# Patient Record
Sex: FEMALE | Race: WHITE | Employment: FULL TIME | URBAN - METROPOLITAN AREA
[De-identification: names, ages, dates, MRNs, and addresses within clinical notes are randomized per-mention and may not be internally consistent; named-entity substitution may affect disease eponyms.]

---

## 2017-04-07 ENCOUNTER — OFFICE VISIT (OUTPATIENT)
Dept: INTERNAL MEDICINE CLINIC | Facility: CLINIC | Age: 42
End: 2017-04-07

## 2017-04-07 ENCOUNTER — LAB ENCOUNTER (OUTPATIENT)
Dept: LAB | Age: 42
End: 2017-04-07
Attending: INTERNAL MEDICINE
Payer: COMMERCIAL

## 2017-04-07 ENCOUNTER — TELEPHONE (OUTPATIENT)
Dept: INTERNAL MEDICINE CLINIC | Facility: CLINIC | Age: 42
End: 2017-04-07

## 2017-04-07 VITALS
SYSTOLIC BLOOD PRESSURE: 100 MMHG | TEMPERATURE: 98 F | HEIGHT: 62 IN | DIASTOLIC BLOOD PRESSURE: 70 MMHG | BODY MASS INDEX: 38.64 KG/M2 | WEIGHT: 210 LBS | RESPIRATION RATE: 12 BRPM | HEART RATE: 76 BPM

## 2017-04-07 DIAGNOSIS — G40.909 SEIZURE DISORDER (HCC): Chronic | ICD-10-CM

## 2017-04-07 DIAGNOSIS — N92.6 MENSTRUAL IRREGULARITY: Primary | ICD-10-CM

## 2017-04-07 DIAGNOSIS — E04.9 ENLARGED THYROID: ICD-10-CM

## 2017-04-07 DIAGNOSIS — I45.81 LONG QT SYNDROME: Chronic | ICD-10-CM

## 2017-04-07 DIAGNOSIS — N80.9 ENDOMETRIOSIS: Primary | ICD-10-CM

## 2017-04-07 DIAGNOSIS — N80.9 ENDOMETRIOSIS: ICD-10-CM

## 2017-04-07 DIAGNOSIS — J45.20 MILD INTERMITTENT ASTHMA WITHOUT COMPLICATION: ICD-10-CM

## 2017-04-07 DIAGNOSIS — R10.2 PELVIC PAIN: ICD-10-CM

## 2017-04-07 DIAGNOSIS — N92.6 MENSTRUAL IRREGULARITY: ICD-10-CM

## 2017-04-07 PROCEDURE — 84702 CHORIONIC GONADOTROPIN TEST: CPT

## 2017-04-07 PROCEDURE — 80185 ASSAY OF PHENYTOIN TOTAL: CPT

## 2017-04-07 PROCEDURE — 84443 ASSAY THYROID STIM HORMONE: CPT

## 2017-04-07 PROCEDURE — 99204 OFFICE O/P NEW MOD 45 MIN: CPT | Performed by: INTERNAL MEDICINE

## 2017-04-07 PROCEDURE — 80053 COMPREHEN METABOLIC PANEL: CPT

## 2017-04-07 PROCEDURE — 85025 COMPLETE CBC W/AUTO DIFF WBC: CPT

## 2017-04-07 RX ORDER — MONTELUKAST SODIUM 10 MG/1
10 TABLET ORAL
Qty: 30 TABLET | Refills: 3 | Status: SHIPPED | OUTPATIENT
Start: 2017-04-07 | End: 2017-08-04

## 2017-04-07 RX ORDER — FLUTICASONE PROPIONATE AND SALMETEROL 500; 50 UG/1; UG/1
1 POWDER RESPIRATORY (INHALATION) 2 TIMES DAILY
Qty: 1 EACH | Refills: 3 | Status: SHIPPED | OUTPATIENT
Start: 2017-04-07 | End: 2018-01-22

## 2017-04-07 NOTE — TELEPHONE ENCOUNTER
Pt had labs drawn today. Please add beta hcg to the blood draw. Order just placed. Please let lab know.

## 2017-04-07 NOTE — TELEPHONE ENCOUNTER
Spoke to [place] at [facility] lab. Confirmed order for Hcg Urine was received. Test will be completed.

## 2017-04-07 NOTE — PROGRESS NOTES
[facility]    CHIEF COMPLAINT:  Patient presents with:  Bleeding: vaginal bleeding since yesterday morning, dark red. Today bleeding continues with abdominal pain.          HISTORY OF PRESENT ILLNESS:  The patient is a 35year old year old female symptoms   • Hearing impaired    • Osteoarthritis    • Asthma         Past Surgical History:       Past Surgical History    PACEMAKER      ORAL SURGERY PROCEDURE      Comment wisdom teeth    REMOVAL OF OVARIAN CYST(S)  2002    TUBAL LIGATION  2002    OTHER [DISCONTINUED] Montelukast Sodium 10 MG Oral Tab Take 1 tablet (10 mg total) by mouth once daily.  Disp: 300 tablet Rfl: 0   [DISCONTINUED] Phenytoin Sodium Extended (DILANTIN) 100 MG Oral Cap 1 tab po tid Disp: 90 capsule Rfl: 0   Cholecalciferol (VITAMI Wes Ross Father      polio wiht  back issues resulting   • Other[Other] [OTHER] Mother      hemochromatosis/ cva liver disorder   • Other[Other] [OTHER] Paternal Grandmother      ephzema       REVIEW OF SYSTEMS:  GENERAL HEALTH: feels well othe times three,cranial nerves are intact,motor and sensory are grossly intact    Labs:     Lab Results  Component Value Date/Time   WBC 6.5 02/02/2016 07:21 PM   HGB 13.2 02/02/2016 07:21 PM   .0 02/02/2016 07:21 PM        Lab Results  Component Value Future    6. Enlarged thyroid  - TSH [E]; Future  - US THYROID (SNZ=[id number]);  Future    RTC 6 months for physical.      Radha Biggs

## 2017-06-12 ENCOUNTER — OFFICE VISIT (OUTPATIENT)
Dept: INTERNAL MEDICINE CLINIC | Facility: CLINIC | Age: 42
End: 2017-06-12

## 2017-06-12 VITALS
BODY MASS INDEX: 38 KG/M2 | WEIGHT: 207 LBS | HEART RATE: 84 BPM | SYSTOLIC BLOOD PRESSURE: 96 MMHG | DIASTOLIC BLOOD PRESSURE: 64 MMHG | TEMPERATURE: 99 F | RESPIRATION RATE: 12 BRPM

## 2017-06-12 DIAGNOSIS — K59.00 CONSTIPATION, UNSPECIFIED CONSTIPATION TYPE: Primary | ICD-10-CM

## 2017-06-12 DIAGNOSIS — R10.9 ABDOMINAL PAIN, UNSPECIFIED LOCATION: ICD-10-CM

## 2017-06-12 PROCEDURE — 99213 OFFICE O/P EST LOW 20 MIN: CPT | Performed by: PHYSICIAN ASSISTANT

## 2017-06-12 NOTE — PROGRESS NOTES
Tina Valdez is a 35year old female  Patient presents with:  Constipation: Translater: Arabella  Anal Problem (GI): pain      HPI:   Pt has had constipation since Saturday, here today with   - has had a lot of abdominal pain and Injection Device Inject 0.3 mg as directed once.  Disp: 1 Device Rfl: 0      Patient Active Problem List:     Long QT syndrome     Seizure disorder (HCC)     Asthma     S/P tubal ligation     Seasonal allergic rhinitis, unspecified allergic rhinitis trigger placed in this encounter. Meds & Refills for this Visit:  No prescriptions requested or ordered in this encounter    Imaging & Consults:  None    No Follow-up on file. There are no Patient Instructions on file for this visit.        The patient ind

## 2017-06-22 ENCOUNTER — PRIOR ORIGINAL RECORDS (OUTPATIENT)
Dept: OTHER | Age: 42
End: 2017-06-22

## 2017-07-31 ENCOUNTER — PRIOR ORIGINAL RECORDS (OUTPATIENT)
Dept: OTHER | Age: 42
End: 2017-07-31

## 2017-08-04 ENCOUNTER — PRIOR ORIGINAL RECORDS (OUTPATIENT)
Dept: OTHER | Age: 42
End: 2017-08-04

## 2017-08-04 DIAGNOSIS — J45.20 MILD INTERMITTENT ASTHMA WITHOUT COMPLICATION: ICD-10-CM

## 2017-08-04 RX ORDER — METOPROLOL SUCCINATE 50 MG/1
50 TABLET, EXTENDED RELEASE ORAL DAILY
Qty: 90 TABLET | Refills: 0 | Status: SHIPPED | OUTPATIENT
Start: 2017-08-04 | End: 2017-12-05

## 2017-08-04 RX ORDER — PHENYTOIN SODIUM 100 MG/1
CAPSULE, EXTENDED RELEASE ORAL
Qty: 120 CAPSULE | Refills: 2 | Status: CANCELLED | OUTPATIENT
Start: 2017-08-04

## 2017-08-04 RX ORDER — MONTELUKAST SODIUM 10 MG/1
10 TABLET ORAL
Qty: 30 TABLET | Refills: 2 | Status: SHIPPED | OUTPATIENT
Start: 2017-08-04 | End: 2017-12-12

## 2017-08-04 RX ORDER — PHENYTOIN SODIUM 100 MG/1
CAPSULE, EXTENDED RELEASE ORAL
Qty: 120 CAPSULE | Refills: 0 | Status: CANCELLED | OUTPATIENT
Start: 2017-08-04

## 2017-08-04 RX ORDER — METOPROLOL SUCCINATE 200 MG/1
200 TABLET, EXTENDED RELEASE ORAL DAILY
Qty: 90 TABLET | Refills: 0 | Status: SHIPPED | OUTPATIENT
Start: 2017-08-04 | End: 2017-12-12

## 2017-08-04 NOTE — TELEPHONE ENCOUNTER
From: Megan Guzman  Sent: 8/4/2017 10:22 AM CDT  Subject: Medication Renewal Request    Daquan Che.  Wendy Mcgraw would like a refill of the following medications:  Montelukast Sodium 10 MG Oral Tab Damaso Aden MD]    Preferred pharmacy: [address] [place] [address]

## 2017-08-04 NOTE — TELEPHONE ENCOUNTER
Last OV pertinent to medication: 4/7/17  Last refill date: 3/29/17     #/refills: 120/0  When pt was asked to return for OV: 6 months  Upcoming appt/reason: none, due 10/2017 latrell stevens

## 2017-08-04 NOTE — TELEPHONE ENCOUNTER
It looks like pt is now pt of Dr. Nery Caceres. Also it looks like she has been suspended from [facility] for bad debt.

## 2017-08-06 NOTE — TELEPHONE ENCOUNTER
Routed to new pcp office to contact pt regarding Dr. Oumou James note below  Dr. Renuka Deras is no longer pcp and is not cardiologist

## 2017-08-07 ENCOUNTER — PRIOR ORIGINAL RECORDS (OUTPATIENT)
Dept: OTHER | Age: 42
End: 2017-08-07

## 2017-08-14 ENCOUNTER — PRIOR ORIGINAL RECORDS (OUTPATIENT)
Dept: OTHER | Age: 42
End: 2017-08-14

## 2017-09-01 ENCOUNTER — PRIOR ORIGINAL RECORDS (OUTPATIENT)
Dept: OTHER | Age: 42
End: 2017-09-01

## 2017-09-05 ENCOUNTER — TELEPHONE (OUTPATIENT)
Dept: INTERNAL MEDICINE CLINIC | Facility: CLINIC | Age: 42
End: 2017-09-05

## 2017-09-20 ENCOUNTER — PRIOR ORIGINAL RECORDS (OUTPATIENT)
Dept: OTHER | Age: 42
End: 2017-09-20

## 2017-09-20 ENCOUNTER — MYAURORA ACCOUNT LINK (OUTPATIENT)
Dept: OTHER | Age: 42
End: 2017-09-20

## 2017-09-21 ENCOUNTER — PRIOR ORIGINAL RECORDS (OUTPATIENT)
Dept: OTHER | Age: 42
End: 2017-09-21

## 2017-10-02 ENCOUNTER — PATIENT MESSAGE (OUTPATIENT)
Dept: INTERNAL MEDICINE CLINIC | Facility: CLINIC | Age: 42
End: 2017-10-02

## 2017-10-02 RX ORDER — ALBUTEROL SULFATE 90 UG/1
AEROSOL, METERED RESPIRATORY (INHALATION)
Qty: 1 INHALER | Refills: 0 | Status: SHIPPED | OUTPATIENT
Start: 2017-10-02 | End: 2018-03-15

## 2017-10-02 NOTE — TELEPHONE ENCOUNTER
Previously prescribed by someone else. Last OV pertinent to medication: 4/7/17  Last refill date: 3/9/12? #/refills: 1+6  When pt was asked to return for OV: 6 months for physical (due now)  Upcoming appt/reason: none, pt notified needs appt.

## 2017-10-02 NOTE — TELEPHONE ENCOUNTER
From: Gabbie Cantor  To: Asif Hernandez MD  Sent: 10/2/2017 8:30 AM CDT  Subject: Prescription Question    HI Dr. Michelle Werner,     I requested Albuterol to be refilled and it was denied initially because it was under someone else.  Can you please place an order

## 2017-10-10 ENCOUNTER — TELEPHONE (OUTPATIENT)
Dept: NEUROLOGY | Facility: CLINIC | Age: 42
End: 2017-10-10

## 2017-11-27 ENCOUNTER — OFFICE VISIT (OUTPATIENT)
Dept: INTERNAL MEDICINE CLINIC | Facility: CLINIC | Age: 42
End: 2017-11-27

## 2017-11-27 ENCOUNTER — HOSPITAL (OUTPATIENT)
Dept: OTHER | Age: 42
End: 2017-11-27
Attending: EMERGENCY MEDICINE

## 2017-11-27 VITALS
HEART RATE: 80 BPM | HEIGHT: 62 IN | TEMPERATURE: 98 F | WEIGHT: 204.13 LBS | RESPIRATION RATE: 12 BRPM | DIASTOLIC BLOOD PRESSURE: 70 MMHG | BODY MASS INDEX: 37.56 KG/M2 | SYSTOLIC BLOOD PRESSURE: 100 MMHG

## 2017-11-27 DIAGNOSIS — R56.9 SEIZURE (HCC): ICD-10-CM

## 2017-11-27 DIAGNOSIS — Z12.31 SCREENING MAMMOGRAM, ENCOUNTER FOR: ICD-10-CM

## 2017-11-27 DIAGNOSIS — R60.0 LEG EDEMA: ICD-10-CM

## 2017-11-27 DIAGNOSIS — N92.6 MENSTRUAL IRREGULARITY: ICD-10-CM

## 2017-11-27 LAB
ANALYZER ANC (IANC): NORMAL
ANION GAP SERPL CALC-SCNC: 12 MMOL/L (ref 10–20)
BASOPHILS # BLD: 0 THOUSAND/MCL (ref 0–0.3)
BASOPHILS NFR BLD: 0 %
BUN SERPL-MCNC: 7 MG/DL (ref 6–20)
BUN/CREAT SERPL: 11 (ref 7–25)
CALCIUM SERPL-MCNC: 8.2 MG/DL (ref 8.4–10.2)
CHLORIDE: 103 MMOL/L (ref 98–107)
CO2 SERPL-SCNC: 26 MMOL/L (ref 21–32)
CREAT SERPL-MCNC: 0.63 MG/DL (ref 0.51–0.95)
DIFFERENTIAL METHOD BLD: NORMAL
EOSINOPHIL # BLD: 0.4 THOUSAND/MCL (ref 0.1–0.5)
EOSINOPHIL NFR BLD: 5 %
ERYTHROCYTE [DISTWIDTH] IN BLOOD: 13.2 % (ref 11–15)
GLUCOSE SERPL-MCNC: 103 MG/DL (ref 65–99)
HEMATOCRIT: 40.2 % (ref 36–46.5)
HGB BLD-MCNC: 13.4 GM/DL (ref 12–15.5)
LYMPHOCYTES # BLD: 1.4 THOUSAND/MCL (ref 1–4.8)
LYMPHOCYTES NFR BLD: 19 %
MCH RBC QN AUTO: 31.6 PG (ref 26–34)
MCHC RBC AUTO-ENTMCNC: 33.3 GM/DL (ref 32–36.5)
MCV RBC AUTO: 94.8 FL (ref 78–100)
MONOCYTES # BLD: 0.9 THOUSAND/MCL (ref 0.3–0.9)
MONOCYTES NFR BLD: 12 %
NEUTROPHILS # BLD: 4.9 THOUSAND/MCL (ref 1.8–7.7)
NEUTROPHILS NFR BLD: 64 %
NEUTS SEG NFR BLD: NORMAL %
PERCENT NRBC: NORMAL
PHENYTOIN SERPL-MCNC: 5.9 MCG/ML (ref 10–20)
PLATELET # BLD: 205 THOUSAND/MCL (ref 140–450)
POTASSIUM SERPL-SCNC: 3.2 MMOL/L (ref 3.4–5.1)
RBC # BLD: 4.24 MILLION/MCL (ref 4–5.2)
SODIUM SERPL-SCNC: 138 MMOL/L (ref 135–145)
WBC # BLD: 7.7 THOUSAND/MCL (ref 4.2–11)

## 2017-11-27 PROCEDURE — 99214 OFFICE O/P EST MOD 30 MIN: CPT | Performed by: INTERNAL MEDICINE

## 2017-11-27 RX ORDER — PHENYTOIN SODIUM 100 MG/1
400 CAPSULE, EXTENDED RELEASE ORAL DAILY
COMMUNITY
End: 2018-07-19

## 2017-11-27 NOTE — PROGRESS NOTES
[facility]    CHIEF COMPLAINT:  Patient presents with:  ER F/U: pt was seen today at [facility] ER regarding a seizure  Medication Problem: pt not clear how she should be taking dilantin.           HISTORY OF PRESENT ILLNESS:  Pt here with multiple MG Oral Tablet 24 Hr Take 1 tablet (200 mg total) by mouth daily. Take with 50mg tablet for 250 mg dose daily Disp: 90 tablet Rfl: 0   Metoprolol Succinate ER 50 MG Oral Tablet 24 Hr Take 1 tablet (50 mg total) by mouth daily.  Take with 200mg tablet daily METABOLIC PANEL (14)   Result Value Ref Range   Glucose 86 70 - 99 mg/dL   BUN 12 8 - 20 mg/dL   Creatinine 0.61 0.55 - 1.02 mg/dL    >=60   Calcium, Total 8.6 8.3 - 10.3 mg/dL   Alkaline Phosphatase 91 37 - 98 U/L   AST 22 15 - 41 U/L   Alt 32 14 - perimenopausal.   Will check pelvic us and labs. Needs gyne follow up, given names today. - US PELVIS EV (TRNS ABD AND ENDOVAG) (VJO=[id number]); Future  - LH (LUTEINIZING HORMONE); Future  - FSH; Future    4.  Screening mammogram, encounter for  - INDERJIT

## 2017-11-29 ENCOUNTER — TELEPHONE (OUTPATIENT)
Dept: INTERNAL MEDICINE CLINIC | Facility: CLINIC | Age: 42
End: 2017-11-29

## 2017-11-29 NOTE — TELEPHONE ENCOUNTER
Okay to go back to work but cannot drive or operate heavy machinery until she is cleared by neurology. Also please check to make sure she does not do any heavy lifting or climbing on ladders at work. Please do letter once confirmed.

## 2017-11-29 NOTE — TELEPHONE ENCOUNTER
Patient called for a status on her back to work letter. She said did not get the latest [employer] message. 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 Please send a [employer] message when letter is ready.

## 2017-11-29 NOTE — TELEPHONE ENCOUNTER
Pt requesting return to work note, indicating that pt is ready to return to work 11/30/17. Pt requesting to  note later today. Letter pended. Please advise.

## 2017-11-29 NOTE — TELEPHONE ENCOUNTER
Pt needs a note that she can return to work. She would like to go back to work by tomorrow. Pt would like to  note today. Please call her when it is ready.  Thank you

## 2017-11-29 NOTE — TELEPHONE ENCOUNTER
Noted below. Sent [employer] message advising pt that letter ready for  at .

## 2017-11-29 NOTE — TELEPHONE ENCOUNTER
Left detailed message on pt's voice mail re: note for return to work will include restrictions about avoiding heavy lifting and climbing ladders at work. Pt to call back to confirm that her work does not involve this activity.     Letter edited to include

## 2017-11-30 ENCOUNTER — APPOINTMENT (OUTPATIENT)
Dept: LAB | Age: 42
End: 2017-11-30
Attending: INTERNAL MEDICINE
Payer: COMMERCIAL

## 2017-11-30 DIAGNOSIS — N92.6 MENSTRUAL IRREGULARITY: ICD-10-CM

## 2017-11-30 PROCEDURE — 36415 COLL VENOUS BLD VENIPUNCTURE: CPT

## 2017-11-30 PROCEDURE — 83001 ASSAY OF GONADOTROPIN (FSH): CPT

## 2017-11-30 PROCEDURE — 83002 ASSAY OF GONADOTROPIN (LH): CPT

## 2017-12-05 ENCOUNTER — OFFICE VISIT (OUTPATIENT)
Dept: NEUROLOGY | Facility: CLINIC | Age: 42
End: 2017-12-05

## 2017-12-05 VITALS
RESPIRATION RATE: 16 BRPM | HEIGHT: 62 IN | BODY MASS INDEX: 37.73 KG/M2 | DIASTOLIC BLOOD PRESSURE: 63 MMHG | SYSTOLIC BLOOD PRESSURE: 111 MMHG | HEART RATE: 82 BPM | WEIGHT: 205 LBS

## 2017-12-05 DIAGNOSIS — G40.909 SEIZURE DISORDER (HCC): Primary | Chronic | ICD-10-CM

## 2017-12-05 DIAGNOSIS — I45.81 LONG QT SYNDROME: Chronic | ICD-10-CM

## 2017-12-05 PROCEDURE — 99204 OFFICE O/P NEW MOD 45 MIN: CPT | Performed by: OTHER

## 2017-12-05 RX ORDER — METOPROLOL SUCCINATE 50 MG/1
TABLET, EXTENDED RELEASE ORAL
Qty: 90 TABLET | Refills: 0 | Status: SHIPPED | OUTPATIENT
Start: 2017-12-05 | End: 2017-12-12

## 2017-12-05 RX ORDER — PHENYTOIN 50 MG/1
50 TABLET, CHEWABLE ORAL DAILY
Qty: 30 TABLET | Refills: 5 | Status: SHIPPED | OUTPATIENT
Start: 2017-12-05 | End: 2018-02-26

## 2017-12-05 RX ORDER — PHENYTOIN SODIUM 100 MG/1
400 CAPSULE, EXTENDED RELEASE ORAL DAILY
Qty: 120 CAPSULE | Refills: 5 | Status: SHIPPED | OUTPATIENT
Start: 2017-12-05 | End: 2018-01-22

## 2017-12-05 NOTE — PROGRESS NOTES
[facility]   Neurology; INITIAL CLINIC VISIT  2017, 10:03 AM     Tory Peres Patient Status:  No patient class for patient encounter    1975 MRN [id number]   Location [facility] Attending No att.  prov HISTORY      Comment: knee left scope  not acl  09/15/09: OTHER SURGICAL HISTORY      Comment: colpo   No date: PACEMAKER  2002: REMOVAL OF OVARIAN CYST(S)  2002: TUBAL LIGATION    FAMILY HISTORY:  family history includes Allergies in her mother; Arthritis tablet, Rfl: 0  •  fluticasone-salmeterol (ADVAIR DISKUS) 500-50 MCG/DOSE Inhalation Aerosol Powder, Breath Activated, Inhale 1 puff into the lungs 2 (two) times daily. , Disp: 1 each, Rfl: 3  •  Ciclopirox Olamine (LOPROX) 0.77 % Apply Externally Cream, Ap again.    PLAN:  Diagnostic:    Repeat levels of Dilantin in 10 days. Referrals: None    Treatments: Continue Dilantin but at the higher dose of 450 mg daily.     Others: Advised accordingly and we will decide about driving in 10 days depending on her bl

## 2017-12-05 NOTE — PATIENT INSTRUCTIONS
Refill policies:    • Allow 2-3 business days for refills; controlled substances may take longer.   • Contact your pharmacy at least 5 days prior to running out of medication and have them send an electronic request or submit request through the [facility] have a procedure or additional testing performed. [facility]) will contact your insurance carrier to obtain pre-certification or prior authorization.     Unfortunately, [facility]I has seen an increase in denial of payment even though the p

## 2017-12-11 RX ORDER — METOPROLOL SUCCINATE 200 MG/1
200 TABLET, EXTENDED RELEASE ORAL DAILY
Qty: 90 TABLET | Refills: 0 | Status: CANCELLED
Start: 2017-12-11

## 2017-12-11 RX ORDER — METOPROLOL SUCCINATE 50 MG/1
TABLET, EXTENDED RELEASE ORAL
Qty: 90 TABLET | Refills: 0 | Status: CANCELLED
Start: 2017-12-11

## 2017-12-11 NOTE — TELEPHONE ENCOUNTER
Pt advised to contact us approx 1 week before actually needing the refill. Otherwise re-send request if she would like the pharmacy to put a script \"on hold\" but would have to advise them when she is ready for it that she has on \"on hold. \" for now, req

## 2017-12-11 NOTE — TELEPHONE ENCOUNTER
From: Alana De Souza  Sent: 12/11/2017 1:12 PM CST  Subject: Medication Renewal Request    Elenora Skiff.  Sharita Adams would like a refill of the following medications:     Metoprolol Succinate  MG Oral Tablet 24 Hr Nolan Boswell MD]   Patient Comment: I h

## 2017-12-12 DIAGNOSIS — J45.20 MILD INTERMITTENT ASTHMA WITHOUT COMPLICATION: ICD-10-CM

## 2017-12-12 RX ORDER — MONTELUKAST SODIUM 10 MG/1
10 TABLET ORAL
Qty: 30 TABLET | Refills: 1 | Status: SHIPPED
Start: 2017-12-12 | End: 2018-04-14

## 2017-12-12 RX ORDER — METOPROLOL SUCCINATE 200 MG/1
200 TABLET, EXTENDED RELEASE ORAL DAILY
Qty: 90 TABLET | Refills: 0 | Status: SHIPPED | OUTPATIENT
Start: 2017-12-12 | End: 2018-04-14

## 2017-12-12 RX ORDER — METOPROLOL SUCCINATE 50 MG/1
TABLET, EXTENDED RELEASE ORAL
Qty: 90 TABLET | Refills: 0 | Status: SHIPPED | OUTPATIENT
Start: 2017-12-12 | End: 2018-04-18

## 2017-12-12 RX ORDER — MONTELUKAST SODIUM 10 MG/1
10 TABLET ORAL
Qty: 30 TABLET | Refills: 2 | OUTPATIENT
Start: 2017-12-12

## 2017-12-12 NOTE — TELEPHONE ENCOUNTER
Was patient advised to contact pharmacy directly?:  Patient did not want to call it in.     Is patient out of meds or supply very low?:  Totally Out    Medication Requested:  Montelukast Sodium 10 MG Oral Tab    Is patient requesting a 30 or 90 day supply?:

## 2017-12-12 NOTE — TELEPHONE ENCOUNTER
Pt called with translater, did not read [employer] message. Wanted to know why rx denied. Explained again. Pt prefers rx be sent to pharmacy now and it end up on hold than risk it not getting filled on time. Will send refill request for pt.

## 2017-12-12 NOTE — TELEPHONE ENCOUNTER
Passes protocol but last note said to return for CPX in 2 weeks. Pt scheduled in January. Okay to fill? Metoprolol Succinate  MG Oral Tablet 24 Hr  Take 1 tablet (200 mg total) by mouth daily.  Take with 50mg tablet for 250 mg dose daily       Marivel Vazquez

## 2017-12-12 NOTE — TELEPHONE ENCOUNTER
From: Kathi Franklin  Sent: 12/12/2017 2:22 PM CST  Subject: Medication Renewal Request    Rob Wang.  Curtis Cherry would like a refill of the following medications:     Montelukast Sodium 10 MG Oral Tab Jade Sparks MD]   Patient Comment: Please request Angelina Haywood

## 2017-12-18 ENCOUNTER — TELEPHONE (OUTPATIENT)
Dept: NEUROLOGY | Facility: CLINIC | Age: 42
End: 2017-12-18

## 2017-12-18 ENCOUNTER — APPOINTMENT (OUTPATIENT)
Dept: LAB | Age: 42
End: 2017-12-18
Attending: INTERNAL MEDICINE
Payer: COMMERCIAL

## 2017-12-18 DIAGNOSIS — G40.909 SEIZURE DISORDER (HCC): Chronic | ICD-10-CM

## 2017-12-18 DIAGNOSIS — I45.81 LONG QT SYNDROME: Chronic | ICD-10-CM

## 2017-12-18 DIAGNOSIS — R56.9 SEIZURES (HCC): Primary | ICD-10-CM

## 2017-12-18 PROCEDURE — 80185 ASSAY OF PHENYTOIN TOTAL: CPT

## 2017-12-18 PROCEDURE — 36415 COLL VENOUS BLD VENIPUNCTURE: CPT

## 2017-12-18 NOTE — TELEPHONE ENCOUNTER
Per chart review:    Impression:  Seizure disorder ([facility] [address]  (primary encounter diagnosis)  Long QT syndrome     Discussion: I believe overall she is stable however missing 1 dose should not make the level extremely low.   In order to provide some safety facto

## 2017-12-18 NOTE — TELEPHONE ENCOUNTER
I called patient and left a message that the level is 21.5 so we do have some follow-up questions before we can clear her to go back driving. We need to know what time the blood was drawn for the test in relation to her intake of the Dilantin.       We may

## 2017-12-19 NOTE — TELEPHONE ENCOUNTER
Patient returned call. Instructions per Dr. Ochoa Paxinos given to patient through interpretor. My chart message also sent to patient. Order for repeat dilantin level placed in Epic.

## 2017-12-19 NOTE — TELEPHONE ENCOUNTER
Left message for patient regarding timing of Dilantin and blood draw. Requested patient my chart or call office with information.

## 2017-12-26 ENCOUNTER — HOSPITAL ENCOUNTER (OUTPATIENT)
Dept: MAMMOGRAPHY | Facility: HOSPITAL | Age: 42
Discharge: HOME | End: 2017-12-26
Attending: INTERNAL MEDICINE
Payer: COMMERCIAL

## 2017-12-26 ENCOUNTER — HOSPITAL ENCOUNTER (OUTPATIENT)
Dept: ULTRASOUND IMAGING | Facility: HOSPITAL | Age: 42
Discharge: HOME | End: 2017-12-26
Attending: INTERNAL MEDICINE
Payer: COMMERCIAL

## 2017-12-26 DIAGNOSIS — Z12.31 SCREENING MAMMOGRAM, ENCOUNTER FOR: ICD-10-CM

## 2017-12-26 DIAGNOSIS — N92.6 MENSTRUAL IRREGULARITY: ICD-10-CM

## 2017-12-26 PROCEDURE — 77067 SCR MAMMO BI INCL CAD: CPT | Performed by: INTERNAL MEDICINE

## 2017-12-26 PROCEDURE — 76830 TRANSVAGINAL US NON-OB: CPT | Performed by: INTERNAL MEDICINE

## 2017-12-26 PROCEDURE — 76856 US EXAM PELVIC COMPLETE: CPT | Performed by: INTERNAL MEDICINE

## 2018-01-03 ENCOUNTER — APPOINTMENT (OUTPATIENT)
Dept: LAB | Facility: HOSPITAL | Age: 43
End: 2018-01-03
Attending: Other
Payer: COMMERCIAL

## 2018-01-03 DIAGNOSIS — R56.9 SEIZURES (HCC): ICD-10-CM

## 2018-01-03 LAB — PHENYTOIN (DILANTIN): 11.8 UG/ML (ref 10–20)

## 2018-01-03 PROCEDURE — 36415 COLL VENOUS BLD VENIPUNCTURE: CPT

## 2018-01-03 PROCEDURE — 80185 ASSAY OF PHENYTOIN TOTAL: CPT

## 2018-01-11 ENCOUNTER — HOSPITAL ENCOUNTER (OUTPATIENT)
Dept: MAMMOGRAPHY | Facility: HOSPITAL | Age: 43
Discharge: HOME | End: 2018-01-11
Attending: INTERNAL MEDICINE
Payer: COMMERCIAL

## 2018-01-11 DIAGNOSIS — R92.2 INCONCLUSIVE MAMMOGRAM: ICD-10-CM

## 2018-01-22 ENCOUNTER — OFFICE VISIT (OUTPATIENT)
Dept: INTERNAL MEDICINE CLINIC | Facility: CLINIC | Age: 43
End: 2018-01-22

## 2018-01-22 VITALS
SYSTOLIC BLOOD PRESSURE: 100 MMHG | HEIGHT: 62 IN | RESPIRATION RATE: 12 BRPM | TEMPERATURE: 98 F | BODY MASS INDEX: 37.7 KG/M2 | WEIGHT: 204.88 LBS | DIASTOLIC BLOOD PRESSURE: 70 MMHG | HEART RATE: 76 BPM

## 2018-01-22 DIAGNOSIS — R32 URINARY INCONTINENCE, UNSPECIFIED TYPE: ICD-10-CM

## 2018-01-22 DIAGNOSIS — Z00.00 PHYSICAL EXAM, ANNUAL: ICD-10-CM

## 2018-01-22 DIAGNOSIS — J45.20 MILD INTERMITTENT ASTHMA WITHOUT COMPLICATION: ICD-10-CM

## 2018-01-22 DIAGNOSIS — G40.909 SEIZURE DISORDER (HCC): Chronic | ICD-10-CM

## 2018-01-22 DIAGNOSIS — Z23 NEED FOR VACCINATION: ICD-10-CM

## 2018-01-22 LAB
APPEARANCE: CLEAR
BILIRUBIN: NEGATIVE
GLUCOSE (URINE DIPSTICK): NEGATIVE MG/DL
KETONES (URINE DIPSTICK): NEGATIVE MG/DL
LEUKOCYTES: NEGATIVE
MULTISTIX LOT#: NORMAL NUMERIC
NITRITE, URINE: NEGATIVE
OCCULT BLOOD: NEGATIVE
PH, URINE: 6.5 (ref 4.5–8)
PROTEIN (URINE DIPSTICK): NEGATIVE MG/DL
SPECIFIC GRAVITY: 1.01 (ref 1–1.03)
URINE-COLOR: YELLOW
UROBILINOGEN,SEMI-QN: 0.2 MG/DL (ref 0–1.9)

## 2018-01-22 PROCEDURE — 90471 IMMUNIZATION ADMIN: CPT | Performed by: INTERNAL MEDICINE

## 2018-01-22 PROCEDURE — 99396 PREV VISIT EST AGE 40-64: CPT | Performed by: INTERNAL MEDICINE

## 2018-01-22 PROCEDURE — 81003 URINALYSIS AUTO W/O SCOPE: CPT | Performed by: INTERNAL MEDICINE

## 2018-01-22 PROCEDURE — 90732 PPSV23 VACC 2 YRS+ SUBQ/IM: CPT | Performed by: INTERNAL MEDICINE

## 2018-01-22 RX ORDER — FLUTICASONE PROPIONATE AND SALMETEROL 500; 50 UG/1; UG/1
1 POWDER RESPIRATORY (INHALATION) 2 TIMES DAILY
Qty: 1 EACH | Refills: 3 | Status: SHIPPED | OUTPATIENT
Start: 2018-01-22 | End: 2018-03-15

## 2018-01-22 RX ORDER — EPINEPHRINE 0.3 MG/.3ML
0.3 INJECTION SUBCUTANEOUS ONCE
Qty: 1 EACH | Refills: 0 | Status: SHIPPED | OUTPATIENT
Start: 2018-01-22 | End: 2018-10-09

## 2018-01-22 NOTE — PROGRESS NOTES
[facility]    CHIEF COMPLAINT: Patient presents with:  Routine Physical: 6/24/13 normal pap, neg HPV.  12/26/17 mammogram      Declines flu shot  Urinary: urinary incontience x 4-5 years. When at [facility].   Had noticed lump left lower abdomen, thou 250 mg dose daily Disp: 90 tablet Rfl: 0   Metoprolol Succinate ER 50 MG Oral Tablet 24 Hr TAKE 1 TABLET BY MOUTH DAILY. TAKE WITH 200 MG TABLET FOR TOTAL 250 MG DAILY.  Disp: 90 tablet Rfl: 0   Montelukast Sodium 10 MG Oral Tab Take 1 tablet (10 mg total) CYST(S)  2002: TUBAL LIGATION   Family History   Problem Relation Age of Onset   • Heart Disorder Father    • Other Pamila Mize Father      polio wiht  back issues resulting   • Hypertension Mother    • Asthma Mother    • Allergies Mother      med's  and seans distress  SKIN: no rashes,no suspicious lesions  HEENT: atraumatic, normocephalic,ears and throat are clear  EYES:PERRLA, conjunctiva are clear  NECK: supple,no adenopathy,no bruits  CHEST: no chest tenderness  LUNGS: clear to auscultation  CARDIO: nl s1 a negative. - UROLOGY - INTERNAL    3. Mild intermittent asthma without complication  Continue inhalers. - fluticasone-salmeterol (ADVAIR DISKUS) 500-50 MCG/DOSE Inhalation Aerosol Powder, Breath Activated;  Inhale 1 puff into the lungs 2 (two) times haley

## 2018-01-24 ENCOUNTER — TELEPHONE (OUTPATIENT)
Dept: INTERNAL MEDICINE CLINIC | Facility: CLINIC | Age: 43
End: 2018-01-24

## 2018-01-24 NOTE — TELEPHONE ENCOUNTER
Incoming (mail or fax): fax  Received from:  [facility]  Documentation given to:  triage;

## 2018-01-30 ENCOUNTER — PRIOR ORIGINAL RECORDS (OUTPATIENT)
Dept: OTHER | Age: 43
End: 2018-01-30

## 2018-01-31 ENCOUNTER — TELEPHONE (OUTPATIENT)
Dept: INTERNAL MEDICINE CLINIC | Facility: CLINIC | Age: 43
End: 2018-01-31

## 2018-02-21 ENCOUNTER — HOSPITAL ENCOUNTER (OUTPATIENT)
Age: 43
Discharge: HOME | End: 2018-02-21
Attending: FAMILY MEDICINE
Payer: COMMERCIAL

## 2018-02-21 VITALS
TEMPERATURE: 98 F | OXYGEN SATURATION: 97 % | SYSTOLIC BLOOD PRESSURE: 118 MMHG | DIASTOLIC BLOOD PRESSURE: 73 MMHG | RESPIRATION RATE: 16 BRPM | HEART RATE: 96 BPM

## 2018-02-21 DIAGNOSIS — H00.024 HORDEOLUM INTERNUM OF LEFT UPPER EYELID: Primary | ICD-10-CM

## 2018-02-21 PROCEDURE — 99213 OFFICE O/P EST LOW 20 MIN: CPT

## 2018-02-21 PROCEDURE — 99203 OFFICE O/P NEW LOW 30 MIN: CPT

## 2018-02-21 RX ORDER — TOBRAMYCIN 3 MG/ML
1 SOLUTION/ DROPS OPHTHALMIC EVERY 6 HOURS
Qty: 1 BOTTLE | Refills: 0 | Status: SHIPPED | OUTPATIENT
Start: 2018-02-21 | End: 2018-02-26

## 2018-02-21 NOTE — ED PROVIDER NOTES
Patient Seen in: [address]    History   Patient presents with:   Eye Visual Problem (opthalmic)    Stated Complaint: LEFT EYE BOTHERING HER SINCE YESTERDAY AFTERNOON, NO IMPROVEMENT AFTER COLD COM*    HPI    15-year-old fem equivalent: 0 - 1 per week     Comment: 1 drink every 1 or 2 weeks. Review of Systems    Positive for stated complaint: LEFT EYE BOTHERING HER SINCE YESTERDAY AFTERNOON, NO IMPROVEMENT AFTER COLD COM*  Other systems are as noted in HPI.   Constitutiona medications    Tobramycin Sulfate 0.3 % Ophthalmic Solution  Place 1 drop into the left eye every 6 (six) hours. , Normal, Disp-1 Bottle, R-0

## 2018-02-23 ENCOUNTER — TELEPHONE (OUTPATIENT)
Dept: INTERNAL MEDICINE CLINIC | Facility: CLINIC | Age: 43
End: 2018-02-23

## 2018-02-23 ENCOUNTER — TELEPHONE (OUTPATIENT)
Dept: NEUROLOGY | Facility: CLINIC | Age: 43
End: 2018-02-23

## 2018-02-23 DIAGNOSIS — G40.909 SEIZURE DISORDER (HCC): Primary | Chronic | ICD-10-CM

## 2018-02-23 NOTE — TELEPHONE ENCOUNTER
From: Gloria Lane  Sent: 2/23/2018 12:26 PM CST  To: Prieto Hsu Nurse  Subject: RE:Dilantin level    Thank you very much Nova Larios.      I did not feel comfortable sticking with 400 mg as there were times when it felt as if my heart was trying to

## 2018-02-23 NOTE — TELEPHONE ENCOUNTER
Patient was seen in [address] on Wednesday for a stye. She was given a medication that she thinks might be causing headaches. She wanted to schedule a follow up with Dr Birdia Collet for next week. Neither Dr Birdia Collet or Nancy Meyer have availability next week.   Please

## 2018-02-26 ENCOUNTER — HOSPITAL ENCOUNTER (OUTPATIENT)
Age: 43
Discharge: HOME | End: 2018-02-26
Attending: FAMILY MEDICINE
Payer: COMMERCIAL

## 2018-02-26 VITALS
SYSTOLIC BLOOD PRESSURE: 121 MMHG | DIASTOLIC BLOOD PRESSURE: 86 MMHG | OXYGEN SATURATION: 96 % | WEIGHT: 200 LBS | HEIGHT: 62 IN | BODY MASS INDEX: 36.8 KG/M2 | TEMPERATURE: 97 F | RESPIRATION RATE: 16 BRPM | HEART RATE: 83 BPM

## 2018-02-26 DIAGNOSIS — L03.213 PRESEPTAL CELLULITIS OF LEFT EYE: ICD-10-CM

## 2018-02-26 DIAGNOSIS — H10.502 BLEPHAROCONJUNCTIVITIS OF LEFT EYE, UNSPECIFIED BLEPHAROCONJUNCTIVITIS TYPE: Primary | ICD-10-CM

## 2018-02-26 PROCEDURE — 99213 OFFICE O/P EST LOW 20 MIN: CPT

## 2018-02-26 PROCEDURE — 99214 OFFICE O/P EST MOD 30 MIN: CPT

## 2018-02-26 RX ORDER — PHENYTOIN 50 MG/1
50 TABLET, CHEWABLE ORAL DAILY
Qty: 30 TABLET | Refills: 5 | COMMUNITY
Start: 2018-02-26 | End: 2018-08-15

## 2018-02-26 RX ORDER — OFLOXACIN 3 MG/ML
2 SOLUTION/ DROPS OPHTHALMIC EVERY 4 HOURS
Qty: 1 BOTTLE | Refills: 0 | Status: SHIPPED | OUTPATIENT
Start: 2018-02-26 | End: 2018-03-05

## 2018-02-26 RX ORDER — CLINDAMYCIN HYDROCHLORIDE 300 MG/1
300 CAPSULE ORAL 3 TIMES DAILY
Qty: 30 CAPSULE | Refills: 0 | Status: SHIPPED | OUTPATIENT
Start: 2018-02-26 | End: 2018-03-08

## 2018-02-26 NOTE — TELEPHONE ENCOUNTER
Message sent to patient with new dosage information. Instructed to contact office if new prescription required. Epic updated.

## 2018-02-26 NOTE — ED INITIAL ASSESSMENT (HPI)
Was seen and treated here on 2/21/18 for left eye pain and discharge. Today states she stopped taking her antibiotics on Saturday because she was getting headaches.   States her eye is not getting better and is swollen on the bottom and still having discha

## 2018-02-26 NOTE — TELEPHONE ENCOUNTER
Left message on pt's voice mail re: what symptoms pt is having. Pt asked to call back with condition update.

## 2018-02-27 NOTE — ED PROVIDER NOTES
Patient Seen in: [facility]    History   Patient presents with: Eye Visual Problem (opthalmic)    Stated Complaint: left eye irritation x 1 week    HPI    49-year-old female presents for left eye pain and drainage.   Ester 9/28/2008  Smokeless tobacco: Former User                     Alcohol use: Yes           0.0 - 0.6 oz/week     Standard drinks or equivalent: 0 - 1 per week     Comment: 1 drink every 1 or 2 weeks.       Review of Systems    Positive for stated complaint: l Advised to continue warm compresses to the eye. Start ofloxacin ophthalmic drops and clindamycin as prescribed. Follow with ophthalmologist in 2 days if not better.       Disposition and Plan     Clinical Impression:  Blepharoconjunctivitis of left

## 2018-03-01 NOTE — TELEPHONE ENCOUNTER
Left message on pt's mobile voice mail re: what symptoms pt is having. Pt asked to call back with condition update.

## 2018-03-02 NOTE — TELEPHONE ENCOUNTER
Pt called back via . States was at  twice, eye irritation and swelling. She did not call back when she inititally asked about eye drops possibly causing headache. She went to [facility] for this.  Tobramycin D/C'd and pt started on oxofloxacin which ha

## 2018-03-02 NOTE — TELEPHONE ENCOUNTER
Note sent via [email or web address] asking pt to call office or schedule online for appt next week for follow up.  Pt uses [employer], will check that pt did read msg (has been asked to use [employer] more frequently as it has been hard to reach her by phone in past), if she has n

## 2018-03-02 NOTE — TELEPHONE ENCOUNTER
Note not reviewed on mychart. PSR - please call pt (uses [employer]) to schedule Urgent Care follow up for eye. Dr Geri Terrazas does want her to follow up with her next week. Thanks!

## 2018-03-05 NOTE — TELEPHONE ENCOUNTER
Spoke with patient, advised of no show appointment and advised she will be getting a $25 no show fee. Patient rescheduled to come in Thursday 3/15/18 at 1:00. JOSH

## 2018-03-15 ENCOUNTER — TELEPHONE (OUTPATIENT)
Dept: INTERNAL MEDICINE CLINIC | Facility: CLINIC | Age: 43
End: 2018-03-15

## 2018-03-15 ENCOUNTER — OFFICE VISIT (OUTPATIENT)
Dept: INTERNAL MEDICINE CLINIC | Facility: CLINIC | Age: 43
End: 2018-03-15

## 2018-03-15 VITALS
RESPIRATION RATE: 12 BRPM | WEIGHT: 206.5 LBS | BODY MASS INDEX: 38 KG/M2 | TEMPERATURE: 98 F | HEIGHT: 62 IN | DIASTOLIC BLOOD PRESSURE: 72 MMHG | SYSTOLIC BLOOD PRESSURE: 120 MMHG | HEART RATE: 80 BPM

## 2018-03-15 DIAGNOSIS — J45.30 MILD PERSISTENT ASTHMA WITHOUT COMPLICATION: Primary | Chronic | ICD-10-CM

## 2018-03-15 DIAGNOSIS — G40.909 SEIZURE DISORDER (HCC): Chronic | ICD-10-CM

## 2018-03-15 DIAGNOSIS — L03.213 PRESEPTAL CELLULITIS OF LEFT EYE: ICD-10-CM

## 2018-03-15 PROCEDURE — 99213 OFFICE O/P EST LOW 20 MIN: CPT | Performed by: INTERNAL MEDICINE

## 2018-03-15 RX ORDER — BUDESONIDE AND FORMOTEROL FUMARATE DIHYDRATE 160; 4.5 UG/1; UG/1
1 AEROSOL RESPIRATORY (INHALATION) 2 TIMES DAILY
Qty: 3 INHALER | Refills: 0 | Status: SHIPPED | OUTPATIENT
Start: 2018-03-15 | End: 2018-10-09

## 2018-03-15 RX ORDER — ALBUTEROL SULFATE 90 UG/1
AEROSOL, METERED RESPIRATORY (INHALATION)
Qty: 1 INHALER | Refills: 0 | Status: SHIPPED | OUTPATIENT
Start: 2018-03-15 | End: 2018-05-17

## 2018-03-15 NOTE — PROGRESS NOTES
[facility]    CHIEF COMPLAINT:  Patient presents with:  Urgent Care F/u  Asthma: ACT- 17               REquesting a less expensive inhaler option to Advair. HISTORY OF PRESENT ILLNESS:  Here for follow up.    Was in urgent care last month one time.  Disp: 1 each Rfl: 0       PAST MEDICAL, SOCIAL, AND FAMILY HISTORY:  Tobacco:    Smoking status: Former Smoker 0.80 Packs/day For 2.00 Years   Types: Cigarettes   Quit date: 9/28/2008   Smokeless tobacco: Former User       PHYSICAL EXAM:  / Inhaler; Refill: 0    2. Preseptal cellulitis of left eye  Doing much better. Monitor for recurrence. 3. Seizure disorder ([facility] [address]  Continue dilantin per dr. Karri Davison. Return to clinic in 3 months.     Joan Garcia MD

## 2018-03-15 NOTE — TELEPHONE ENCOUNTER
Ventolin is not covered by insurance, pt can have proair, proair respiclick, and levoalbuterol as substitutes. Please advise.

## 2018-03-15 NOTE — TELEPHONE ENCOUNTER
Pt had palpitations with proair. Please check with pt to see if she has tried xopenex (levalbuterol) before. This may be a good option for her.

## 2018-03-16 RX ORDER — LEVALBUTEROL TARTRATE 45 UG/1
2 AEROSOL, METERED ORAL EVERY 6 HOURS PRN
Qty: 1 INHALER | Refills: 0 | Status: SHIPPED | OUTPATIENT
Start: 2018-03-16 | End: 2018-04-25

## 2018-03-16 NOTE — TELEPHONE ENCOUNTER
Called the patient, and spoke with her . The patient stated she has never tried levalbuterol before. Can she have Rx?

## 2018-03-30 ENCOUNTER — HOSPITAL ENCOUNTER (EMERGENCY)
Facility: HOSPITAL | Age: 43
Discharge: HOME | End: 2018-03-31
Attending: EMERGENCY MEDICINE
Payer: COMMERCIAL

## 2018-03-30 ENCOUNTER — APPOINTMENT (OUTPATIENT)
Dept: CT IMAGING | Facility: HOSPITAL | Age: 43
End: 2018-03-30
Attending: EMERGENCY MEDICINE
Payer: COMMERCIAL

## 2018-03-30 DIAGNOSIS — N83.202 CYSTS OF BOTH OVARIES: Primary | ICD-10-CM

## 2018-03-30 DIAGNOSIS — N83.201 CYSTS OF BOTH OVARIES: Primary | ICD-10-CM

## 2018-03-30 PROCEDURE — 80053 COMPREHEN METABOLIC PANEL: CPT | Performed by: EMERGENCY MEDICINE

## 2018-03-30 PROCEDURE — 81025 URINE PREGNANCY TEST: CPT

## 2018-03-30 PROCEDURE — 96375 TX/PRO/DX INJ NEW DRUG ADDON: CPT

## 2018-03-30 PROCEDURE — 99284 EMERGENCY DEPT VISIT MOD MDM: CPT

## 2018-03-30 PROCEDURE — 96361 HYDRATE IV INFUSION ADD-ON: CPT

## 2018-03-30 PROCEDURE — 96374 THER/PROPH/DIAG INJ IV PUSH: CPT

## 2018-03-30 PROCEDURE — 85025 COMPLETE CBC W/AUTO DIFF WBC: CPT | Performed by: EMERGENCY MEDICINE

## 2018-03-30 PROCEDURE — 74176 CT ABD & PELVIS W/O CONTRAST: CPT | Performed by: EMERGENCY MEDICINE

## 2018-03-30 PROCEDURE — 81001 URINALYSIS AUTO W/SCOPE: CPT | Performed by: EMERGENCY MEDICINE

## 2018-03-30 RX ORDER — MORPHINE SULFATE 4 MG/ML
4 INJECTION, SOLUTION INTRAMUSCULAR; INTRAVENOUS ONCE
Status: COMPLETED | OUTPATIENT
Start: 2018-03-30 | End: 2018-03-30

## 2018-03-30 RX ORDER — KETOROLAC TROMETHAMINE 30 MG/ML
30 INJECTION, SOLUTION INTRAMUSCULAR; INTRAVENOUS ONCE
Status: COMPLETED | OUTPATIENT
Start: 2018-03-30 | End: 2018-03-30

## 2018-03-31 ENCOUNTER — APPOINTMENT (OUTPATIENT)
Dept: ULTRASOUND IMAGING | Facility: HOSPITAL | Age: 43
End: 2018-03-31
Attending: EMERGENCY MEDICINE
Payer: COMMERCIAL

## 2018-03-31 VITALS
BODY MASS INDEX: 38 KG/M2 | OXYGEN SATURATION: 97 % | WEIGHT: 207.25 LBS | RESPIRATION RATE: 18 BRPM | DIASTOLIC BLOOD PRESSURE: 68 MMHG | TEMPERATURE: 98 F | HEART RATE: 80 BPM | SYSTOLIC BLOOD PRESSURE: 92 MMHG

## 2018-03-31 PROCEDURE — 76856 US EXAM PELVIC COMPLETE: CPT | Performed by: EMERGENCY MEDICINE

## 2018-03-31 PROCEDURE — 76830 TRANSVAGINAL US NON-OB: CPT | Performed by: EMERGENCY MEDICINE

## 2018-03-31 PROCEDURE — 93975 VASCULAR STUDY: CPT | Performed by: EMERGENCY MEDICINE

## 2018-03-31 RX ORDER — KETOROLAC TROMETHAMINE 10 MG/1
10 TABLET, FILM COATED ORAL EVERY 6 HOURS PRN
Qty: 15 TABLET | Refills: 0 | Status: SHIPPED | OUTPATIENT
Start: 2018-03-31 | End: 2018-04-07

## 2018-03-31 NOTE — ED PROVIDER NOTES
Patient Seen in: [facility] Emergency Department    History   Patient presents with:  Abdomen/Flank Pain (GI/)    Stated Complaint: abd pain     HPI    26-year-old female with a previous history of asthma, history of hearing impairment, patient co knee left scope  not acl  09/15/09: OTHER SURGICAL HISTORY      Comment: colpo   No date: PACEMAKER  2002: REMOVAL OF OVARIAN CYST(S)  2002: TUBAL LIGATION        Smoking status: Former Smoker                                                              Pa exam: Cranial nerves 2-12 are intact. There is no focal motor deficit noted. Skin exam: Warm to touch. Good skin turgor. No lacerations, abrasions, lesions noted.     ED Course     Labs Reviewed   COMP METABOLIC PANEL (14) - Abnormal; Notable for the fo Della Camarena MD on 3/30/2018 at 22:58       Approved by: Della Camarena MD         Findings of the CAT scan were discussed with the patient. Pelvic ultrasound was recommended and patient is agreeable to undergoing further imaging with ultrasound. Trudy Phalen discharge instructions. All questions were answered. MDM   77-year-old female presents with complaints of left lower pelvic pain.   She has evidence of 2 ovarian cyst.  Patient will be discharged home to follow-up with her gynecologist.  Patient will b

## 2018-04-02 ENCOUNTER — TELEPHONE (OUTPATIENT)
Dept: INTERNAL MEDICINE CLINIC | Facility: CLINIC | Age: 43
End: 2018-04-02

## 2018-04-02 NOTE — TELEPHONE ENCOUNTER
Pt at ED for ovarian cysts. Still with ovarian pain, needs appt soon. Do you want pt to schedule appt with you or go to gyne?

## 2018-04-02 NOTE — TELEPHONE ENCOUNTER
[facility] msg sent to pt, will follow up tomorrow during clinic hours if has not read msg.

## 2018-04-02 NOTE — TELEPHONE ENCOUNTER
Patient was in the [facility] ER last Friday for pain. She has 4 cysts on her ovaries - two on each side. The left side is worse. Patient would like to schedule a follow up visit.

## 2018-04-03 ENCOUNTER — OFFICE VISIT (OUTPATIENT)
Dept: OBGYN CLINIC | Facility: CLINIC | Age: 43
End: 2018-04-03

## 2018-04-03 VITALS
WEIGHT: 241 LBS | TEMPERATURE: 98 F | HEIGHT: 62 IN | RESPIRATION RATE: 12 BRPM | HEART RATE: 80 BPM | SYSTOLIC BLOOD PRESSURE: 108 MMHG | DIASTOLIC BLOOD PRESSURE: 74 MMHG | BODY MASS INDEX: 44.35 KG/M2

## 2018-04-03 DIAGNOSIS — N83.202 CYST OF LEFT OVARY: Primary | ICD-10-CM

## 2018-04-03 DIAGNOSIS — R32 URINARY INCONTINENCE, UNSPECIFIED TYPE: ICD-10-CM

## 2018-04-03 PROCEDURE — 99203 OFFICE O/P NEW LOW 30 MIN: CPT | Performed by: NURSE PRACTITIONER

## 2018-04-03 NOTE — PROGRESS NOTES
GYN H&P     4/3/2018  3:31 PM    CC: Patient is here to establish care    HPI: Patient is a 38year old  here to discuss follow up after ED visit on 3/30/18. Reports ED told her she has cysts in both her ovaries.  Right ovary has cyst measuring 2.5 x daily. Disp: 30 tablet Rfl: 5   Metoprolol Succinate  MG Oral Tablet 24 Hr Take 1 tablet (200 mg total) by mouth daily.  Take with 50mg tablet for 250 mg dose daily Disp: 90 tablet Rfl: 0   Metoprolol Succinate ER 50 MG Oral Tablet 24 Hr TAKE 1 TABLET Comment:Pt has a defib.   Levaquin                Palpitations  Naproxen                    Comment:Abdominal interlance  Percocet [Oxycodone*      Qvar [Beclomethason*      Strawberries              Tobramycin              Other (See Comments)    Comment:h peripheral edema or varicosities, skin warm and dry  ABDOMEN: Soft, non distended; non tender, no masses  GYNE/:   External Genitalia: normal, no lesions, good perineal support  Urethra: meatus normal   Bladder: well supported  Vagina: normal mucosa, no

## 2018-04-05 ENCOUNTER — HOSPITAL ENCOUNTER (EMERGENCY)
Facility: HOSPITAL | Age: 43
Discharge: HOME | End: 2018-04-06
Payer: COMMERCIAL

## 2018-04-05 ENCOUNTER — APPOINTMENT (OUTPATIENT)
Dept: CT IMAGING | Facility: HOSPITAL | Age: 43
End: 2018-04-05
Payer: COMMERCIAL

## 2018-04-05 ENCOUNTER — APPOINTMENT (OUTPATIENT)
Dept: OBGYN CLINIC | Facility: CLINIC | Age: 43
End: 2018-04-05

## 2018-04-05 DIAGNOSIS — N83.209 CYST OF OVARY, UNSPECIFIED LATERALITY: ICD-10-CM

## 2018-04-05 DIAGNOSIS — R10.9 FLANK PAIN: ICD-10-CM

## 2018-04-05 DIAGNOSIS — R10.9 ABDOMINAL PAIN, ACUTE: Primary | ICD-10-CM

## 2018-04-05 PROCEDURE — 83690 ASSAY OF LIPASE: CPT

## 2018-04-05 PROCEDURE — 81003 URINALYSIS AUTO W/O SCOPE: CPT

## 2018-04-05 PROCEDURE — 85025 COMPLETE CBC W/AUTO DIFF WBC: CPT

## 2018-04-05 PROCEDURE — 80053 COMPREHEN METABOLIC PANEL: CPT

## 2018-04-05 PROCEDURE — 99284 EMERGENCY DEPT VISIT MOD MDM: CPT

## 2018-04-05 PROCEDURE — 99285 EMERGENCY DEPT VISIT HI MDM: CPT

## 2018-04-05 PROCEDURE — 96361 HYDRATE IV INFUSION ADD-ON: CPT

## 2018-04-05 PROCEDURE — 96374 THER/PROPH/DIAG INJ IV PUSH: CPT

## 2018-04-05 PROCEDURE — 74176 CT ABD & PELVIS W/O CONTRAST: CPT

## 2018-04-05 PROCEDURE — 96375 TX/PRO/DX INJ NEW DRUG ADDON: CPT

## 2018-04-05 RX ORDER — KETOROLAC TROMETHAMINE 30 MG/ML
10 INJECTION, SOLUTION INTRAMUSCULAR; INTRAVENOUS ONCE
Status: COMPLETED | OUTPATIENT
Start: 2018-04-05 | End: 2018-04-05

## 2018-04-05 RX ORDER — MORPHINE SULFATE 4 MG/ML
4 INJECTION, SOLUTION INTRAMUSCULAR; INTRAVENOUS ONCE
Status: COMPLETED | OUTPATIENT
Start: 2018-04-05 | End: 2018-04-05

## 2018-04-05 RX ORDER — SODIUM CHLORIDE 9 MG/ML
1000 INJECTION, SOLUTION INTRAVENOUS ONCE
Status: COMPLETED | OUTPATIENT
Start: 2018-04-05 | End: 2018-04-05

## 2018-04-05 RX ORDER — SODIUM CHLORIDE 9 MG/ML
INJECTION, SOLUTION INTRAVENOUS CONTINUOUS
Status: DISCONTINUED | OUTPATIENT
Start: 2018-04-05 | End: 2018-04-06

## 2018-04-05 RX ORDER — TRAMADOL HYDROCHLORIDE 50 MG/1
TABLET ORAL EVERY 4 HOURS PRN
Qty: 20 TABLET | Refills: 0 | Status: SHIPPED | OUTPATIENT
Start: 2018-04-05 | End: 2018-04-12

## 2018-04-05 RX ORDER — METOCLOPRAMIDE HYDROCHLORIDE 5 MG/ML
5 INJECTION INTRAMUSCULAR; INTRAVENOUS ONCE
Status: COMPLETED | OUTPATIENT
Start: 2018-04-05 | End: 2018-04-05

## 2018-04-06 ENCOUNTER — TELEPHONE (OUTPATIENT)
Dept: OBGYN CLINIC | Facility: CLINIC | Age: 43
End: 2018-04-06

## 2018-04-06 VITALS
SYSTOLIC BLOOD PRESSURE: 112 MMHG | HEART RATE: 79 BPM | RESPIRATION RATE: 18 BRPM | OXYGEN SATURATION: 96 % | WEIGHT: 240.31 LBS | DIASTOLIC BLOOD PRESSURE: 62 MMHG | TEMPERATURE: 98 F | BODY MASS INDEX: 44 KG/M2

## 2018-04-06 NOTE — ED PROVIDER NOTES
Patient Seen in: [facility] Emergency Department    History   Patient presents with:  Abdomen/Flank Pain (GI/)    Stated Complaint: abd pain    HPI    Pleasant female with history of ovarian cyst here a few days after being seen in this emergency Packs/day: 0.80      Years: 2.00         Types: Cigarettes     Quit date: 9/28/2008  Smokeless tobacco: Former User                     Alcohol use: Yes           0.0 - 0.6 oz/week     Standard drinks or equivalent: 0 - 1 per week     Comment: 1 drink ever (*)     All other components within normal limits   LIPASE - Normal   CBC WITH DIFFERENTIAL WITH PLATELET    Narrative: The following orders were created for panel order CBC WITH DIFFERENTIAL WITH PLATELET.   Procedure                               Abno 8.41 cm x 4.28 cm x 3.87 cm        Endometrium Thickness:      0.80 cm Right Ovary:                         2.44 cm x 3.19 cm x 2.90 cm Left Ovary:                           3.81 cm x 4.63 cm x 3.78 cm  FINDINGS:  PELVIS  UTERUS:  Unremarkabl to the lack of intravenous contrast.  Examination mildly limited secondary to respiratory motion. LUNG BASE:  Partially visualized cardiac electrodes. Battery pack within the lower right abdominal wall subcutaneous fat. Scattered atelectasis.  LIVER:  Un were used. Dose information is transmitted to the ACR [employer] of Radiology) NRDR ([address]) which includes the Dose Index Registry.   PATIENT STATED HISTORY: (As transcribed by Technologist)  Patient with left lower Zeus Mean discharge, that an emergency medical condition was not or was no longer present. There was no indication for further evaluation, treatment or admission on an emergency basis.       The usual and customary discharge instuctions were discussed given the june

## 2018-04-06 NOTE — ED INITIAL ASSESSMENT (HPI)
Patient arrives from home with family c/o abdominal pain.  Had outpatient ultrasound (+) ovarian cyst per patient

## 2018-04-09 ENCOUNTER — OFFICE VISIT (OUTPATIENT)
Dept: OBGYN CLINIC | Facility: CLINIC | Age: 43
End: 2018-04-09

## 2018-04-09 ENCOUNTER — TELEPHONE (OUTPATIENT)
Dept: NEUROLOGY | Facility: CLINIC | Age: 43
End: 2018-04-09

## 2018-04-09 VITALS
WEIGHT: 241 LBS | SYSTOLIC BLOOD PRESSURE: 104 MMHG | HEART RATE: 68 BPM | DIASTOLIC BLOOD PRESSURE: 80 MMHG | BODY MASS INDEX: 44.35 KG/M2 | HEIGHT: 62 IN | RESPIRATION RATE: 14 BRPM | TEMPERATURE: 99 F

## 2018-04-09 DIAGNOSIS — N80.9 ENDOMETRIOSIS: ICD-10-CM

## 2018-04-09 DIAGNOSIS — N83.202 CYSTS OF BOTH OVARIES: ICD-10-CM

## 2018-04-09 DIAGNOSIS — N83.201 CYSTS OF BOTH OVARIES: ICD-10-CM

## 2018-04-09 DIAGNOSIS — R10.2 PELVIC PAIN: Primary | ICD-10-CM

## 2018-04-09 PROCEDURE — 99204 OFFICE O/P NEW MOD 45 MIN: CPT | Performed by: OBSTETRICS & GYNECOLOGY

## 2018-04-09 NOTE — PROGRESS NOTES
CHIEF COMPLAINT:   Patient presents with  Pelvic pain and recurrent bilateral ovarian cysts   HPI:   Barry Alfaro is a 38year old  Patient's last menstrual period was 2018 (exact date).  who presents with follow-up from the emergency r Toradol for pain and is getting relief    ROS:   GENERAL HEALTH: feels well, denies fever/chills, lightheadedness/dizziness  SKIN: denies any unusual skin lesions or rashes  HEENT: denies nasal congestion, sinus pain or sore throat; hearing loss negative

## 2018-04-09 NOTE — PROGRESS NOTES
Pt was here on Tuesday to see EDGARD Roach. She went to ER on Thursday for extreme pain. Pain scale was a 10 on Thursday. Pain today is about a 7. Pain meds were prescribed, Toradol and morphine. CT was performed.

## 2018-04-14 DIAGNOSIS — J45.20 MILD INTERMITTENT ASTHMA WITHOUT COMPLICATION: ICD-10-CM

## 2018-04-16 RX ORDER — METOPROLOL SUCCINATE 200 MG/1
TABLET, EXTENDED RELEASE ORAL
Qty: 90 TABLET | Refills: 0 | OUTPATIENT
Start: 2018-04-16

## 2018-04-16 RX ORDER — MONTELUKAST SODIUM 10 MG/1
TABLET ORAL
Qty: 30 TABLET | Refills: 0 | Status: SHIPPED | OUTPATIENT
Start: 2018-04-16 | End: 2018-05-15

## 2018-04-16 RX ORDER — METOPROLOL SUCCINATE 200 MG/1
TABLET, EXTENDED RELEASE ORAL
Qty: 90 TABLET | Refills: 0 | Status: SHIPPED | OUTPATIENT
Start: 2018-04-16 | End: 2018-04-23

## 2018-04-16 NOTE — TELEPHONE ENCOUNTER
Refill done for metoprolol succinate 200mg daily. She takes this with metoprolol succinate 50mg for a total of 250mg daily. Does she also need a refill for the 50mg strength? Refill done for singulair.

## 2018-04-19 RX ORDER — METOPROLOL SUCCINATE 50 MG/1
TABLET, EXTENDED RELEASE ORAL
Qty: 90 TABLET | Refills: 0 | Status: SHIPPED | OUTPATIENT
Start: 2018-04-19 | End: 2018-04-23

## 2018-04-23 ENCOUNTER — TELEPHONE (OUTPATIENT)
Dept: INTERNAL MEDICINE CLINIC | Facility: CLINIC | Age: 43
End: 2018-04-23

## 2018-04-23 NOTE — TELEPHONE ENCOUNTER
Please find out more about this. We just sent refills for 90 days for metoprolol for both 200mg and 50mg. Did she pick those up? Not sure that insurance will cover early refills. She may have to pay for them out of pocket.

## 2018-04-23 NOTE — TELEPHONE ENCOUNTER
Patient came in an d said she needs refill of metoprolol 200 mg and 50 mg.  jv lost the bottle of 200 and took a 50 instead so now she is out of both. Needs for tomorrow. Morris Castillo on Valangin

## 2018-04-24 RX ORDER — METOPROLOL SUCCINATE 200 MG/1
TABLET, EXTENDED RELEASE ORAL
Qty: 90 TABLET | Refills: 0 | Status: SHIPPED | OUTPATIENT
Start: 2018-04-24 | End: 2018-07-17

## 2018-04-24 RX ORDER — METOPROLOL SUCCINATE 50 MG/1
TABLET, EXTENDED RELEASE ORAL
Qty: 90 TABLET | Refills: 0 | Status: SHIPPED | OUTPATIENT
Start: 2018-04-24 | End: 2018-07-17

## 2018-04-24 NOTE — TELEPHONE ENCOUNTER
Pt lost the prescriptions. Will ask pharmacy to request refill too soon override due to lost Rx. Can we refill?

## 2018-04-25 ENCOUNTER — APPOINTMENT (OUTPATIENT)
Dept: GENERAL RADIOLOGY | Age: 43
End: 2018-04-25
Attending: FAMILY MEDICINE
Payer: COMMERCIAL

## 2018-04-25 ENCOUNTER — HOSPITAL ENCOUNTER (OUTPATIENT)
Age: 43
Discharge: HOME | End: 2018-04-25
Attending: FAMILY MEDICINE
Payer: COMMERCIAL

## 2018-04-25 VITALS
RESPIRATION RATE: 18 BRPM | SYSTOLIC BLOOD PRESSURE: 106 MMHG | BODY MASS INDEX: 36.8 KG/M2 | HEIGHT: 62 IN | OXYGEN SATURATION: 100 % | TEMPERATURE: 98 F | HEART RATE: 80 BPM | DIASTOLIC BLOOD PRESSURE: 69 MMHG | WEIGHT: 200 LBS

## 2018-04-25 DIAGNOSIS — J45.909 ASTHMA WITH BRONCHITIS: Primary | ICD-10-CM

## 2018-04-25 PROCEDURE — 99214 OFFICE O/P EST MOD 30 MIN: CPT

## 2018-04-25 PROCEDURE — 99213 OFFICE O/P EST LOW 20 MIN: CPT

## 2018-04-25 PROCEDURE — 71046 X-RAY EXAM CHEST 2 VIEWS: CPT | Performed by: FAMILY MEDICINE

## 2018-04-25 RX ORDER — BENZONATATE 100 MG/1
100 CAPSULE ORAL 3 TIMES DAILY PRN
Qty: 30 CAPSULE | Refills: 0 | Status: SHIPPED | OUTPATIENT
Start: 2018-04-25 | End: 2018-05-17 | Stop reason: ALTCHOICE

## 2018-04-25 RX ORDER — AZITHROMYCIN 250 MG/1
TABLET, FILM COATED ORAL
Qty: 1 PACKAGE | Refills: 0 | Status: SHIPPED | OUTPATIENT
Start: 2018-04-25 | End: 2018-05-17 | Stop reason: ALTCHOICE

## 2018-04-25 RX ORDER — LEVALBUTEROL TARTRATE 45 UG/1
2 AEROSOL, METERED ORAL EVERY 6 HOURS PRN
Qty: 1 INHALER | Refills: 0 | Status: SHIPPED | OUTPATIENT
Start: 2018-04-25 | End: 2018-10-09 | Stop reason: ALTCHOICE

## 2018-04-25 RX ORDER — PREDNISONE 20 MG/1
20 TABLET ORAL 2 TIMES DAILY
Qty: 14 TABLET | Refills: 0 | Status: SHIPPED | OUTPATIENT
Start: 2018-04-25 | End: 2018-05-02

## 2018-04-25 NOTE — ED NOTES
Patient is deaf and communication was completed with the use of the computer screen/monitor with the patient's history/record being present and with pen and paper. Patient was able to communicate without any problems with these things.

## 2018-04-25 NOTE — ED INITIAL ASSESSMENT (HPI)
The patient is here with complaints of a productive cough with green mucus, chills, a sore throat, head cold, and some wheezing x 1 week. Denies a fever or other symptoms. She has tried zarbee's and an OTC cold medicine without any relief.

## 2018-04-25 NOTE — ED PROVIDER NOTES
Patient Seen in: [facility]    History   Patient presents with:  Cough/URI  Sore Throat  Sinus Problem    Stated Complaint: head cold x1 week, headache    HPI    64-year-old female with a history of asthma presents with 1 w Types: Cigarettes     Quit date: 9/28/2008  Smokeless tobacco: Former User                     Alcohol use: Yes           0.0 - 0.6 oz/week     Standard drinks or equivalent: 0 - 1 per week     Comment: 1 drink every 1 or 2 weeks.       Review of Syst diagnosis)    Disposition:  Discharge  4/25/2018  6:19 pm    Follow-up:  Jacinto Bellamy, [address] [facility] [phone]    Call   As needed        Medications Prescribed:  Current Discharge Medication List    START t

## 2018-04-30 ENCOUNTER — HOSPITAL ENCOUNTER (OUTPATIENT)
Age: 43
Discharge: HOME | End: 2018-04-30
Attending: FAMILY MEDICINE
Payer: COMMERCIAL

## 2018-04-30 VITALS
HEART RATE: 80 BPM | SYSTOLIC BLOOD PRESSURE: 117 MMHG | OXYGEN SATURATION: 100 % | TEMPERATURE: 98 F | RESPIRATION RATE: 18 BRPM | DIASTOLIC BLOOD PRESSURE: 74 MMHG

## 2018-04-30 DIAGNOSIS — R42 VERTIGO: Primary | ICD-10-CM

## 2018-04-30 DIAGNOSIS — R11.2 NAUSEA AND VOMITING IN ADULT: ICD-10-CM

## 2018-04-30 PROCEDURE — 80047 BASIC METABLC PNL IONIZED CA: CPT

## 2018-04-30 PROCEDURE — 96374 THER/PROPH/DIAG INJ IV PUSH: CPT

## 2018-04-30 PROCEDURE — 81025 URINE PREGNANCY TEST: CPT | Performed by: FAMILY MEDICINE

## 2018-04-30 PROCEDURE — 85025 COMPLETE CBC W/AUTO DIFF WBC: CPT | Performed by: FAMILY MEDICINE

## 2018-04-30 PROCEDURE — 96361 HYDRATE IV INFUSION ADD-ON: CPT

## 2018-04-30 PROCEDURE — 99214 OFFICE O/P EST MOD 30 MIN: CPT

## 2018-04-30 PROCEDURE — 80076 HEPATIC FUNCTION PANEL: CPT | Performed by: FAMILY MEDICINE

## 2018-04-30 PROCEDURE — 81002 URINALYSIS NONAUTO W/O SCOPE: CPT | Performed by: FAMILY MEDICINE

## 2018-04-30 PROCEDURE — 99215 OFFICE O/P EST HI 40 MIN: CPT

## 2018-04-30 RX ORDER — ONDANSETRON 4 MG/1
4 TABLET, ORALLY DISINTEGRATING ORAL EVERY 4 HOURS PRN
Qty: 10 TABLET | Refills: 0 | Status: SHIPPED | OUTPATIENT
Start: 2018-04-30 | End: 2018-05-07

## 2018-04-30 RX ORDER — MECLIZINE HYDROCHLORIDE 25 MG/1
25 TABLET ORAL 3 TIMES DAILY PRN
Qty: 15 TABLET | Refills: 0 | Status: SHIPPED | OUTPATIENT
Start: 2018-04-30 | End: 2018-06-11

## 2018-04-30 RX ORDER — SODIUM CHLORIDE 9 MG/ML
1000 INJECTION, SOLUTION INTRAVENOUS ONCE
Status: COMPLETED | OUTPATIENT
Start: 2018-04-30 | End: 2018-04-30

## 2018-04-30 RX ORDER — ONDANSETRON 2 MG/ML
4 INJECTION INTRAMUSCULAR; INTRAVENOUS ONCE
Status: COMPLETED | OUTPATIENT
Start: 2018-04-30 | End: 2018-04-30

## 2018-04-30 RX ORDER — MECLIZINE HCL 12.5 MG/1
25 TABLET ORAL ONCE
Status: COMPLETED | OUTPATIENT
Start: 2018-04-30 | End: 2018-04-30

## 2018-04-30 NOTE — ED INITIAL ASSESSMENT (HPI)
Py has had dizziness and vomiting since Saturday that has not gone away. Pt is deaf and we are writing notes.   She has chills, and a fever, no abdominal pain, no visual disturbance, no urinary symptoms or diarrhea

## 2018-04-30 NOTE — ED PROVIDER NOTES
Patient Seen in: [facility]    History   Patient presents with:  Dizziness (neurologic)    Stated Complaint: vomiting x2 days    HPI    26-year-old female with a history of endometriosis, ovarian cyst, gastric ulcer, and ve defibrillator   1992/2001/2008: OTHER SURGICAL HISTORY      Comment: dual chamber pacemaker  No date: OTHER SURGICAL HISTORY      Comment: bunion  No date: OTHER SURGICAL HISTORY      Comment: knee left scope  not acl  09/15/09: OTHER SURGICAL HISTORY Notable for the following:        Result Value    Urine Clarity Slightly cloudy (*)     Blood, Urine Moderate (*)     All other components within normal limits   POCT ISTAT CHEM8 CARTRIDGE - Abnormal; Notable for the following:     ISTAT Chloride 96 (*) hours as needed for Nausea.   Qty: 10 tablet Refills: 0

## 2018-05-07 ENCOUNTER — TELEPHONE (OUTPATIENT)
Dept: INTERNAL MEDICINE CLINIC | Facility: CLINIC | Age: 43
End: 2018-05-07

## 2018-05-07 NOTE — TELEPHONE ENCOUNTER
Her liver enzymes show no worrisome findings. However if she is concerned about hemochromatosis she should discuss this with me at an office visit. She has an appt 6/2018, she can bring it up then.

## 2018-05-07 NOTE — TELEPHONE ENCOUNTER
Pt called through  with concerns about labs done recently at , LFT does not appear to have anything worrisome, however, pt concerned due to her mother having hemochromatosis and wanted reassurance that her labs were okay.  Advised would consult

## 2018-05-08 ENCOUNTER — LABORATORY ENCOUNTER (OUTPATIENT)
Dept: LAB | Age: 43
End: 2018-05-08
Attending: INTERNAL MEDICINE
Payer: COMMERCIAL

## 2018-05-08 DIAGNOSIS — Z00.00 PHYSICAL EXAM, ANNUAL: ICD-10-CM

## 2018-05-08 PROCEDURE — 36415 COLL VENOUS BLD VENIPUNCTURE: CPT | Performed by: INTERNAL MEDICINE

## 2018-05-08 PROCEDURE — 80061 LIPID PANEL: CPT | Performed by: INTERNAL MEDICINE

## 2018-05-08 PROCEDURE — 80050 GENERAL HEALTH PANEL: CPT | Performed by: INTERNAL MEDICINE

## 2018-05-15 DIAGNOSIS — J45.20 MILD INTERMITTENT ASTHMA WITHOUT COMPLICATION: ICD-10-CM

## 2018-05-16 ENCOUNTER — TELEPHONE (OUTPATIENT)
Dept: OBGYN CLINIC | Facility: CLINIC | Age: 43
End: 2018-05-16

## 2018-05-16 RX ORDER — MONTELUKAST SODIUM 10 MG/1
TABLET ORAL
Qty: 30 TABLET | Refills: 0 | Status: SHIPPED | OUTPATIENT
Start: 2018-05-16 | End: 2018-06-16

## 2018-05-16 NOTE — TELEPHONE ENCOUNTER
Last OV relevant to medication: 03/15/2018  Last refill date:4/16/2018 3  #/refills: 0  When pt was asked to return for OV: 3months  Upcoming appt/reason:       ACT completed 03/15/2018  AAP completed 05/16/2018 sent via [facility]

## 2018-05-17 ENCOUNTER — OFFICE VISIT (OUTPATIENT)
Dept: OBGYN CLINIC | Facility: CLINIC | Age: 43
End: 2018-05-17

## 2018-05-17 VITALS
HEART RATE: 72 BPM | SYSTOLIC BLOOD PRESSURE: 106 MMHG | WEIGHT: 205 LBS | DIASTOLIC BLOOD PRESSURE: 78 MMHG | RESPIRATION RATE: 16 BRPM | BODY MASS INDEX: 37.73 KG/M2 | HEIGHT: 62 IN

## 2018-05-17 DIAGNOSIS — N93.9 EPISODE OF HEAVY VAGINAL BLEEDING: ICD-10-CM

## 2018-05-17 DIAGNOSIS — R10.2 PELVIC PAIN: Primary | ICD-10-CM

## 2018-05-17 DIAGNOSIS — N83.202 CYSTS OF BOTH OVARIES: ICD-10-CM

## 2018-05-17 DIAGNOSIS — N83.201 CYSTS OF BOTH OVARIES: ICD-10-CM

## 2018-05-17 PROCEDURE — 99213 OFFICE O/P EST LOW 20 MIN: CPT | Performed by: NURSE PRACTITIONER

## 2018-05-17 NOTE — PROGRESS NOTES
Anil Vásquez is a 38year old female. HPI:   Patient presents with: Other: LLQ pain, heavy bleeding   ( present for visit)    Pt states the pain in her LLQ is back and it is \"bad\". Pt rates pain 6-7/10.  Pt has pelvic US tomorro tablet Rfl: 5   Phenytoin Sodium Extended 100 MG Oral Cap Take 400 mg by mouth daily. Disp:  Rfl:    MECLIZINE HCL 25 MG OR CHEW 1 TABLET 3 TIMES DAILY AS NEEDED Disp:  Rfl:    BENADRYL 25 MG OR CAPS 2 CAPSULES QD.   Will take extra 2 if experiencing irene resulting   • Hypertension Mother    • Asthma Mother    • Allergies Mother      med's  and seansonal   • Arthritis Mother    • Heart Disease Mother    • Other Rihcy Crass Mother      hemochromatosis/ cva liver disorder   • Heart Attack Paternal Grandfather

## 2018-05-17 NOTE — PROGRESS NOTES
LMP 5/15/2018. Has been having LLQ pain since menses started. Flow red to bark brown. Passing clots. Pain increases with clots. Last night flow was heavy. Has pelvic ultrasound scheduled 5/21/2018.

## 2018-05-18 ENCOUNTER — HOSPITAL ENCOUNTER (OUTPATIENT)
Dept: ULTRASOUND IMAGING | Facility: HOSPITAL | Age: 43
Discharge: HOME | End: 2018-05-18
Attending: NURSE PRACTITIONER
Payer: COMMERCIAL

## 2018-05-18 DIAGNOSIS — N83.202 CYSTS OF BOTH OVARIES: ICD-10-CM

## 2018-05-18 DIAGNOSIS — N83.201 CYSTS OF BOTH OVARIES: ICD-10-CM

## 2018-05-18 DIAGNOSIS — R10.2 PELVIC PAIN: ICD-10-CM

## 2018-05-18 PROCEDURE — 76856 US EXAM PELVIC COMPLETE: CPT | Performed by: NURSE PRACTITIONER

## 2018-05-18 PROCEDURE — 76830 TRANSVAGINAL US NON-OB: CPT | Performed by: NURSE PRACTITIONER

## 2018-05-22 ENCOUNTER — TELEPHONE (OUTPATIENT)
Dept: OBGYN CLINIC | Facility: CLINIC | Age: 43
End: 2018-05-22

## 2018-05-22 ENCOUNTER — OFFICE VISIT (OUTPATIENT)
Dept: OBGYN CLINIC | Facility: CLINIC | Age: 43
End: 2018-05-22

## 2018-05-22 VITALS
BODY MASS INDEX: 37.54 KG/M2 | DIASTOLIC BLOOD PRESSURE: 78 MMHG | SYSTOLIC BLOOD PRESSURE: 112 MMHG | TEMPERATURE: 98 F | HEIGHT: 62 IN | WEIGHT: 204 LBS

## 2018-05-22 DIAGNOSIS — N80.9 ENDOMETRIOSIS: Primary | ICD-10-CM

## 2018-05-22 DIAGNOSIS — N83.202 CYSTS OF BOTH OVARIES: ICD-10-CM

## 2018-05-22 DIAGNOSIS — N83.201 CYSTS OF BOTH OVARIES: ICD-10-CM

## 2018-05-22 DIAGNOSIS — R10.2 PELVIC PAIN: ICD-10-CM

## 2018-05-22 PROCEDURE — 99213 OFFICE O/P EST LOW 20 MIN: CPT | Performed by: NURSE PRACTITIONER

## 2018-05-22 RX ORDER — TRAMADOL HYDROCHLORIDE 50 MG/1
100 TABLET ORAL EVERY 6 HOURS PRN
Qty: 20 TABLET | Refills: 0 | Status: SHIPPED | OUTPATIENT
Start: 2018-05-22 | End: 2018-05-31

## 2018-05-22 NOTE — TELEPHONE ENCOUNTER
----- Message from FELICITAS San sent at 5/22/2018 12:44 PM CDT -----  Regarding: Lupron  Ruth Moon,    Please do a prior auth for Alessia for Lupron 3.75mg monthly injection for 6 months. Indication- endometriosis    Thanks.

## 2018-05-22 NOTE — PROGRESS NOTES
Collins Anderson is a 38year old female. HPI:   Patient presents with: Other: follow up after ultrasound   ( present)    Patient and her  present to discuss US results and options for treatment.  US showed some resolution of p Extended 100 MG Oral Cap Take 400 mg by mouth daily. Disp:  Rfl:    MECLIZINE HCL 25 MG OR CHEW 1 TABLET 3 TIMES DAILY AS NEEDED Disp:  Rfl:    BENADRYL 25 MG OR CAPS 2 CAPSULES QD. Will take extra 2 if experiencing allergy sx.  Disp:  Rfl:    EPINEPHrin • Asthma Mother    • Allergies Mother      med's  and seansonal   • Arthritis Mother    • Heart Disease Mother    • Other Sheryl Ship Mother      hemochromatosis/ cva liver disorder   • Heart Attack Paternal Grandfather    • Cancer Maternal Grandmother of ordering Lupron for injection. Script for Tramadol 50mg sent to pt's pharmacy.  Instructed to take prior to bed PRN to help with pain and allow for better rest at night.      5/22/2018  FELICITAS Torrez

## 2018-05-31 ENCOUNTER — TELEPHONE (OUTPATIENT)
Dept: OBGYN CLINIC | Facility: CLINIC | Age: 43
End: 2018-05-31

## 2018-05-31 RX ORDER — HYDROCODONE BITARTRATE AND ACETAMINOPHEN 7.5; 325 MG/1; MG/1
1 TABLET ORAL EVERY 6 HOURS PRN
Qty: 12 TABLET | Refills: 0 | Status: SHIPPED | OUTPATIENT
Start: 2018-05-31 | End: 2018-07-11 | Stop reason: ALTCHOICE

## 2018-05-31 RX ORDER — TRAMADOL HYDROCHLORIDE 50 MG/1
100 TABLET ORAL EVERY 6 HOURS PRN
Qty: 24 TABLET | Refills: 0 | Status: SHIPPED | OUTPATIENT
Start: 2018-05-31 | End: 2018-06-15

## 2018-05-31 NOTE — TELEPHONE ENCOUNTER
Patient is still in severe pain and is out of tramadol. She is checking on status of Lupron pre-authorization and also to see what she can do about the pain. Pain is still in pelvis and by her ovaries (site of her cysts).     Discussed with patient I will d

## 2018-06-01 NOTE — TELEPHONE ENCOUNTER
Prior authorization for Lupron resubmitted per phone with reference number [id number].  Clinical info faxed to [facility] at fax number [phone]

## 2018-06-01 NOTE — TELEPHONE ENCOUNTER
----- Message from Fredy Alvarez sent at 6/1/2018 10:50 AM CDT -----  Returning yaneth call re:approval

## 2018-06-01 NOTE — TELEPHONE ENCOUNTER
Spoke with pt via telephone . Pt feeling like she has PCS (pelvic congestion syndrome) due to the pressure and pain in her pelvis and into her right leg. Also because she suffers from a fair amount of urinary urgency (no incontinence).    Rev

## 2018-06-04 NOTE — TELEPHONE ENCOUNTER
Spoke to Natalie today. Lupron approved for 1 month. Will be applying for prior auth for next injection. Cha Reilly will contact pt to set up appt for injection this week.

## 2018-06-04 NOTE — TELEPHONE ENCOUNTER
Prior authorization for patient approved. Reference number [id number].   Patient voice mail not set up

## 2018-06-06 NOTE — TELEPHONE ENCOUNTER
LVM w/ patient to call back. Iggy has arrived in our office, insurance has approved for her to receive it 6/7/18 ONLY.  Please call back and schedule

## 2018-06-07 ENCOUNTER — NURSE ONLY (OUTPATIENT)
Dept: OBGYN CLINIC | Facility: CLINIC | Age: 43
End: 2018-06-07

## 2018-06-07 DIAGNOSIS — N80.9 ENDOMETRIOSIS: ICD-10-CM

## 2018-06-07 DIAGNOSIS — N80.1 ENDOMETRIOSIS OF OVARY: ICD-10-CM

## 2018-06-07 PROCEDURE — 96372 THER/PROPH/DIAG INJ SC/IM: CPT | Performed by: NURSE PRACTITIONER

## 2018-06-07 NOTE — PROGRESS NOTES
Here for first Lupron Depot injection. States has not been sexually active. Lmp  5/15/208. Given left upper gluteal. Tolerated well.

## 2018-06-11 ENCOUNTER — OFFICE VISIT (OUTPATIENT)
Dept: NEUROLOGY | Facility: CLINIC | Age: 43
End: 2018-06-11

## 2018-06-11 VITALS
HEIGHT: 62 IN | HEART RATE: 86 BPM | SYSTOLIC BLOOD PRESSURE: 113 MMHG | DIASTOLIC BLOOD PRESSURE: 70 MMHG | BODY MASS INDEX: 37.54 KG/M2 | RESPIRATION RATE: 16 BRPM | WEIGHT: 204 LBS

## 2018-06-11 DIAGNOSIS — G40.909 SEIZURE DISORDER (HCC): Primary | Chronic | ICD-10-CM

## 2018-06-11 DIAGNOSIS — I45.81 LONG QT SYNDROME: Chronic | ICD-10-CM

## 2018-06-11 PROCEDURE — 99213 OFFICE O/P EST LOW 20 MIN: CPT | Performed by: OTHER

## 2018-06-11 RX ORDER — MECLIZINE HYDROCHLORIDE 25 MG/1
25 TABLET ORAL 3 TIMES DAILY PRN
COMMUNITY
End: 2018-10-09

## 2018-06-11 NOTE — PATIENT INSTRUCTIONS
Refill policies:    • Allow 2-3 business days for refills; controlled substances may take longer.   • Contact your pharmacy at least 5 days prior to running out of medication and have them send an electronic request or submit request through the “request re entire amount billed. Precertification and Prior Authorizations: If your physician has recommended that you have a procedure or additional testing performed.   [facility]) will contact your insurance carrier to obtain pre-certi

## 2018-06-11 NOTE — PROGRESS NOTES
[facility] with [address]  11/20/1975  Primary Care Provider:  Shari Castle MD    6/11/2018  Accompanied visit:  ( ) No (x) yes,  (sign language)    38year old phenytoin 50 MG Oral Chew Tab, Chew 50 mg by mouth daily. , Disp: 30 tablet, Rfl: 5  •  EPINEPHrine (EPIPEN 2-EYAD) 0.3 MG/0.3ML Injection Solution Auto-injector, Inject 0.3 mL (1 each total) as directed one time. , Disp: 1 each, Rfl: 0  •  Phenytoin Sodium surveillance    Diagnostics:  none    Treatment:  Same     (x) Discussed potential side effects of any treatment relevant to above.     Follow up, in approximately:  ( ) 6-8 weeks    ( ) 3-4 months     ( ) 6-8 months   (x) yearly   ( ) As needed but knows t

## 2018-06-14 ENCOUNTER — TELEPHONE (OUTPATIENT)
Dept: OBGYN CLINIC | Facility: CLINIC | Age: 43
End: 2018-06-14

## 2018-06-14 NOTE — TELEPHONE ENCOUNTER
PC with Luz Elena Bullock at [employer] regarding prior authorization for Lupron for the next 5 months. States it is approved. Received a fax also stating approval start date 6/7/2018 to end date 12/2/2018 for the total of 6 visits. Reference number is [id number].

## 2018-06-15 RX ORDER — TRAMADOL HYDROCHLORIDE 50 MG/1
100 TABLET ORAL EVERY 8 HOURS PRN
Qty: 24 TABLET | Refills: 0 | Status: SHIPPED | OUTPATIENT
Start: 2018-06-15 | End: 2018-07-06

## 2018-06-15 NOTE — PROGRESS NOTES
Pt called office to discuss she did have pain relief after she first got her Lupron injection, but then this morning she woke up with a \"sharp throbbing, pain\" that she cannot relieve despite trying to \"kneel, sit, or lay\".  Pt tried Motrin but it did n

## 2018-06-16 ENCOUNTER — APPOINTMENT (OUTPATIENT)
Dept: GENERAL RADIOLOGY | Facility: HOSPITAL | Age: 43
End: 2018-06-16
Attending: EMERGENCY MEDICINE
Payer: COMMERCIAL

## 2018-06-16 ENCOUNTER — HOSPITAL ENCOUNTER (EMERGENCY)
Facility: HOSPITAL | Age: 43
Discharge: HOME | End: 2018-06-16
Attending: EMERGENCY MEDICINE
Payer: COMMERCIAL

## 2018-06-16 VITALS
OXYGEN SATURATION: 99 % | WEIGHT: 203.94 LBS | BODY MASS INDEX: 37 KG/M2 | HEART RATE: 80 BPM | DIASTOLIC BLOOD PRESSURE: 73 MMHG | TEMPERATURE: 99 F | SYSTOLIC BLOOD PRESSURE: 106 MMHG | RESPIRATION RATE: 18 BRPM

## 2018-06-16 DIAGNOSIS — J45.20 MILD INTERMITTENT ASTHMA WITHOUT COMPLICATION: ICD-10-CM

## 2018-06-16 DIAGNOSIS — S20.221A CONTUSION OF RIGHT SIDE OF BACK, INITIAL ENCOUNTER: Primary | ICD-10-CM

## 2018-06-16 DIAGNOSIS — S70.01XA CONTUSION OF RIGHT HIP, INITIAL ENCOUNTER: ICD-10-CM

## 2018-06-16 PROCEDURE — 99284 EMERGENCY DEPT VISIT MOD MDM: CPT

## 2018-06-16 PROCEDURE — 96372 THER/PROPH/DIAG INJ SC/IM: CPT

## 2018-06-16 PROCEDURE — 72100 X-RAY EXAM L-S SPINE 2/3 VWS: CPT | Performed by: EMERGENCY MEDICINE

## 2018-06-16 PROCEDURE — 73502 X-RAY EXAM HIP UNI 2-3 VIEWS: CPT | Performed by: EMERGENCY MEDICINE

## 2018-06-16 RX ORDER — KETOROLAC TROMETHAMINE 30 MG/ML
60 INJECTION, SOLUTION INTRAMUSCULAR; INTRAVENOUS ONCE
Status: COMPLETED | OUTPATIENT
Start: 2018-06-16 | End: 2018-06-16

## 2018-06-16 RX ORDER — CYCLOBENZAPRINE HCL 10 MG
10 TABLET ORAL 3 TIMES DAILY PRN
Qty: 20 TABLET | Refills: 0 | Status: SHIPPED | OUTPATIENT
Start: 2018-06-16 | End: 2018-06-23

## 2018-06-16 NOTE — ED INITIAL ASSESSMENT (HPI)
Pt presents to ER with right hip pain, status post slip and fall in the kitchen. No LOC. Pt c/o of pain from right hip down leg and lower back pain. Pt is responding appropriately with nurse. Respirations equal and nonlabored. Pt is a&ox3.  Pt is in a wheel

## 2018-06-16 NOTE — ED NOTES
Pt states she can walk she just has pain.  She is currently taking pain medication for her endometriosis

## 2018-06-16 NOTE — ED PROVIDER NOTES
Patient Seen in: [facility] Emergency Department    History   Patient presents with:  Lower Extremity Injury (musculoskeletal)    Stated Complaint: right hip pain    HPI    Patient is a 40-year-old female who states around 2 PM she slipped on water Packs/day: 0.80      Years: 2.00         Types: Cigarettes     Quit date: 9/28/2008  Smokeless tobacco: Former User                     Alcohol use: Yes           0.0 - 0.6 oz/week     Standard drinks or equivalent: 0 - 1 per week x-rays were negative. Patient will comfortable going home. Recommend ibuprofen first, she states she can take this without problem. Recommend ice for 2-3 days. Activity as tolerated. Flexeril as needed. May use her own tramadol if needed.

## 2018-06-18 ENCOUNTER — OFFICE VISIT (OUTPATIENT)
Dept: INTERNAL MEDICINE CLINIC | Facility: CLINIC | Age: 43
End: 2018-06-18

## 2018-06-18 VITALS
HEIGHT: 62 IN | HEART RATE: 80 BPM | RESPIRATION RATE: 12 BRPM | BODY MASS INDEX: 37.84 KG/M2 | TEMPERATURE: 98 F | SYSTOLIC BLOOD PRESSURE: 120 MMHG | DIASTOLIC BLOOD PRESSURE: 60 MMHG | WEIGHT: 205.63 LBS

## 2018-06-18 DIAGNOSIS — J45.30 MILD PERSISTENT ASTHMA WITHOUT COMPLICATION: Chronic | ICD-10-CM
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
## 2018-06-18 DIAGNOSIS — N83.202 CYST OF LEFT OVARY: ICD-10-CM

## 2018-06-18 DIAGNOSIS — I45.81 LONG QT SYNDROME: Chronic | ICD-10-CM

## 2018-06-18 DIAGNOSIS — G40.909 SEIZURE DISORDER (HCC): Chronic | ICD-10-CM

## 2018-06-18 PROCEDURE — 99214 OFFICE O/P EST MOD 30 MIN: CPT | Performed by: INTERNAL MEDICINE

## 2018-06-18 RX ORDER — MONTELUKAST SODIUM 10 MG/1
TABLET ORAL
Qty: 30 TABLET | Refills: 0 | Status: SHIPPED | OUTPATIENT
Start: 2018-06-18 | End: 2018-07-12

## 2018-06-18 NOTE — PROGRESS NOTES
[facility]    CHIEF COMPLAINT:  Patient presents with: Follow - Up  Fall: fell last week. Experiencing low back and right hip pain. Medication Request: refill singulair. HISTORY OF PRESENT ILLNESS:  Here for follow up.    She fell 2 days 0   Budesonide-Formoterol Fumarate (SYMBICORT) 160-4.5 MCG/ACT Inhalation Aerosol Inhale 1 puff into the lungs 2 (two) times daily. Disp: 3 Inhaler Rfl: 0   phenytoin 50 MG Oral Chew Tab Chew 50 mg by mouth daily.    Disp: 30 tablet Rfl: 5   EPINEPHrine (EP 42 14 - 54 U/L   Bilirubin, Total 0.3 0.1 - 2.0 mg/dL   Total Protein 7.4 6.1 - 8.3 g/dL   Albumin 3.4 (L) 3.5 - 4.8 g/dL   Sodium 139 136 - 144 mmol/L   Potassium 4.8 3.6 - 5.1 mmol/L   Chloride 103 101 - 111 mmol/L   CO2 27.0 22.0 - 32.0 mmol/L   -LIPID

## 2018-06-18 NOTE — TELEPHONE ENCOUNTER
Last OV relevant to medication: 3/15/18  Last refill date: 5/16/18     #/refills: 30/0  When pt was asked to return for OV: 3 mo  Upcoming appt/reason: 6/18/18 f/u

## 2018-06-20 ENCOUNTER — TELEPHONE (OUTPATIENT)
Dept: OBGYN CLINIC | Facility: CLINIC | Age: 43
End: 2018-06-20

## 2018-06-20 NOTE — TELEPHONE ENCOUNTER
Received letter from [facility]. Reference # [id number]. Approval for 6 total Lupron injections. . Start date 6/7/2018 to end date12/02/2018.

## 2018-06-27 ENCOUNTER — APPOINTMENT (OUTPATIENT)
Dept: OBGYN CLINIC | Facility: CLINIC | Age: 43
End: 2018-06-27

## 2018-06-27 ENCOUNTER — OFFICE VISIT (OUTPATIENT)
Dept: OBGYN CLINIC | Facility: CLINIC | Age: 43
End: 2018-06-27

## 2018-06-27 VITALS
BODY MASS INDEX: 37.36 KG/M2 | HEART RATE: 80 BPM | SYSTOLIC BLOOD PRESSURE: 112 MMHG | DIASTOLIC BLOOD PRESSURE: 64 MMHG | WEIGHT: 203 LBS | HEIGHT: 62 IN | TEMPERATURE: 98 F

## 2018-06-27 DIAGNOSIS — R10.2 PELVIC PAIN: Primary | ICD-10-CM

## 2018-06-27 DIAGNOSIS — N83.202 CYSTS OF BOTH OVARIES: ICD-10-CM

## 2018-06-27 DIAGNOSIS — N80.9 ENDOMETRIOSIS: ICD-10-CM

## 2018-06-27 DIAGNOSIS — N83.201 CYSTS OF BOTH OVARIES: ICD-10-CM

## 2018-06-27 PROCEDURE — 99213 OFFICE O/P EST LOW 20 MIN: CPT | Performed by: NURSE PRACTITIONER

## 2018-06-27 PROCEDURE — 81002 URINALYSIS NONAUTO W/O SCOPE: CPT | Performed by: NURSE PRACTITIONER

## 2018-06-27 NOTE — PROGRESS NOTES
Eran Swanson is a 38year old female. HPI:   Patient presents with: Other: Pelvic pain  Reports some relief from pain after her first Lupron inj on 6/7/18. States \"took about 1 week, but then pain got better\".  Reports increased pain that star Oral Cap Take 400 mg by mouth daily. Disp:  Rfl:    BENADRYL 25 MG OR CAPS 2 CAPSULES QD. Will take extra 2 if experiencing allergy sx.  Disp:  Rfl:    hydrocodone-acetaminophen 7.5-325 MG Oral Tab Take 1 tablet by mouth every 6 (six) hours as needed for issues resulting   • menieres [OTHER] Father    • Hypertension Mother    • Asthma Mother    • Allergies Mother      med's  and seansonal   • Arthritis Mother    • Heart Disease Mother    • Other Wilhemena Fuel Mother      hemochromatosis/ cva liver disorder   • H PRN for pain. 1. Pelvic pain  Pt requesting pelvic US today. Informed of option for expectant management and to re-evaluate after next Lupron injection, however, pt is requesting US for her pain.   - URINALYSIS NONAUTO W/O SCOPE  - US TRANSVAG/ABDOMINAL

## 2018-07-06 ENCOUNTER — OFFICE VISIT (OUTPATIENT)
Dept: OBGYN CLINIC | Facility: CLINIC | Age: 43
End: 2018-07-06

## 2018-07-06 VITALS
SYSTOLIC BLOOD PRESSURE: 110 MMHG | HEART RATE: 84 BPM | HEIGHT: 62 IN | RESPIRATION RATE: 16 BRPM | BODY MASS INDEX: 37.17 KG/M2 | DIASTOLIC BLOOD PRESSURE: 74 MMHG | WEIGHT: 202 LBS

## 2018-07-06 DIAGNOSIS — R10.2 PELVIC PAIN: ICD-10-CM

## 2018-07-06 DIAGNOSIS — N83.202 CYST OF LEFT OVARY: ICD-10-CM

## 2018-07-06 DIAGNOSIS — N80.9 ENDOMETRIOSIS: Primary | ICD-10-CM

## 2018-07-06 PROCEDURE — 99213 OFFICE O/P EST LOW 20 MIN: CPT | Performed by: NURSE PRACTITIONER

## 2018-07-06 PROCEDURE — 96372 THER/PROPH/DIAG INJ SC/IM: CPT | Performed by: NURSE PRACTITIONER

## 2018-07-06 RX ORDER — TRAMADOL HYDROCHLORIDE 50 MG/1
50 TABLET ORAL EVERY 6 HOURS PRN
Qty: 30 TABLET | Refills: 0 | Status: SHIPPED | OUTPATIENT
Start: 2018-07-06 | End: 2018-07-19

## 2018-07-06 NOTE — PROGRESS NOTES
Here for Lupron and discuss a cyst on the ovary. Refill on Tramadol. Lupron given in right upper gluteal. Tolerated.

## 2018-07-06 NOTE — PROGRESS NOTES
Jaxon Ryder is a 38year old female. HPI:   Patient presents with: Other: lupron injection and consult    Pt presents to discuss results of her last US. Pt was found to have a simple cyst of 2.7cm in the left ovary and a complex mass as well. phenytoin 50 MG Oral Chew Tab Chew 50 mg by mouth daily. Disp: 30 tablet Rfl: 5   Phenytoin Sodium Extended 100 MG Oral Cap Take 400 mg by mouth daily. Disp:  Rfl:    BENADRYL 25 MG OR CAPS 2 CAPSULES QD.   Will take extra 2 if experiencing allergy sx Comment: colpo   No date: PACEMAKER  2002: REMOVAL OF OVARIAN CYST(S)  2002: TUBAL LIGATION  Family History   Problem Relation Age of Onset   • Heart Disorder Father    • Other [OTHER] Father      polio wiht  back issues resulting   • menieres [OTHER] Fath for ovarian cyst in remaining right ovary. Benefit of intact right ovary being patient will not go into \"menopause\" state. Pt reports she \"very much likes this idea\" and would like to meet with Dr. Jaylan Marcelo to discuss moving forward.   Pt advised to make an

## 2018-07-10 ENCOUNTER — TELEPHONE (OUTPATIENT)
Dept: OBGYN CLINIC | Facility: CLINIC | Age: 43
End: 2018-07-10

## 2018-07-10 NOTE — TELEPHONE ENCOUNTER
Patient calling from work with assistance from [employer] . States her pain was \"bad\" this morning upon waking, but she was able to go to work. Then in the past 1-2 hours, the pain has gotten much worse.  Describes as Trish Mock, hot pain\" that is in lo

## 2018-07-11 ENCOUNTER — TELEPHONE (OUTPATIENT)
Dept: OBGYN CLINIC | Facility: CLINIC | Age: 43
End: 2018-07-11

## 2018-07-11 ENCOUNTER — OFFICE VISIT (OUTPATIENT)
Dept: OBGYN CLINIC | Facility: CLINIC | Age: 43
End: 2018-07-11

## 2018-07-11 VITALS
DIASTOLIC BLOOD PRESSURE: 70 MMHG | RESPIRATION RATE: 16 BRPM | WEIGHT: 203 LBS | HEART RATE: 76 BPM | SYSTOLIC BLOOD PRESSURE: 102 MMHG | HEIGHT: 62 IN | BODY MASS INDEX: 37.36 KG/M2

## 2018-07-11 DIAGNOSIS — N80.9 ENDOMETRIOSIS: ICD-10-CM

## 2018-07-11 DIAGNOSIS — R10.2 PELVIC PAIN: Primary | ICD-10-CM

## 2018-07-11 PROCEDURE — 99214 OFFICE O/P EST MOD 30 MIN: CPT | Performed by: OBSTETRICS & GYNECOLOGY

## 2018-07-11 NOTE — PROGRESS NOTES
LLQ pain, constant. Yesterday severe. Today moderate to severe. Plans on coming back Friday with spouse to discuss surgery. Went to urgent care to be seen but they did not have an ultrasound machine so she left.

## 2018-07-11 NOTE — PROGRESS NOTES
CHIEF COMPLAINT:   Patient presents with  Pelvic pain   HPI:   Marcel Lovett is a 38year old  Patient's last menstrual period was 2018 (exact date). who presents with left lower quadrant pain which she had for some time now.   She had p regular  PAP SMEAR HX:  history of abn pap, , last pap current    PHYSICAL EXAM:   /70 (BP Location: Right arm, Patient Position: Sitting, Cuff Size: large)   Pulse 76   Resp 16   Ht 62\"   Wt 203 lb   LMP 06/21/2018 (Exact Date)   BMI 37.13 kg/m²

## 2018-07-11 NOTE — TELEPHONE ENCOUNTER
Alessia called back this morning at 9am. States her pain is \"improved\" with the help of Tramadol. She did not go to urgent care of ED last night as \"pain was not that bad\".  Pt would still like to be seen today though and is inquiring about need for a fol

## 2018-07-12 DIAGNOSIS — J45.20 MILD INTERMITTENT ASTHMA WITHOUT COMPLICATION: ICD-10-CM

## 2018-07-13 ENCOUNTER — TELEPHONE (OUTPATIENT)
Dept: INTERNAL MEDICINE CLINIC | Facility: CLINIC | Age: 43
End: 2018-07-13

## 2018-07-13 ENCOUNTER — OFFICE VISIT (OUTPATIENT)
Dept: OBGYN CLINIC | Facility: CLINIC | Age: 43
End: 2018-07-13

## 2018-07-13 VITALS
BODY MASS INDEX: 36.8 KG/M2 | DIASTOLIC BLOOD PRESSURE: 72 MMHG | WEIGHT: 200 LBS | RESPIRATION RATE: 12 BRPM | HEART RATE: 84 BPM | HEIGHT: 62 IN | SYSTOLIC BLOOD PRESSURE: 120 MMHG

## 2018-07-13 DIAGNOSIS — R10.2 PELVIC PAIN: Primary | ICD-10-CM

## 2018-07-13 DIAGNOSIS — K59.00 CONSTIPATION, UNSPECIFIED CONSTIPATION TYPE: ICD-10-CM

## 2018-07-13 PROCEDURE — 99214 OFFICE O/P EST MOD 30 MIN: CPT | Performed by: OBSTETRICS & GYNECOLOGY

## 2018-07-13 RX ORDER — MONTELUKAST SODIUM 10 MG/1
TABLET ORAL
Qty: 30 TABLET | Refills: 0 | Status: SHIPPED | OUTPATIENT
Start: 2018-07-13 | End: 2018-08-23

## 2018-07-13 NOTE — TELEPHONE ENCOUNTER
Date Preop Scheduled:  7/30/18  Surgeon's Name:  Dr Daniel Pandya   Phone #:  unknown   Fax #:  unknown  Surgery Date:  8/9/18  Procedure/Diagnosis:  hysterectomy

## 2018-07-13 NOTE — PROGRESS NOTES
HPI:   Salina Moya is a 38year old female presents for consultation regarding getting hysterectomy done. She is here with her  today to discuss different options. They are both that and here with .    states that if th defibrillator   1992/2001/2008: OTHER SURGICAL HISTORY      Comment: dual chamber pacemaker  No date: OTHER SURGICAL HISTORY      Comment: bunion  No date: OTHER SURGICAL HISTORY      Comment: knee left scope  not acl  09/15/09: OTHER SURGICAL HISTORY morphine  Ephedrine                   Comment:Pt has a defib.   Levaquin                PALPITATIONS  Naproxen                    Comment:Abdominal interlance  Percocet [Oxycodone*      Qvar [Beclomethason*      Strawberries              Tobramycin counseling above

## 2018-07-16 NOTE — TELEPHONE ENCOUNTER
Noted below. 45 min already scheduled with Bassem for pre-op; an  has already been requested. [employer] message sent to pt regarding GI eval. Will monitor that message is read.

## 2018-07-16 NOTE — TELEPHONE ENCOUNTER
Okay for her to see gi for possible gi cause for her pelvic pain. She can see dr. Caroleen Merlin. Please provide her with contact information. She will need to schedule a pre op with brittany. Please give him extra time (45 mins) as she needs an .

## 2018-07-16 NOTE — TELEPHONE ENCOUNTER
Pre-op clearance form faxed to Dr Deya Christian office for completion. Dr Ledy Cano, do you want pt to consult with GI first? See notes below. Notes in Epic.       Surgeon Role   Elisha Guzman MD Primary    Procedure Laterality Anesthesia   TOTAL ABDOMINAL HYSTERECTO

## 2018-07-17 ENCOUNTER — PATIENT MESSAGE (OUTPATIENT)
Dept: OBGYN CLINIC | Facility: CLINIC | Age: 43
End: 2018-07-17

## 2018-07-17 NOTE — TELEPHONE ENCOUNTER
Pre-operative Clearance Request form received form Dr. Cody Cohen and placed in [place] [id number] folder. The patient has a pre-op appointment scheduled with Sarahi Swanson on 07/30/18.

## 2018-07-17 NOTE — TELEPHONE ENCOUNTER
From: Rosa Viera  Sent: 7/17/2018 2:29 PM CDT  Subject: Medication Renewal Request    [employer]. Conrad Cheadle would like a refill of the following medications:     Metoprolol Succinate ER 50 MG Oral Tablet 24 Hr Dhruvjamaal Cali MD]     Metoprolol Succin

## 2018-07-18 NOTE — TELEPHONE ENCOUNTER
From: Silvia Pope  To: Matheus Adan MD  Sent: 7/17/2018 12:57 PM CDT  Subject: Non-Urgent Cathy Abdullahi,     I wonder if it would be possible to put in an order for assistive devices to help me after the surgery - toilet trans

## 2018-07-19 DIAGNOSIS — G40.909 SEIZURE DISORDER (HCC): Primary | Chronic | ICD-10-CM

## 2018-07-19 RX ORDER — PHENYTOIN SODIUM 100 MG/1
400 CAPSULE, EXTENDED RELEASE ORAL DAILY
Qty: 120 CAPSULE | Refills: 11 | Status: SHIPPED | OUTPATIENT
Start: 2018-07-19 | End: 2018-08-28

## 2018-07-19 RX ORDER — TRAMADOL HYDROCHLORIDE 50 MG/1
50 TABLET ORAL EVERY 6 HOURS PRN
Qty: 40 TABLET | Refills: 0 | Status: SHIPPED | OUTPATIENT
Start: 2018-07-19 | End: 2018-11-06

## 2018-07-19 RX ORDER — METOPROLOL SUCCINATE 200 MG/1
TABLET, EXTENDED RELEASE ORAL
Qty: 90 TABLET | Refills: 0 | Status: SHIPPED
Start: 2018-07-19 | End: 2018-08-10 | Stop reason: ALTCHOICE

## 2018-07-19 RX ORDER — METOPROLOL SUCCINATE 50 MG/1
TABLET, EXTENDED RELEASE ORAL
Qty: 90 TABLET | Refills: 0 | Status: SHIPPED
Start: 2018-07-19 | End: 2018-08-10 | Stop reason: ALTCHOICE

## 2018-07-19 RX ORDER — TRAMADOL HYDROCHLORIDE 50 MG/1
50 TABLET ORAL EVERY 6 HOURS PRN
Qty: 40 TABLET | Refills: 0
Start: 2018-07-19 | End: 2018-07-19

## 2018-07-19 NOTE — TELEPHONE ENCOUNTER
From: Silvia Pope  Sent: 7/19/2018 11:29 AM CDT  Subject: Medication Renewal Request    Quinton Echeverria.  Natalia Valladares would like a refill of the following medications:     TraMADol HCl 50 MG Oral Tab FELICITAS Davis]   Patient Comment: I'm still having

## 2018-07-19 NOTE — TELEPHONE ENCOUNTER
Pt did not realize she was out of phenytoin and is going out of town. Apologizes for last minute request, but can it be done today? Please send a refill to [facility] in [address] [facility].

## 2018-07-19 NOTE — TELEPHONE ENCOUNTER
Medication: Generic Dilantin    Date of last refill: 2/23/2018  With dosage adjustment  [place]    Last office visit: 6/11/2018  Due back to clinic per last office note:  RTC yearly  Date next [facility]

## 2018-07-23 ENCOUNTER — TELEPHONE (OUTPATIENT)
Dept: OBGYN CLINIC | Facility: CLINIC | Age: 43
End: 2018-07-23

## 2018-07-23 NOTE — TELEPHONE ENCOUNTER
Called insurance for prior authorization for surgery code 65119. Surgery is authorized  Per Net   Ref# [id number].

## 2018-07-27 ENCOUNTER — TELEPHONE (OUTPATIENT)
Dept: OBGYN CLINIC | Facility: CLINIC | Age: 43
End: 2018-07-27

## 2018-07-27 NOTE — TELEPHONE ENCOUNTER
[facility] has requested records for benefits for patient   Date of service July 6,2018 history and physical     Fax to [phone] to scan July 27,2018

## 2018-07-30 ENCOUNTER — OFFICE VISIT (OUTPATIENT)
Dept: INTERNAL MEDICINE CLINIC | Facility: CLINIC | Age: 43
End: 2018-07-30
Payer: COMMERCIAL

## 2018-07-30 VITALS
OXYGEN SATURATION: 97 % | SYSTOLIC BLOOD PRESSURE: 112 MMHG | DIASTOLIC BLOOD PRESSURE: 78 MMHG | TEMPERATURE: 98 F | RESPIRATION RATE: 14 BRPM | BODY MASS INDEX: 35.68 KG/M2 | HEIGHT: 63 IN | HEART RATE: 83 BPM | WEIGHT: 201.38 LBS

## 2018-07-30 DIAGNOSIS — J45.30 MILD PERSISTENT ASTHMA WITHOUT COMPLICATION: Chronic | ICD-10-CM

## 2018-07-30 DIAGNOSIS — N80.9 ENDOMETRIOSIS: ICD-10-CM

## 2018-07-30 DIAGNOSIS — G40.909 SEIZURE DISORDER (HCC): Chronic | ICD-10-CM

## 2018-07-30 DIAGNOSIS — Z01.818 PREOP EXAMINATION: Primary | ICD-10-CM

## 2018-07-30 DIAGNOSIS — I45.81 LONG QT SYNDROME: Chronic | ICD-10-CM

## 2018-07-30 DIAGNOSIS — R10.2 PELVIC PAIN: ICD-10-CM

## 2018-07-30 DIAGNOSIS — Z95.0 PACEMAKER: ICD-10-CM

## 2018-07-30 PROBLEM — H91.93 BILATERAL DEAFNESS: Status: ACTIVE | Noted: 2018-07-30

## 2018-07-30 PROCEDURE — 99244 OFF/OP CNSLTJ NEW/EST MOD 40: CPT | Performed by: NURSE PRACTITIONER

## 2018-07-30 PROCEDURE — 93000 ELECTROCARDIOGRAM COMPLETE: CPT | Performed by: NURSE PRACTITIONER

## 2018-07-31 ENCOUNTER — PRIOR ORIGINAL RECORDS (OUTPATIENT)
Dept: OTHER | Age: 43
End: 2018-07-31

## 2018-08-01 ENCOUNTER — PRIOR ORIGINAL RECORDS (OUTPATIENT)
Dept: OTHER | Age: 43
End: 2018-08-01

## 2018-08-04 ENCOUNTER — NURSE ONLY (OUTPATIENT)
Dept: OBGYN CLINIC | Facility: CLINIC | Age: 43
End: 2018-08-04
Payer: COMMERCIAL

## 2018-08-04 ENCOUNTER — APPOINTMENT (OUTPATIENT)
Dept: LAB | Facility: HOSPITAL | Age: 43
End: 2018-08-04
Payer: COMMERCIAL

## 2018-08-04 DIAGNOSIS — N80.9 ENDOMETRIOSIS: ICD-10-CM

## 2018-08-04 DIAGNOSIS — N80.9 ENDOMETRIOSIS: Primary | ICD-10-CM

## 2018-08-04 DIAGNOSIS — R10.2 PELVIC PAIN: ICD-10-CM

## 2018-08-04 LAB
ANION GAP SERPL CALC-SCNC: 6 MMOL/L (ref 0–18)
BUN BLD-MCNC: 5 MG/DL (ref 8–20)
BUN/CREAT SERPL: 8.9 (ref 10–20)
CALCIUM BLD-MCNC: 9.1 MG/DL (ref 8.3–10.3)
CHLORIDE SERPL-SCNC: 107 MMOL/L (ref 101–111)
CO2 SERPL-SCNC: 31 MMOL/L (ref 22–32)
CREAT BLD-MCNC: 0.56 MG/DL (ref 0.55–1.02)
ERYTHROCYTE [DISTWIDTH] IN BLOOD BY AUTOMATED COUNT: 12.8 % (ref 11.5–16)
GLUCOSE BLD-MCNC: 93 MG/DL (ref 70–99)
HCT VFR BLD AUTO: 38.8 % (ref 34–50)
HGB BLD-MCNC: 12.3 G/DL (ref 12–16)
MCH RBC QN AUTO: 30.6 PG (ref 27–33.2)
MCHC RBC AUTO-ENTMCNC: 31.7 G/DL (ref 31–37)
MCV RBC AUTO: 96.5 FL (ref 81–100)
OSMOLALITY SERPL CALC.SUM OF ELEC: 295 MOSM/KG (ref 275–295)
PLATELET # BLD AUTO: 203 10(3)UL (ref 150–450)
POTASSIUM SERPL-SCNC: 4 MMOL/L (ref 3.6–5.1)
RBC # BLD AUTO: 4.02 X10(6)UL (ref 3.8–5.1)
RED CELL DISTRIBUTION WIDTH-SD: 46 FL (ref 35.1–46.3)
SODIUM SERPL-SCNC: 144 MMOL/L (ref 136–144)
WBC # BLD AUTO: 4.2 X10(3) UL (ref 4–13)

## 2018-08-04 PROCEDURE — 80048 BASIC METABOLIC PNL TOTAL CA: CPT

## 2018-08-04 PROCEDURE — 36415 COLL VENOUS BLD VENIPUNCTURE: CPT

## 2018-08-04 PROCEDURE — 96372 THER/PROPH/DIAG INJ SC/IM: CPT | Performed by: NURSE PRACTITIONER

## 2018-08-04 PROCEDURE — 85027 COMPLETE CBC AUTOMATED: CPT

## 2018-08-04 PROCEDURE — 99211 OFF/OP EST MAY X REQ PHY/QHP: CPT | Performed by: NURSE PRACTITIONER

## 2018-08-04 NOTE — PROGRESS NOTES
Received fax from [employer] regarding FMLA papers for up coming surgery. Patient signed a release and paid fee. Faxed to [phone].

## 2018-08-05 ENCOUNTER — HOSPITAL (OUTPATIENT)
Dept: OTHER | Age: 43
End: 2018-08-05
Attending: INTERNAL MEDICINE

## 2018-08-05 LAB
ALBUMIN SERPL-MCNC: 3.6 GM/DL (ref 3.6–5.1)
ALP SERPL-CCNC: 93 UNIT/L (ref 45–117)
ALT SERPL-CCNC: 56 UNIT/L
ANALYZER ANC (IANC): ABNORMAL
ANION GAP SERPL CALC-SCNC: 13 MMOL/L (ref 10–20)
AST SERPL-CCNC: 33 UNIT/L
BASOPHILS # BLD: 0 THOUSAND/MCL (ref 0–0.3)
BASOPHILS NFR BLD: 0 %
BILIRUB CONJ SERPL-MCNC: 0.1 MG/DL (ref 0–0.2)
BILIRUB SERPL-MCNC: 0.2 MG/DL (ref 0.2–1)
BUN SERPL-MCNC: 5 MG/DL (ref 6–20)
BUN/CREAT SERPL: 9 (ref 7–25)
CALCIUM SERPL-MCNC: 8.5 MG/DL (ref 8.4–10.2)
CHLORIDE: 103 MMOL/L (ref 98–107)
CO2 SERPL-SCNC: 27 MMOL/L (ref 21–32)
CREAT SERPL-MCNC: 0.56 MG/DL (ref 0.51–0.95)
DIFFERENTIAL METHOD BLD: ABNORMAL
EOSINOPHIL # BLD: 0.2 THOUSAND/MCL (ref 0.1–0.5)
EOSINOPHIL NFR BLD: 5 %
ERYTHROCYTE [DISTWIDTH] IN BLOOD: 13.4 % (ref 11–15)
GLUCOSE SERPL-MCNC: 113 MG/DL (ref 65–99)
HEMATOCRIT: 37.5 % (ref 36–46.5)
HGB BLD-MCNC: 12.5 GM/DL (ref 12–15.5)
LIPASE SERPL-CCNC: 154 UNIT/L (ref 73–393)
LYMPHOCYTES # BLD: 1.3 THOUSAND/MCL (ref 1–4.8)
LYMPHOCYTES NFR BLD: 29 %
MAGNESIUM SERPL-MCNC: 1.8 MG/DL (ref 1.7–2.4)
MCH RBC QN AUTO: 31.5 PG (ref 26–34)
MCHC RBC AUTO-ENTMCNC: 33.3 GM/DL (ref 32–36.5)
MCV RBC AUTO: 94.5 FL (ref 78–100)
MONOCYTES # BLD: 0.6 THOUSAND/MCL (ref 0.3–0.9)
MONOCYTES NFR BLD: 14 %
NEUTROPHILS # BLD: 2.4 THOUSAND/MCL (ref 1.8–7.7)
NEUTROPHILS NFR BLD: 52 %
NEUTS SEG NFR BLD: ABNORMAL %
NRBC (NRBCRE): ABNORMAL
PHENYTOIN SERPL-MCNC: 16.3 MCG/ML (ref 10–20)
PLATELET # BLD: 175 THOUSAND/MCL (ref 140–450)
POTASSIUM SERPL-SCNC: 3.3 MMOL/L (ref 3.4–5.1)
PROT SERPL-MCNC: 7.4 GM/DL (ref 6.4–8.2)
RBC # BLD: 3.97 MILLION/MCL (ref 4–5.2)
SODIUM SERPL-SCNC: 140 MMOL/L (ref 135–145)
TROPONIN I SERPL HS-MCNC: <0.02 NG/ML
TROPONIN I SERPL HS-MCNC: <0.02 NG/ML
WBC # BLD: 4.6 THOUSAND/MCL (ref 4.2–11)

## 2018-08-06 LAB
ANION GAP SERPL CALC-SCNC: 12 MMOL/L (ref 10–20)
BUN SERPL-MCNC: 6 MG/DL (ref 6–20)
BUN/CREAT SERPL: 12 (ref 7–25)
CALCIUM SERPL-MCNC: 8.2 MG/DL (ref 8.4–10.2)
CHLORIDE: 106 MMOL/L (ref 98–107)
CO2 SERPL-SCNC: 29 MMOL/L (ref 21–32)
CREAT SERPL-MCNC: 0.51 MG/DL (ref 0.51–0.95)
GLUCOSE SERPL-MCNC: 105 MG/DL (ref 65–99)
MAGNESIUM SERPL-MCNC: 1.9 MG/DL (ref 1.7–2.4)
POTASSIUM SERPL-SCNC: 4 MMOL/L (ref 3.4–5.1)
SODIUM SERPL-SCNC: 143 MMOL/L (ref 135–145)

## 2018-08-06 RX ORDER — PHENYTOIN 50 MG/1
TABLET, CHEWABLE ORAL
Qty: 30 TABLET | Refills: 11 | Status: SHIPPED | OUTPATIENT
Start: 2018-08-06 | End: 2019-03-04 | Stop reason: ALTCHOICE

## 2018-08-06 NOTE — TELEPHONE ENCOUNTER
Medication: Dilantin 50 mg   Date of last refill: 2/26/2018  With 5 addt refills  [place] medication     Last office visit: 6/11/2018  Due back to clinic per last office note:  RTC yearly  Date next office

## 2018-08-07 ENCOUNTER — TELEPHONE (OUTPATIENT)
Dept: OBGYN CLINIC | Facility: CLINIC | Age: 43
End: 2018-08-07

## 2018-08-07 LAB
ANION GAP SERPL CALC-SCNC: 11 MMOL/L (ref 10–20)
BUN SERPL-MCNC: 10 MG/DL (ref 6–20)
BUN/CREAT SERPL: 20 (ref 7–25)
CALCIUM SERPL-MCNC: 8.5 MG/DL (ref 8.4–10.2)
CHLORIDE: 105 MMOL/L (ref 98–107)
CO2 SERPL-SCNC: 28 MMOL/L (ref 21–32)
CREAT SERPL-MCNC: 0.51 MG/DL (ref 0.51–0.95)
GLUCOSE SERPL-MCNC: 95 MG/DL (ref 65–99)
POTASSIUM SERPL-SCNC: 4 MMOL/L (ref 3.4–5.1)
SODIUM SERPL-SCNC: 140 MMOL/L (ref 135–145)

## 2018-08-07 NOTE — TELEPHONE ENCOUNTER
Alessia called, needs to cancel surgery.  She is in  the hospital for heart arrest. Please contact her through [address],

## 2018-08-08 ENCOUNTER — DIAGNOSTIC TRANS (OUTPATIENT)
Dept: OTHER | Age: 43
End: 2018-08-08

## 2018-08-08 LAB
ALBUMIN SERPL-MCNC: 3.3 GM/DL (ref 3.6–5.1)
ALBUMIN/GLOB SERPL: 0.8 {RATIO} (ref 1–2.4)
ALP SERPL-CCNC: 96 UNIT/L (ref 45–117)
ALT SERPL-CCNC: 56 UNIT/L
ANALYZER ANC (IANC): NORMAL
ANION GAP SERPL CALC-SCNC: 13 MMOL/L (ref 10–20)
AST SERPL-CCNC: 46 UNIT/L
BASOPHILS # BLD: 0 THOUSAND/MCL (ref 0–0.3)
BASOPHILS NFR BLD: 0 %
BILIRUB SERPL-MCNC: 0.3 MG/DL (ref 0.2–1)
BUN SERPL-MCNC: 13 MG/DL (ref 6–20)
BUN/CREAT SERPL: 25 (ref 7–25)
CALCIUM SERPL-MCNC: 8.9 MG/DL (ref 8.4–10.2)
CHLORIDE: 103 MMOL/L (ref 98–107)
CO2 SERPL-SCNC: 26 MMOL/L (ref 21–32)
CREAT SERPL-MCNC: 0.53 MG/DL (ref 0.51–0.95)
DIFFERENTIAL METHOD BLD: NORMAL
EOSINOPHIL # BLD: 0.3 THOUSAND/MCL (ref 0.1–0.5)
EOSINOPHIL NFR BLD: 5 %
ERYTHROCYTE [DISTWIDTH] IN BLOOD: 13.4 % (ref 11–15)
GLOBULIN SER-MCNC: 3.9 GM/DL (ref 2–4)
GLUCOSE SERPL-MCNC: 85 MG/DL (ref 65–99)
HEMATOCRIT: 38.5 % (ref 36–46.5)
HGB BLD-MCNC: 12.7 GM/DL (ref 12–15.5)
LYMPHOCYTES # BLD: 2.3 THOUSAND/MCL (ref 1–4.8)
LYMPHOCYTES NFR BLD: 42 %
MAGNESIUM SERPL-MCNC: 2.2 MG/DL (ref 1.7–2.4)
MCH RBC QN AUTO: 31.4 PG (ref 26–34)
MCHC RBC AUTO-ENTMCNC: 33 GM/DL (ref 32–36.5)
MCV RBC AUTO: 95.1 FL (ref 78–100)
MONOCYTES # BLD: 0.8 THOUSAND/MCL (ref 0.3–0.9)
MONOCYTES NFR BLD: 13 %
NEUTROPHILS # BLD: 2.3 THOUSAND/MCL (ref 1.8–7.7)
NEUTROPHILS NFR BLD: 40 %
NEUTS SEG NFR BLD: NORMAL %
NRBC (NRBCRE): NORMAL
PLATELET # BLD: 192 THOUSAND/MCL (ref 140–450)
POTASSIUM SERPL-SCNC: 4.1 MMOL/L (ref 3.4–5.1)
PROT SERPL-MCNC: 7.2 GM/DL (ref 6.4–8.2)
RBC # BLD: 4.05 MILLION/MCL (ref 4–5.2)
SODIUM SERPL-SCNC: 138 MMOL/L (ref 135–145)
WBC # BLD: 5.7 THOUSAND/MCL (ref 4.2–11)

## 2018-08-10 ENCOUNTER — HOSPITAL ENCOUNTER (OUTPATIENT)
Age: 43
Discharge: ANOTHER HOSPITAL | End: 2018-08-10
Attending: FAMILY MEDICINE
Payer: COMMERCIAL

## 2018-08-10 ENCOUNTER — APPOINTMENT (OUTPATIENT)
Dept: CT IMAGING | Facility: HOSPITAL | Age: 43
End: 2018-08-10
Attending: EMERGENCY MEDICINE
Payer: COMMERCIAL

## 2018-08-10 ENCOUNTER — PRIOR ORIGINAL RECORDS (OUTPATIENT)
Dept: OTHER | Age: 43
End: 2018-08-10

## 2018-08-10 ENCOUNTER — APPOINTMENT (OUTPATIENT)
Dept: GENERAL RADIOLOGY | Facility: HOSPITAL | Age: 43
End: 2018-08-10
Attending: EMERGENCY MEDICINE
Payer: COMMERCIAL

## 2018-08-10 ENCOUNTER — TELEPHONE (OUTPATIENT)
Dept: INTERNAL MEDICINE CLINIC | Facility: CLINIC | Age: 43
End: 2018-08-10

## 2018-08-10 ENCOUNTER — HOSPITAL ENCOUNTER (EMERGENCY)
Facility: HOSPITAL | Age: 43
Discharge: HOME | End: 2018-08-10
Attending: EMERGENCY MEDICINE
Payer: COMMERCIAL

## 2018-08-10 VITALS
TEMPERATURE: 98 F | DIASTOLIC BLOOD PRESSURE: 80 MMHG | RESPIRATION RATE: 18 BRPM | BODY MASS INDEX: 36.8 KG/M2 | HEIGHT: 62 IN | SYSTOLIC BLOOD PRESSURE: 110 MMHG | OXYGEN SATURATION: 97 % | WEIGHT: 200 LBS | HEART RATE: 79 BPM

## 2018-08-10 VITALS
DIASTOLIC BLOOD PRESSURE: 80 MMHG | WEIGHT: 200 LBS | TEMPERATURE: 98 F | RESPIRATION RATE: 18 BRPM | SYSTOLIC BLOOD PRESSURE: 110 MMHG | OXYGEN SATURATION: 96 % | HEART RATE: 80 BPM | BODY MASS INDEX: 36.8 KG/M2 | HEIGHT: 62 IN

## 2018-08-10 DIAGNOSIS — R11.0 NAUSEOUS: ICD-10-CM

## 2018-08-10 DIAGNOSIS — R10.9 ABDOMINAL PAIN OF UNKNOWN ETIOLOGY: Primary | ICD-10-CM

## 2018-08-10 DIAGNOSIS — Z87.898 HISTORY OF FEVER: ICD-10-CM

## 2018-08-10 DIAGNOSIS — R74.8 ELEVATED LIVER ENZYMES: ICD-10-CM

## 2018-08-10 DIAGNOSIS — N83.202 LEFT OVARIAN CYST: Primary | ICD-10-CM

## 2018-08-10 LAB
ALBUMIN SERPL-MCNC: 3.6 G/DL (ref 3.5–4.8)
ALBUMIN/GLOB SERPL: 0.8 {RATIO} (ref 1–2)
ALP LIVER SERPL-CCNC: 105 U/L (ref 37–98)
ALT SERPL-CCNC: 67 U/L (ref 14–54)
ANION GAP SERPL CALC-SCNC: 6 MMOL/L (ref 0–18)
AST SERPL-CCNC: 46 U/L (ref 15–41)
ATRIAL RATE: 80 BPM
BASOPHILS # BLD AUTO: 0.04 X10(3) UL (ref 0–0.1)
BASOPHILS NFR BLD AUTO: 0.8 %
BILIRUB SERPL-MCNC: 0.3 MG/DL (ref 0.1–2)
BILIRUB UR QL STRIP.AUTO: NEGATIVE
BUN BLD-MCNC: 9 MG/DL (ref 8–20)
BUN/CREAT SERPL: 14.1 (ref 10–20)
CALCIUM BLD-MCNC: 9 MG/DL (ref 8.3–10.3)
CHLORIDE SERPL-SCNC: 104 MMOL/L (ref 101–111)
CLARITY UR REFRACT.AUTO: CLEAR
CO2 SERPL-SCNC: 31 MMOL/L (ref 22–32)
CREAT BLD-MCNC: 0.64 MG/DL (ref 0.55–1.02)
EOSINOPHIL # BLD AUTO: 0.33 X10(3) UL (ref 0–0.3)
EOSINOPHIL NFR BLD AUTO: 6.8 %
ERYTHROCYTE [DISTWIDTH] IN BLOOD BY AUTOMATED COUNT: 13.1 % (ref 11.5–16)
GLOBULIN PLAS-MCNC: 4.3 G/DL (ref 2.5–3.7)
GLUCOSE BLD-MCNC: 101 MG/DL (ref 70–99)
GLUCOSE UR STRIP.AUTO-MCNC: NEGATIVE MG/DL
HCT VFR BLD AUTO: 40.6 % (ref 34–50)
HGB BLD-MCNC: 13.2 G/DL (ref 12–16)
IMMATURE GRANULOCYTE COUNT: 0 X10(3) UL (ref 0–1)
IMMATURE GRANULOCYTE RATIO %: 0 %
KETONES UR STRIP.AUTO-MCNC: NEGATIVE MG/DL
LACTIC ACID: 1.7 MMOL/L (ref 0.5–2)
LEUKOCYTE ESTERASE UR QL STRIP.AUTO: NEGATIVE
LIPASE: 264 U/L (ref 73–393)
LYMPHOCYTES # BLD AUTO: 1.4 X10(3) UL (ref 0.9–4)
LYMPHOCYTES NFR BLD AUTO: 29 %
M PROTEIN MFR SERPL ELPH: 7.9 G/DL (ref 6.1–8.3)
MCH RBC QN AUTO: 31 PG (ref 27–33.2)
MCHC RBC AUTO-ENTMCNC: 32.5 G/DL (ref 31–37)
MCV RBC AUTO: 95.3 FL (ref 81–100)
MONOCYTES # BLD AUTO: 0.67 X10(3) UL (ref 0.1–1)
MONOCYTES NFR BLD AUTO: 13.9 %
NEUTROPHIL ABS PRELIM: 2.38 X10 (3) UL (ref 1.3–6.7)
NEUTROPHILS # BLD AUTO: 2.38 X10(3) UL (ref 1.3–6.7)
NEUTROPHILS NFR BLD AUTO: 49.5 %
NITRITE UR QL STRIP.AUTO: NEGATIVE
OSMOLALITY SERPL CALC.SUM OF ELEC: 291 MOSM/KG (ref 275–295)
P AXIS: 57 DEGREES
P-R INTERVAL: 240 MS
PH UR STRIP.AUTO: 6 [PH] (ref 4.5–8)
PHENYTOIN (DILANTIN): 18.5 UG/ML (ref 10–20)
PLATELET # BLD AUTO: 199 10(3)UL (ref 150–450)
POCT LOT NUMBER: NORMAL
POCT URINE PREGNANCY: NEGATIVE
POTASSIUM SERPL-SCNC: 3.9 MMOL/L (ref 3.6–5.1)
PROCEDURE CONTROL: NORMAL
PROT UR STRIP.AUTO-MCNC: NEGATIVE MG/DL
Q-T INTERVAL: 456 MS
QRS DURATION: 62 MS
QTC CALCULATION (BEZET): 525 MS
R AXIS: 49 DEGREES
RBC # BLD AUTO: 4.26 X10(6)UL (ref 3.8–5.1)
RBC UR QL AUTO: NEGATIVE
RED CELL DISTRIBUTION WIDTH-SD: 45.4 FL (ref 35.1–46.3)
SODIUM SERPL-SCNC: 141 MMOL/L (ref 136–144)
SP GR UR STRIP.AUTO: <1.005 (ref 1–1.03)
T AXIS: 75 DEGREES
UROBILINOGEN UR STRIP.AUTO-MCNC: <2 MG/DL
VENTRICULAR RATE: 80 BPM
WBC # BLD AUTO: 4.8 X10(3) UL (ref 4–13)

## 2018-08-10 PROCEDURE — 81025 URINE PREGNANCY TEST: CPT

## 2018-08-10 PROCEDURE — 93005 ELECTROCARDIOGRAM TRACING: CPT

## 2018-08-10 PROCEDURE — 74177 CT ABD & PELVIS W/CONTRAST: CPT | Performed by: EMERGENCY MEDICINE

## 2018-08-10 PROCEDURE — 83690 ASSAY OF LIPASE: CPT | Performed by: EMERGENCY MEDICINE

## 2018-08-10 PROCEDURE — 99285 EMERGENCY DEPT VISIT HI MDM: CPT

## 2018-08-10 PROCEDURE — 83605 ASSAY OF LACTIC ACID: CPT | Performed by: EMERGENCY MEDICINE

## 2018-08-10 PROCEDURE — 80053 COMPREHEN METABOLIC PANEL: CPT | Performed by: EMERGENCY MEDICINE

## 2018-08-10 PROCEDURE — 96361 HYDRATE IV INFUSION ADD-ON: CPT

## 2018-08-10 PROCEDURE — 96360 HYDRATION IV INFUSION INIT: CPT

## 2018-08-10 PROCEDURE — 93010 ELECTROCARDIOGRAM REPORT: CPT

## 2018-08-10 PROCEDURE — 99215 OFFICE O/P EST HI 40 MIN: CPT

## 2018-08-10 PROCEDURE — 71046 X-RAY EXAM CHEST 2 VIEWS: CPT | Performed by: EMERGENCY MEDICINE

## 2018-08-10 PROCEDURE — 80185 ASSAY OF PHENYTOIN TOTAL: CPT | Performed by: EMERGENCY MEDICINE

## 2018-08-10 PROCEDURE — 85025 COMPLETE CBC W/AUTO DIFF WBC: CPT | Performed by: EMERGENCY MEDICINE

## 2018-08-10 PROCEDURE — 99214 OFFICE O/P EST MOD 30 MIN: CPT

## 2018-08-10 PROCEDURE — 81003 URINALYSIS AUTO W/O SCOPE: CPT | Performed by: EMERGENCY MEDICINE

## 2018-08-10 PROCEDURE — 93010 ELECTROCARDIOGRAM REPORT: CPT | Performed by: INTERNAL MEDICINE

## 2018-08-10 RX ORDER — PROPRANOLOL HYDROCHLORIDE 160 MG/1
640 CAPSULE, EXTENDED RELEASE ORAL
COMMUNITY
End: 2018-12-07 | Stop reason: ALTCHOICE

## 2018-08-10 RX ORDER — SODIUM CHLORIDE 9 MG/ML
125 INJECTION, SOLUTION INTRAVENOUS CONTINUOUS
Status: DISCONTINUED | OUTPATIENT
Start: 2018-08-10 | End: 2018-08-10

## 2018-08-10 NOTE — ED PROVIDER NOTES
Patient Seen in: [facility] Emergency Department    History   Patient presents with:  Abdomen/Flank Pain (GI/)    Stated Complaint: abd pain    HPI    69-year-old woman who is deaf. Communication via writing on a piece of paper.   We are requestin loss    • Wheezing        Past Surgical History:  2015: CARDIAC DEFIBRILLATOR PLACEMENT      Comment: dual chamber  1992: [address]: CARDIAC PACEMAKER PLACEMENT  No date: CU (CHRONIC URTICARIA) INDEX  No date: EGD  No date: ORAL QUENTIN Course     Labs Reviewed   COMP METABOLIC PANEL (14) - Abnormal; Notable for the following:        Result Value    Glucose 101 (*)     AST 46 (*)     Alt 67 (*)     Alkaline Phosphatase 105 (*)     Globulin  4.3 (*)     A/G Ratio 0.8 (*)     All other comp 2055  ------------------------------------------------------------      [facility]       Results of patient's tests and discharge instructions were relayed to her.         Disposition and Plan     Clinical Impression:  Left ovarian cyst  (primary encounter diagnos

## 2018-08-10 NOTE — ED NOTES
Patient has been communicating with RN and MD by writing since she is deaf. She declined use of IPad , stating that she wants a live  called in. Nursing sup notified and will do so.  Patient also requested ultrasound IV after 1 attempt

## 2018-08-10 NOTE — TELEPHONE ENCOUNTER
Noted below. Did talk to the  about a HFU (but needs TCM). Will need to follow up on Monday because the patient was going to the  this afternoon for her symptoms of illness (see below message).  Will brina this encounter as a follow up on Monday

## 2018-08-10 NOTE — TELEPHONE ENCOUNTER
Noted below and already placed the patient's medical records in Dr. North ascencio and forwarded this encounter to her. Will continue to hold in Phone Call pool for follow up with patient on Monday morning, 08/13/18.

## 2018-08-10 NOTE — TELEPHONE ENCOUNTER
Incoming (mail or fax): Fax  Received from:  [employer] for patient. 92 pages. Documentation given to:  [id number] [employer] fax bin.

## 2018-08-10 NOTE — ED INITIAL ASSESSMENT (HPI)
Patient is deaf, was DC home from [facility] yesterday after having cardiac arrest on Sunday. Today she has fever and chills.  No chest pain

## 2018-08-10 NOTE — TELEPHONE ENCOUNTER
An  for the patient called stating that the patient was admitted into [facility] on Sunday, 08/05/18 for cardiac arrest. The patient was discharged yesterday.  Her cardiologist is Dr. Caroline Greenberg who discontinued her metoprolol in

## 2018-08-10 NOTE — ED PROVIDER NOTES
Patient Seen in: [address]    History   Patient presents with:  Fever    Stated Complaint: FEVER     HPI  This is a 12-year-old female with history of hearing impairment/and communicates with sign language, I had performed congenital prolonged QT Syndrome   • Leaking of urine    • Leg swelling    • Menses painful    • Mouth sores    • Nausea    • Osteoarthritis    • Pacemaker    • Pain in joints    • Pain with bowel movements    • Painful urination    • Seizure disorder ([address] (Exact Date)   SpO2 96%   BMI 36.58 kg/m²         Physical Exam      GEN: Not in any acute distress, able to communicate well with me with a written conversation, answering appropriately   SKIN: No pallor, no erythema, no cyanosis, warm and dry  Eyes: wnl,

## 2018-08-10 NOTE — ED INITIAL ASSESSMENT (HPI)
Pt presents today with c/o fever that started this morning when she woke up. Pt is deaf. Pt states that she was discharged from [facility] yesterday after having a cardiac arrest on Sunday.  Pt states that the cardiac arrest occurred because sh

## 2018-08-12 LAB
ATRIAL RATE: 80 BPM
P AXIS: 52 DEGREES
P-R INTERVAL: 242 MS
Q-T INTERVAL: 456 MS
QRS DURATION: 62 MS
QTC CALCULATION (BEZET): 525 MS
R AXIS: 33 DEGREES
T AXIS: 82 DEGREES
VENTRICULAR RATE: 80 BPM

## 2018-08-13 ENCOUNTER — TELEPHONE (OUTPATIENT)
Dept: INTERNAL MEDICINE CLINIC | Facility: CLINIC | Age: 43
End: 2018-08-13

## 2018-08-13 ENCOUNTER — PATIENT OUTREACH (OUTPATIENT)
Dept: CASE MANAGEMENT | Age: 43
End: 2018-08-13

## 2018-08-13 DIAGNOSIS — Z02.9 ENCOUNTERS FOR ADMINISTRATIVE PURPOSE: ICD-10-CM

## 2018-08-13 NOTE — TELEPHONE ENCOUNTER
PSR - will need  for visit this week. Can cancel 8/23/18 HFU visit but also will need to cancel that . Thanks! Pt called for HFU,  on line. Needed HFU this week so can return to work.  Extended visit scheduled with Dr Pepito Briceno

## 2018-08-13 NOTE — TELEPHONE ENCOUNTER
PSR sent fax to cancel  for 8/23/18 visit, refaxed form today, for  on 8/15/18, fax confirmed, awaiting fax.

## 2018-08-14 ENCOUNTER — PRIOR ORIGINAL RECORDS (OUTPATIENT)
Dept: OTHER | Age: 43
End: 2018-08-14

## 2018-08-14 NOTE — TELEPHONE ENCOUNTER
Ana Hernandez from [address] [place] [address] called, she is having a hard time getting a , will call back by noon if we cannot get a . Then PSR to reschedule.

## 2018-08-15 ENCOUNTER — TELEPHONE (OUTPATIENT)
Dept: OBGYN CLINIC | Facility: CLINIC | Age: 43
End: 2018-08-15

## 2018-08-15 ENCOUNTER — OFFICE VISIT (OUTPATIENT)
Dept: INTERNAL MEDICINE CLINIC | Facility: CLINIC | Age: 43
End: 2018-08-15
Payer: COMMERCIAL

## 2018-08-15 VITALS
TEMPERATURE: 98 F | BODY MASS INDEX: 36.5 KG/M2 | SYSTOLIC BLOOD PRESSURE: 112 MMHG | HEART RATE: 76 BPM | RESPIRATION RATE: 12 BRPM | DIASTOLIC BLOOD PRESSURE: 78 MMHG | HEIGHT: 62 IN | WEIGHT: 198.38 LBS

## 2018-08-15 DIAGNOSIS — R10.2 PELVIC PAIN: ICD-10-CM

## 2018-08-15 DIAGNOSIS — I45.81 LONG QT SYNDROME: Chronic | ICD-10-CM

## 2018-08-15 DIAGNOSIS — R55 SYNCOPE, UNSPECIFIED SYNCOPE TYPE: ICD-10-CM

## 2018-08-15 DIAGNOSIS — G40.909 SEIZURE DISORDER (HCC): Chronic | ICD-10-CM

## 2018-08-15 DIAGNOSIS — R74.8 ELEVATED LIVER ENZYMES: ICD-10-CM

## 2018-08-15 PROCEDURE — 99214 OFFICE O/P EST MOD 30 MIN: CPT | Performed by: INTERNAL MEDICINE

## 2018-08-15 PROCEDURE — 1111F DSCHRG MED/CURRENT MED MERGE: CPT | Performed by: INTERNAL MEDICINE

## 2018-08-15 RX ORDER — MAGNESIUM OXIDE TAB 400 MG (241.3 MG ELEMENTAL MG) 400 (241.3 MG) MG
TAB ORAL
Refills: 3 | COMMUNITY
Start: 2018-08-08 | End: 2020-02-17

## 2018-08-15 RX ORDER — POTASSIUM CHLORIDE 20 MEQ/1
TABLET, EXTENDED RELEASE ORAL
Refills: 3 | COMMUNITY
Start: 2018-08-08

## 2018-08-15 NOTE — TELEPHONE ENCOUNTER
Received call back from patient through the . Patient aware needs a letter of medical clearance from cardiologist sent to Dr Selene Blackwood before proceeding with surgery. Fax number given.

## 2018-08-15 NOTE — PROGRESS NOTES
[facility]    CHIEF COMPLAINT:  Patient presents with:  Hospital F/U        HISTORY OF PRESENT ILLNESS:  Here for follow up hospital stay. Admitted on 8/5/18 to [facility] after a syncopal episode.  Reviewed records from [facility]. Pt was Disp: 30 tablet Rfl: 11   Phenytoin Sodium Extended 100 MG Oral Cap Take 4 capsules (400 mg total) by mouth daily. (Patient taking differently: Take 450 mg by mouth daily.  Takes every morning ) Disp: 120 capsule Rfl: 11   TraMADol HCl 50 MG Oral Tab Take 1 or performed during the hospital encounter of 10/48/02  -COMP METABOLIC PANEL (14)   Result Value Ref Range   Glucose 101 (H) 70 - 99 mg/dL   Sodium 141 136 - 144 mmol/L   Potassium 3.9 3.6 - 5.1 mmol/L   Chloride 104 101 - 111 mmol/L   CO2 31.0 22.0 - 32. Axis 33 degrees   T Axis 82 degrees   -RAINBOW DRAW BLUE   Result Value Ref Range   Hold Blue Auto Resulted    -RAINBOW DRAW LAVENDER   Result Value Ref Range   Hold Lavender Auto Resulted    -RAINBOW DRAW LIGHT GREEN   Result Value Ref Range   Hold Lt Gre colonoscopy. Return to clinic in 3 months for med check.     Julia Dickerson MD

## 2018-08-15 NOTE — TELEPHONE ENCOUNTER
PC with patient through her . Patient wants to reschedule surgery. States she had a cardiac arrest on 8/5/2018. States due to medication she was on that wasn't working. States has issues with her heart. She is on a new medication.

## 2018-08-15 NOTE — TELEPHONE ENCOUNTER
Dr David Sr nurse called requesting a statement from us indicating what her procedure will be so they can provide cardiac clearance.     Fax number [phone]

## 2018-08-16 ENCOUNTER — TELEPHONE (OUTPATIENT)
Dept: OBGYN CLINIC | Facility: CLINIC | Age: 43
End: 2018-08-16

## 2018-08-16 NOTE — TELEPHONE ENCOUNTER
Patient called through interpretor in the late afternoon. Naina Bullock from cardiologist- Dr. Bobby Gonzalez office wants a letter regarding what type of surgery is being done and any information regarding why. Fax- [phone]. Phone [phone].

## 2018-08-17 ENCOUNTER — TELEPHONE (OUTPATIENT)
Dept: OBGYN CLINIC | Facility: CLINIC | Age: 43
End: 2018-08-17

## 2018-08-17 ENCOUNTER — TELEPHONE (OUTPATIENT)
Dept: INTERNAL MEDICINE CLINIC | Facility: CLINIC | Age: 43
End: 2018-08-17

## 2018-08-17 NOTE — TELEPHONE ENCOUNTER
PC with patient through . She states her colonscopy was rescheduled to 9/7/2018 due to being in the hospital.. She will speak with Dr Charly Reyez regarding medical clearance. Aware letter faxed to Dr Nuvia Bashir.

## 2018-08-17 NOTE — PROGRESS NOTES
Multiple attempts to reach pt with no return call or message. Pt went in for HFU appt with PCP on 8/15/18. Encounter closing.

## 2018-08-17 NOTE — TELEPHONE ENCOUNTER
LM for the patient to call us back to schedule her pre op appointment. The pre op appointment needs to be done within 30 days, so as soon as she finds out when her surgery date is (see below) she should call and schedule something.

## 2018-08-17 NOTE — TELEPHONE ENCOUNTER
Spoke with Heather Kay at Dr. Mary Rodriges (cardiologist) office .  They need a letter stating what the surgery is, anesthetic, how long the surgery will last and if any of Alessia's medication need to be discontinued before surgery before determining clearance for surger

## 2018-08-17 NOTE — TELEPHONE ENCOUNTER
Pt states that Dr Tyrese Dominguez office needs clearance from our office for hysterectomy. Surgery has not been scheduled yet, needs clearance.  Pt states that Dr Tyrese Dominguez office has contacted cardiologist. Thank you

## 2018-08-17 NOTE — TELEPHONE ENCOUNTER
Message left for patient to call back through interperter regarding surgery and medical/cardiology clearance for surgery. Letter was faxed to Dr. Isaac Whittaker at [phone].

## 2018-08-21 ENCOUNTER — TELEPHONE (OUTPATIENT)
Dept: INTERNAL MEDICINE CLINIC | Facility: CLINIC | Age: 43
End: 2018-08-21

## 2018-08-21 ENCOUNTER — PRIOR ORIGINAL RECORDS (OUTPATIENT)
Dept: OTHER | Age: 43
End: 2018-08-21

## 2018-08-22 ENCOUNTER — PRIOR ORIGINAL RECORDS (OUTPATIENT)
Dept: OTHER | Age: 43
End: 2018-08-22

## 2018-08-22 ENCOUNTER — HOSPITAL ENCOUNTER (EMERGENCY)
Facility: HOSPITAL | Age: 43
Discharge: HOME | End: 2018-08-22
Attending: EMERGENCY MEDICINE
Payer: COMMERCIAL

## 2018-08-22 VITALS
OXYGEN SATURATION: 98 % | HEART RATE: 79 BPM | TEMPERATURE: 98 F | RESPIRATION RATE: 15 BRPM | DIASTOLIC BLOOD PRESSURE: 75 MMHG | SYSTOLIC BLOOD PRESSURE: 98 MMHG

## 2018-08-22 DIAGNOSIS — R00.2 PALPITATIONS: Primary | ICD-10-CM

## 2018-08-22 LAB
ALBUMIN SERPL-MCNC: 3.4 G/DL (ref 3.5–4.8)
ALBUMIN/GLOB SERPL: 0.9 {RATIO} (ref 1–2)
ALP LIVER SERPL-CCNC: 105 U/L (ref 37–98)
ALT SERPL-CCNC: 40 U/L (ref 14–54)
ANION GAP SERPL CALC-SCNC: 7 MMOL/L (ref 0–18)
AST SERPL-CCNC: 31 U/L (ref 15–41)
ATRIAL RATE: 80 BPM
BASOPHILS # BLD AUTO: 0.04 X10(3) UL (ref 0–0.1)
BASOPHILS NFR BLD AUTO: 0.9 %
BILIRUB SERPL-MCNC: 0.2 MG/DL (ref 0.1–2)
BUN BLD-MCNC: 8 MG/DL (ref 8–20)
BUN/CREAT SERPL: 11.8 (ref 10–20)
CALCIUM BLD-MCNC: 8.6 MG/DL (ref 8.3–10.3)
CHLORIDE SERPL-SCNC: 106 MMOL/L (ref 101–111)
CO2 SERPL-SCNC: 28 MMOL/L (ref 22–32)
CREAT BLD-MCNC: 0.68 MG/DL (ref 0.55–1.02)
EOSINOPHIL # BLD AUTO: 0.31 X10(3) UL (ref 0–0.3)
EOSINOPHIL NFR BLD AUTO: 7.1 %
ERYTHROCYTE [DISTWIDTH] IN BLOOD BY AUTOMATED COUNT: 12.9 % (ref 11.5–16)
GLOBULIN PLAS-MCNC: 3.8 G/DL (ref 2.5–4)
GLUCOSE BLD-MCNC: 114 MG/DL (ref 70–99)
HAV IGM SER QL: 2.1 MG/DL (ref 1.8–2.5)
HCT VFR BLD AUTO: 39.1 % (ref 34–50)
HGB BLD-MCNC: 12.6 G/DL (ref 12–16)
IMMATURE GRANULOCYTE COUNT: 0 X10(3) UL (ref 0–1)
IMMATURE GRANULOCYTE RATIO %: 0 %
LYMPHOCYTES # BLD AUTO: 1.31 X10(3) UL (ref 0.9–4)
LYMPHOCYTES NFR BLD AUTO: 29.9 %
M PROTEIN MFR SERPL ELPH: 7.2 G/DL (ref 6.1–8.3)
MCH RBC QN AUTO: 31.2 PG (ref 27–33.2)
MCHC RBC AUTO-ENTMCNC: 32.2 G/DL (ref 31–37)
MCV RBC AUTO: 96.8 FL (ref 81–100)
MONOCYTES # BLD AUTO: 0.51 X10(3) UL (ref 0.1–1)
MONOCYTES NFR BLD AUTO: 11.6 %
NEUTROPHIL ABS PRELIM: 2.21 X10 (3) UL (ref 1.3–6.7)
NEUTROPHILS # BLD AUTO: 2.21 X10(3) UL (ref 1.3–6.7)
NEUTROPHILS NFR BLD AUTO: 50.5 %
OSMOLALITY SERPL CALC.SUM OF ELEC: 291 MOSM/KG (ref 275–295)
P AXIS: 59 DEGREES
P-R INTERVAL: 224 MS
PLATELET # BLD AUTO: 203 10(3)UL (ref 150–450)
POTASSIUM SERPL-SCNC: 3.9 MMOL/L (ref 3.6–5.1)
Q-T INTERVAL: 442 MS
QRS DURATION: 64 MS
QTC CALCULATION (BEZET): 509 MS
R AXIS: 38 DEGREES
RBC # BLD AUTO: 4.04 X10(6)UL (ref 3.8–5.1)
RED CELL DISTRIBUTION WIDTH-SD: 46 FL (ref 35.1–46.3)
SODIUM SERPL-SCNC: 141 MMOL/L (ref 136–144)
T AXIS: 81 DEGREES
TROPONIN I SERPL-MCNC: <0.046 NG/ML (ref ?–0.05)
VENTRICULAR RATE: 80 BPM
WBC # BLD AUTO: 4.4 X10(3) UL (ref 4–13)

## 2018-08-22 PROCEDURE — 83735 ASSAY OF MAGNESIUM: CPT | Performed by: EMERGENCY MEDICINE

## 2018-08-22 PROCEDURE — 80053 COMPREHEN METABOLIC PANEL: CPT | Performed by: EMERGENCY MEDICINE

## 2018-08-22 PROCEDURE — 36415 COLL VENOUS BLD VENIPUNCTURE: CPT

## 2018-08-22 PROCEDURE — 93005 ELECTROCARDIOGRAM TRACING: CPT

## 2018-08-22 PROCEDURE — 85025 COMPLETE CBC W/AUTO DIFF WBC: CPT | Performed by: EMERGENCY MEDICINE

## 2018-08-22 PROCEDURE — 93010 ELECTROCARDIOGRAM REPORT: CPT

## 2018-08-22 PROCEDURE — 99285 EMERGENCY DEPT VISIT HI MDM: CPT

## 2018-08-22 PROCEDURE — 84484 ASSAY OF TROPONIN QUANT: CPT | Performed by: EMERGENCY MEDICINE

## 2018-08-22 NOTE — ED INITIAL ASSESSMENT (HPI)
Pt had a cardiac arrest two weeks ago. She reports having palpitations that started 30 minutes and feeling lightheaded. She is still feeling lightheaded.

## 2018-08-22 NOTE — TELEPHONE ENCOUNTER
Msg sent to [facility] to follow up on Mahsa's msg. Pre-op requirements not yet sent to Dr Onur Wells office since colonoscopy not until 9/7/18 and hysterectomy not yet rescheduled. Pt asked to update us asap when she knows surgery date.

## 2018-08-22 NOTE — ED PROVIDER NOTES
Patient Seen in: [facility] Emergency Department    History   Patient presents with:  Arrythmia/Palpitations (cardiovascular)    Stated Complaint:     HPI    Patient is a 10-year-old female comes emergency room for evaluation of palpitations.   Yudelka • Mouth sores    • Osteoarthritis    • Pacemaker    • Seizure disorder [facility])     last Sezure 2017   • Shortness of breath    • Visual impairment     glasses   • Weight loss        Past Surgical History:  2015: CARDIAC DEFIBRILLATOR PLACEMENT      Comment to auscultation bilaterally, no wheezing, no rales, no rhonchi. CARDIOVASCULAR: Regular rate and rhythm. Normal S1S2. No S3S4 or murmur. ABDOMEN: Bowel sounds are present. Soft. nondistended, no pulsatile masses.  nontender    MUSCULOSKELETAL: No calf t Scientific rep as well as Dr. Milady aCrreno after interrogation. Patient informed of results. Metabolic workup unremarkable. Recommend follow-up with her electrophysiologist in 1 week. Patient was screened and evaluated during this visit.    As a treating p

## 2018-08-23 ENCOUNTER — TELEPHONE (OUTPATIENT)
Dept: OBGYN CLINIC | Facility: CLINIC | Age: 43
End: 2018-08-23

## 2018-08-23 DIAGNOSIS — J45.20 MILD INTERMITTENT ASTHMA WITHOUT COMPLICATION: ICD-10-CM

## 2018-08-24 RX ORDER — MONTELUKAST SODIUM 10 MG/1
TABLET ORAL
Qty: 30 TABLET | Refills: 2 | Status: SHIPPED | OUTPATIENT
Start: 2018-08-24 | End: 2018-11-17

## 2018-08-24 NOTE — TELEPHONE ENCOUNTER
Last OV relevant to medication: 6/18/18  Last refill date: 7/13/18     #/refills: 30/0  When pt was asked to return for OV: 7 months - PE  Upcoming appt/reason: 11/19/18 - F/Up Patricio Osman

## 2018-08-27 NOTE — TELEPHONE ENCOUNTER
Received call from patient through . Aware she needs an appointment to discuss about surgery and next Lupron injection. Appointment 8/31/2018.

## 2018-08-28 ENCOUNTER — APPOINTMENT (OUTPATIENT)
Dept: LAB | Age: 43
End: 2018-08-28
Attending: PHYSICIAN ASSISTANT
Payer: COMMERCIAL

## 2018-08-28 ENCOUNTER — OFFICE VISIT (OUTPATIENT)
Dept: NEUROLOGY | Facility: CLINIC | Age: 43
End: 2018-08-28
Payer: COMMERCIAL

## 2018-08-28 ENCOUNTER — TELEPHONE (OUTPATIENT)
Dept: NEUROLOGY | Facility: CLINIC | Age: 43
End: 2018-08-28

## 2018-08-28 VITALS
BODY MASS INDEX: 36.99 KG/M2 | DIASTOLIC BLOOD PRESSURE: 74 MMHG | RESPIRATION RATE: 16 BRPM | HEIGHT: 62 IN | HEART RATE: 83 BPM | SYSTOLIC BLOOD PRESSURE: 107 MMHG | WEIGHT: 201 LBS

## 2018-08-28 DIAGNOSIS — G40.909 SEIZURE DISORDER (HCC): Primary | Chronic | ICD-10-CM

## 2018-08-28 DIAGNOSIS — G40.909 SEIZURE DISORDER (HCC): Chronic | ICD-10-CM

## 2018-08-28 DIAGNOSIS — I45.81: ICD-10-CM

## 2018-08-28 DIAGNOSIS — I45.81 LONG QT SYNDROME: Chronic | ICD-10-CM

## 2018-08-28 PROCEDURE — 80186 ASSAY OF PHENYTOIN FREE: CPT | Performed by: PHYSICIAN ASSISTANT

## 2018-08-28 PROCEDURE — 36415 COLL VENOUS BLD VENIPUNCTURE: CPT | Performed by: PHYSICIAN ASSISTANT

## 2018-08-28 PROCEDURE — 1111F DSCHRG MED/CURRENT MED MERGE: CPT | Performed by: PHYSICIAN ASSISTANT

## 2018-08-28 PROCEDURE — 80185 ASSAY OF PHENYTOIN TOTAL: CPT | Performed by: PHYSICIAN ASSISTANT

## 2018-08-28 PROCEDURE — 99214 OFFICE O/P EST MOD 30 MIN: CPT | Performed by: PHYSICIAN ASSISTANT

## 2018-08-28 RX ORDER — PHENYTOIN SODIUM 100 MG/1
400 CAPSULE, EXTENDED RELEASE ORAL DAILY
COMMUNITY
End: 2019-09-12 | Stop reason: ALTCHOICE

## 2018-08-28 NOTE — PATIENT INSTRUCTIONS
Refill policies:    • Allow 2-3 business days for refills; controlled substances may take longer.   • Contact your pharmacy at least 5 days prior to running out of medication and have them send an electronic request or submit request through the “request re entire amount billed. Precertification and Prior Authorizations: If your physician has recommended that you have a procedure or additional testing performed.   [facility]) will contact your insurance carrier to obtain pre-certi

## 2018-08-28 NOTE — PROGRESS NOTES
[facility] with [address]  11/20/1975  Primary Care Provider:  Seymour Gramajo MD    8/28/2018  Accompanied visit:  () No (X) yes-    38year old female patient jolanta MCG/ACT Inhalation Aerosol, Inhale 2 puffs into the lungs every 6 (six) hours as needed for Wheezing., Disp: 1 Inhaler, Rfl: 0  •  Spacer/Aero Chamber Mouthpiece Does not apply Misc, Use with inhaler. , Disp: 1 each, Rfl: 0  •  Budesonide-Formoterol Fumarat syndrome    Discussion on the case:  Patient with recent hospitalization at the end of August for syncope spell.  The spell was different from her typical seizure spell and upon review of records from the hospitalization her AICD was interrogated and showed was discussed with Dr. Candy Mcdermott. Plan was discussed with patient and she expressed full understanding.

## 2018-08-30 ENCOUNTER — TELEPHONE (OUTPATIENT)
Dept: INTERNAL MEDICINE CLINIC | Facility: CLINIC | Age: 43
End: 2018-08-30

## 2018-08-30 LAB
PERCENT FREE PHENYTOIN: 9 %
PHENYTOIN FREE LEVEL: 2.3 UG/ML
TOTAL PHENYTOIN: 25.6 UG/ML

## 2018-08-30 NOTE — TELEPHONE ENCOUNTER
Incoming (mail or fax):  fax  Received from:  [facility]  Documentation given to:  Dr Nyra Leventhal

## 2018-08-31 ENCOUNTER — OFFICE VISIT (OUTPATIENT)
Dept: OBGYN CLINIC | Facility: CLINIC | Age: 43
End: 2018-08-31
Payer: COMMERCIAL

## 2018-08-31 VITALS
HEIGHT: 62 IN | BODY MASS INDEX: 36.44 KG/M2 | TEMPERATURE: 98 F | SYSTOLIC BLOOD PRESSURE: 108 MMHG | DIASTOLIC BLOOD PRESSURE: 60 MMHG | WEIGHT: 198 LBS

## 2018-08-31 DIAGNOSIS — R10.2 PELVIC PAIN: ICD-10-CM

## 2018-08-31 DIAGNOSIS — N80.9 ENDOMETRIOSIS: Primary | ICD-10-CM

## 2018-08-31 PROCEDURE — 96372 THER/PROPH/DIAG INJ SC/IM: CPT | Performed by: NURSE PRACTITIONER

## 2018-08-31 PROCEDURE — 99213 OFFICE O/P EST LOW 20 MIN: CPT | Performed by: NURSE PRACTITIONER

## 2018-08-31 NOTE — PROGRESS NOTES
Lupron Depot given in Right upper gluteal. Tolerated well. Aware next injection to be given in 4 weeks.

## 2018-08-31 NOTE — PROGRESS NOTES
Kailee Fisher is a 38year old female. HPI:   Patient presents with: Other: consult surgery/lupron injection      Pt presents today to discuss her heart \"arrest\" and plans for her MARKOS.  Pt was suppose to undergo an MARKOS earlier this month with JAMES Aerosol Inhale 1 puff into the lungs 2 (two) times daily. Disp: 3 Inhaler Rfl: 0   EPINEPHrine (EPIPEN 2-EYAD) 0.3 MG/0.3ML Injection Solution Auto-injector Inject 0.3 mL (1 each total) as directed one time.  Disp: 1 each Rfl: 0   BENADRYL 25 MG OR CAPS 2 CA PLACEMENT  No date: CU (CHRONIC URTICARIA) INDEX  No date: EGD  No date: ORAL SURGERY PROCEDURE      Comment: wisdom teeth  No date: OTHER SURGICAL HISTORY      Comment: bunion  No date: OTHER SURGICAL HISTORY      Comment: knee left scope  not acl  09/15/ would like to reschedule [facility] and discuss this with Dr. Millicent Molina. Informed we would need full physical and medical clearance from the cardiologist before we could proceed with surgery. 1. Endometriosis      2.  Pelvic pain      8/31/2018  FELICITAS Asencio

## 2018-09-06 ENCOUNTER — TELEPHONE (OUTPATIENT)
Dept: NEUROLOGY | Facility: CLINIC | Age: 43
End: 2018-09-06

## 2018-09-06 DIAGNOSIS — G40.909 SEIZURE DISORDER (HCC): Primary | Chronic | ICD-10-CM

## 2018-09-06 NOTE — PAT NURSING NOTE
I spoke with KARLA again at [address] [place] [address] are she state that she has a call out, email and text message out to the interpretor to inform her of the arrival time that has been changed from her arrival at 0800 and she now needs to arrive at 0600.   Karla states that she w

## 2018-09-06 NOTE — PROGRESS NOTES
Receive after visit letter from Dr. Keily Earl of [facility]. Dated 8/1/2018.

## 2018-09-06 NOTE — PAT NURSING NOTE
I spoke with Karla at [facility] regarding a change in the arrival time for the ASL tomorrow morning from 0800 to 0600. She states that she will call the interpretor to see if this is ok or not. Karla from [address] [place] [address] will call back with the confirmation.

## 2018-09-07 ENCOUNTER — HOSPITAL ENCOUNTER (OUTPATIENT)
Facility: HOSPITAL | Age: 43
Setting detail: HOSPITAL OUTPATIENT SURGERY
Discharge: HOME | End: 2018-09-07
Attending: INTERNAL MEDICINE | Admitting: INTERNAL MEDICINE
Payer: COMMERCIAL

## 2018-09-07 ENCOUNTER — PRIOR ORIGINAL RECORDS (OUTPATIENT)
Dept: OTHER | Age: 43
End: 2018-09-07

## 2018-09-07 VITALS
HEART RATE: 80 BPM | RESPIRATION RATE: 16 BRPM | OXYGEN SATURATION: 100 % | SYSTOLIC BLOOD PRESSURE: 107 MMHG | WEIGHT: 200 LBS | DIASTOLIC BLOOD PRESSURE: 73 MMHG | TEMPERATURE: 99 F | BODY MASS INDEX: 36.8 KG/M2 | HEIGHT: 62 IN

## 2018-09-07 DIAGNOSIS — R10.32 LLQ ABDOMINAL PAIN: ICD-10-CM

## 2018-09-07 LAB
ANION GAP SERPL CALC-SCNC: 8 MMOL/L (ref 0–18)
BUN BLD-MCNC: 6 MG/DL (ref 8–20)
BUN/CREAT SERPL: 9 (ref 10–20)
CALCIUM BLD-MCNC: 8.9 MG/DL (ref 8.3–10.3)
CHLORIDE SERPL-SCNC: 101 MMOL/L (ref 101–111)
CO2 SERPL-SCNC: 30 MMOL/L (ref 22–32)
CREAT BLD-MCNC: 0.67 MG/DL (ref 0.55–1.02)
GLUCOSE BLD-MCNC: 87 MG/DL (ref 70–99)
HAV IGM SER QL: 2.1 MG/DL (ref 1.8–2.5)
OSMOLALITY SERPL CALC.SUM OF ELEC: 285 MOSM/KG (ref 275–295)
POTASSIUM SERPL-SCNC: 3.4 MMOL/L (ref 3.6–5.1)
SODIUM SERPL-SCNC: 139 MMOL/L (ref 136–144)

## 2018-09-07 PROCEDURE — 0DJD8ZZ INSPECTION OF LOWER INTESTINAL TRACT, VIA NATURAL OR ARTIFICIAL OPENING ENDOSCOPIC: ICD-10-PCS | Performed by: INTERNAL MEDICINE

## 2018-09-07 PROCEDURE — 83735 ASSAY OF MAGNESIUM: CPT | Performed by: ANESTHESIOLOGY

## 2018-09-07 PROCEDURE — 80048 BASIC METABOLIC PNL TOTAL CA: CPT | Performed by: ANESTHESIOLOGY

## 2018-09-07 RX ORDER — SODIUM CHLORIDE, SODIUM LACTATE, POTASSIUM CHLORIDE, CALCIUM CHLORIDE 600; 310; 30; 20 MG/100ML; MG/100ML; MG/100ML; MG/100ML
INJECTION, SOLUTION INTRAVENOUS CONTINUOUS
Status: DISCONTINUED | OUTPATIENT
Start: 2018-09-07 | End: 2018-09-07

## 2018-09-07 RX ORDER — ONDANSETRON 2 MG/ML
4 INJECTION INTRAMUSCULAR; INTRAVENOUS AS NEEDED
Status: DISCONTINUED | OUTPATIENT
Start: 2018-09-07 | End: 2018-09-07

## 2018-09-07 RX ORDER — NALOXONE HYDROCHLORIDE 0.4 MG/ML
80 INJECTION, SOLUTION INTRAMUSCULAR; INTRAVENOUS; SUBCUTANEOUS AS NEEDED
Status: DISCONTINUED | OUTPATIENT
Start: 2018-09-07 | End: 2018-09-07

## 2018-09-07 RX ORDER — METOCLOPRAMIDE HYDROCHLORIDE 5 MG/ML
10 INJECTION INTRAMUSCULAR; INTRAVENOUS AS NEEDED
Status: DISCONTINUED | OUTPATIENT
Start: 2018-09-07 | End: 2018-09-07

## 2018-09-07 NOTE — H&P
History & Physical Examination    Patient Name: Candy Hardy  MRN: [id number]  [facility]: [id number]  YOB: 1975    Diagnosis: Left lower quadrant pain    Present Illness: 39 y/o F history as above presents for Colonoscopy      Prescriptions mL (1 each total) as directed one time.  Disp: 1 each Rfl: 0 Taking       Current Facility-Administered Medications:  lactated ringers infusion  Intravenous Continuous       Allergies:   Levaquin                PALPITATIONS  Strawberries            HIVES  P Comment: bunion  No date: OTHER SURGICAL HISTORY      Comment: knee left scope  not acl  09/15/09: OTHER SURGICAL HISTORY      Comment: colpo   No date: PACEMAKER/DEFIBRILLATOR  2002: REMOVAL OF OVARIAN CYST(S)  2002: TUBAL LIGATION  Family History   Probl

## 2018-09-07 NOTE — OPERATIVE REPORT
Kisha Aldana Patient Status:  Hospital Outpatient Surgery    1975 MRN [id number]   [facility] ENDOSCOPY Attending Maicol Eisenberg DO   Hosp Day # 0 PCP Angela Adair MD       PREOPERATIVE DIAGNOSIS/INDICATION: Left Lo colon: The mucosa and vascular pattern were normal.  Descending colon: The mucosa and vascular pattern were normal.  Sigmoid colon: The mucosa and vascular pattern were normal.  Rectum:  The mucosa and vascular pattern were normal. Retroflexion revealed sma

## 2018-09-07 NOTE — PROGRESS NOTES
Per Dr. Erwin Valeriano anesthesiologist KCl 40 meq IV is to be discontinued before discharge. Patient understands via .
2-4 times/mo

## 2018-09-19 ENCOUNTER — TELEPHONE (OUTPATIENT)
Dept: INTERNAL MEDICINE CLINIC | Facility: CLINIC | Age: 43
End: 2018-09-19

## 2018-09-19 DIAGNOSIS — R06.83 SNORING: Primary | ICD-10-CM

## 2018-09-19 NOTE — TELEPHONE ENCOUNTER
During anesthesia screening for pt's colonoscopy there was some concern that she may be at risk for sleep apnea.   Please check with pt if she snores, if she has daytime sleepiness or fatigue, does she feel she can fall asleep during the day at any time, ha

## 2018-09-19 NOTE — TELEPHONE ENCOUNTER
Pt responded to questions with below (Did send follow up email asking about the choking sensation again but sounds like she may have some concerns as it is): \"Yikes, that's a concern.  My Dad has sleep apnea and yes, I have been told that I snore when I

## 2018-09-19 NOTE — TELEPHONE ENCOUNTER
LM using interpretative services asking she call back but will also leave message on mychart. Dr Chiquita Carter questions copied to [facility] for pt to answer.

## 2018-09-27 ENCOUNTER — TELEPHONE (OUTPATIENT)
Dept: OBGYN CLINIC | Facility: CLINIC | Age: 43
End: 2018-09-27

## 2018-09-27 NOTE — TELEPHONE ENCOUNTER
[employer] is requesting medical records for date of service 8/31/18.     Sent to Scan 9/28/18

## 2018-10-01 ENCOUNTER — OFFICE VISIT (OUTPATIENT)
Dept: OBGYN CLINIC | Facility: CLINIC | Age: 43
End: 2018-10-01
Payer: COMMERCIAL

## 2018-10-01 VITALS
SYSTOLIC BLOOD PRESSURE: 124 MMHG | HEART RATE: 80 BPM | RESPIRATION RATE: 16 BRPM | BODY MASS INDEX: 36.44 KG/M2 | WEIGHT: 198 LBS | DIASTOLIC BLOOD PRESSURE: 86 MMHG | HEIGHT: 62 IN

## 2018-10-01 DIAGNOSIS — R10.2 PELVIC PAIN: ICD-10-CM

## 2018-10-01 DIAGNOSIS — N80.9 ENDOMETRIOSIS: Primary | ICD-10-CM

## 2018-10-01 PROCEDURE — 96372 THER/PROPH/DIAG INJ SC/IM: CPT | Performed by: NURSE PRACTITIONER

## 2018-10-01 PROCEDURE — 99213 OFFICE O/P EST LOW 20 MIN: CPT | Performed by: NURSE PRACTITIONER

## 2018-10-01 NOTE — PROGRESS NOTES
GYN H&P     10/1/2018  10:00 AM    CC: Patient is here for Lupron #5 injection    HPI: Patient is a 38year old  here. Provider would like to discuss option of pt changing treatment plan to [address] [place] [address] for endometriosis.  Pt will only have puffs into the lungs every 6 (six) hours as needed for Wheezing. Disp: 1 Inhaler Rfl: 0   Budesonide-Formoterol Fumarate (SYMBICORT) 160-4.5 MCG/ACT Inhalation Aerosol Inhale 1 puff into the lungs 2 (two) times daily.  Disp: 3 Inhaler Rfl: 0   BENADRYL 22 M (CHRONIC URTICARIA) INDEX  No date: EGD  No date: ORAL SURGERY PROCEDURE      Comment:  wisdom teeth  No date: OTHER SURGICAL HISTORY      Comment:  bunion  No date: OTHER SURGICAL HISTORY      Comment:  knee left scope  not acl  09/15/09: OTHER SURGICAL H Pack years: 1.6        Types: Cigarettes        Quit date: 3/28/2008        Years since quitting: 10.5      Smokeless tobacco: Former User    Substance and Sexual Activity      Alcohol use:  Yes        Alcohol/week: 0.0 - 0.6 oz        Comment: occassionall

## 2018-10-01 NOTE — PROGRESS NOTES
Here for consult and next Lupron injection. Lupron Depot given in left outer gluteal. Tolerated well.

## 2018-10-03 ENCOUNTER — TELEPHONE (OUTPATIENT)
Dept: INTERNAL MEDICINE CLINIC | Facility: CLINIC | Age: 43
End: 2018-10-03

## 2018-10-03 NOTE — TELEPHONE ENCOUNTER
Patient was recently at [facility] for a heart problem. Her heart had stopped and she fell to the ground, landing on her foot awkwardly. At that time, the doctor's at [address] [place] [address] were concentrating n her heart issue.   When she told them about h

## 2018-10-04 NOTE — TELEPHONE ENCOUNTER
Noted below and spoke with  #[id number].  Gave the  the message that the patient should come in to see either Dr. Kalin Peres or Jose Rafael President for an evaluation of her foot, and if she can bring a copy of the Xray report (not film) to her appointment

## 2018-10-09 ENCOUNTER — HOSPITAL ENCOUNTER (OUTPATIENT)
Dept: GENERAL RADIOLOGY | Age: 43
Discharge: HOME | End: 2018-10-09
Attending: NURSE PRACTITIONER
Payer: COMMERCIAL

## 2018-10-09 ENCOUNTER — OFFICE VISIT (OUTPATIENT)
Dept: INTERNAL MEDICINE CLINIC | Facility: CLINIC | Age: 43
End: 2018-10-09

## 2018-10-09 VITALS
DIASTOLIC BLOOD PRESSURE: 60 MMHG | RESPIRATION RATE: 14 BRPM | SYSTOLIC BLOOD PRESSURE: 94 MMHG | WEIGHT: 201.81 LBS | HEART RATE: 81 BPM | BODY MASS INDEX: 37.14 KG/M2 | HEIGHT: 62 IN | OXYGEN SATURATION: 97 % | TEMPERATURE: 98 F

## 2018-10-09 DIAGNOSIS — M25.572 ACUTE LEFT ANKLE PAIN: ICD-10-CM

## 2018-10-09 DIAGNOSIS — M25.572 ACUTE LEFT ANKLE PAIN: Primary | ICD-10-CM

## 2018-10-09 DIAGNOSIS — J45.30 MILD PERSISTENT ASTHMA WITHOUT COMPLICATION: Chronic | ICD-10-CM

## 2018-10-09 PROCEDURE — 73610 X-RAY EXAM OF ANKLE: CPT | Performed by: NURSE PRACTITIONER

## 2018-10-09 PROCEDURE — 99213 OFFICE O/P EST LOW 20 MIN: CPT | Performed by: NURSE PRACTITIONER

## 2018-10-09 RX ORDER — MECLIZINE HYDROCHLORIDE 25 MG/1
25 TABLET ORAL 3 TIMES DAILY PRN
Qty: 90 TABLET | Refills: 0 | Status: SHIPPED | OUTPATIENT
Start: 2018-10-09 | End: 2021-12-30

## 2018-10-09 RX ORDER — BUDESONIDE AND FORMOTEROL FUMARATE DIHYDRATE 160; 4.5 UG/1; UG/1
1 AEROSOL RESPIRATORY (INHALATION) 2 TIMES DAILY
Qty: 3 INHALER | Refills: 0 | Status: SHIPPED | OUTPATIENT
Start: 2018-10-09 | End: 2019-02-06

## 2018-10-09 RX ORDER — EPINEPHRINE 0.3 MG/.3ML
0.3 INJECTION SUBCUTANEOUS ONCE
Qty: 1 EACH | Refills: 0 | Status: SHIPPED | OUTPATIENT
Start: 2018-10-09 | End: 2018-10-09

## 2018-10-09 NOTE — PROGRESS NOTES
Patient presents with: Ankle Injury: swelling, it is purple, since Aug 5, leg swelling      HPI:  Presents with approx 2 month history of intermittent left ankle pain.  Stated had cardiac event with syncopal episode on Aug 5th and fell on ankle in [facility] Visual impairment     glasses   • Weight loss        Patient Active Problem List:     Long QT syndrome     Seizure disorder (HCC)     Asthma     S/P tubal ligation     Seasonal allergic rhinitis, unspecified allergic rhinitis trigger     Cyst of left ovary Left arm, Patient Position: Sitting, Cuff Size: large)   Pulse 81   Temp 98.1 °F (36.7 °C) (Oral)   Resp 14   Ht 62\"   Wt 201 lb 12.8 oz   LMP 07/21/2018 (Exact Date)   SpO2 97%   BMI 36.91 kg/m²   Constitutional: well developed, well nourished,in no appa visit. All questions were answered and the patient understands the plan.

## 2018-10-15 ENCOUNTER — OFFICE VISIT (OUTPATIENT)
Dept: SLEEP CENTER | Facility: HOSPITAL | Age: 43
End: 2018-10-15
Attending: INTERNAL MEDICINE
Payer: COMMERCIAL

## 2018-10-15 DIAGNOSIS — R06.83 SNORING: ICD-10-CM

## 2018-10-15 PROCEDURE — 95810 POLYSOM 6/> YRS 4/> PARAM: CPT

## 2018-10-17 NOTE — PROCEDURES
[address] [address]       Accredited by the [facility] of Sleep Medicine ([facility])    PATIENT'S NAME:        Ben Otto  ATTENDING PHYSICIAN:   Hemal Guzmán M.D. REFERRING PHYSICIAN:   Westley Choe M.D.   P The oxygen david is 91%. The patient spent the entire record with a saturation above 90%. PERIODIC LIMB MOVEMENTS:  PLM index is 17. PLM arousal index is 15. EEG:  With the limited recording montage, no EEG abnormalities were detected. EKG:   Lenda Alejandro

## 2018-10-19 RX ORDER — EPINEPHRINE 0.3 MG/.3ML
0.3 INJECTION SUBCUTANEOUS ONCE
Qty: 1 EACH | Refills: 0 | OUTPATIENT
Start: 2018-10-19 | End: 2018-10-19

## 2018-10-24 ENCOUNTER — TELEPHONE (OUTPATIENT)
Dept: INTERNAL MEDICINE CLINIC | Facility: CLINIC | Age: 43
End: 2018-10-24

## 2018-10-24 ENCOUNTER — OFFICE VISIT (OUTPATIENT)
Dept: OBGYN CLINIC | Facility: CLINIC | Age: 43
End: 2018-10-24
Payer: COMMERCIAL

## 2018-10-24 VITALS
TEMPERATURE: 98 F | WEIGHT: 208 LBS | RESPIRATION RATE: 18 BRPM | SYSTOLIC BLOOD PRESSURE: 120 MMHG | HEART RATE: 80 BPM | BODY MASS INDEX: 38.28 KG/M2 | DIASTOLIC BLOOD PRESSURE: 70 MMHG | HEIGHT: 62 IN

## 2018-10-24 DIAGNOSIS — N80.9 ENDOMETRIOSIS: Primary | ICD-10-CM

## 2018-10-24 DIAGNOSIS — R10.2 PELVIC PAIN: ICD-10-CM

## 2018-10-24 DIAGNOSIS — R60.0 BILATERAL LOWER EXTREMITY EDEMA: ICD-10-CM

## 2018-10-24 PROCEDURE — 99213 OFFICE O/P EST LOW 20 MIN: CPT | Performed by: NURSE PRACTITIONER

## 2018-10-24 NOTE — TELEPHONE ENCOUNTER
Patient called and would like Dr. Charly Reyez to write a referral for an Paul Hernandez specialist for her sleep study follow up. Patient is not able to see any [facility] providers. Sleep study has already been completed, she just needs to go for a follow up for results.  Perla

## 2018-10-24 NOTE — PROGRESS NOTES
GYN H&P     10/24/2018  1:37 PM    CC: Patient is here to discuss new acute pain    HPI: Patient is a 38year old  here. Reports about 2 days ago she started having pain in her lower left groin/abd.  Pt feels confident the pain is related to her endo Chew Tab CHEW AND SWALLOW 1 TABLET BY MOUTH DAILY Disp: 30 tablet Rfl: 11   TraMADol HCl 50 MG Oral Tab Take 1 tablet (50 mg total) by mouth every 6 (six) hours as needed for Pain.  Disp: 40 tablet Rfl: 0   Spacer/Aero Chamber Mouthpiece Does not apply Misc HISTORY  09/15/09    colpo    • PACEMAKER/DEFIBRILLATOR     • REMOVAL OF OVARIAN CYST(S)  2002   • TUBAL LIGATION  2002       Levaquin                PALPITATIONS  Strawberries            HIVES  Percocet [Oxycodone*    ITCHING, NAUSEA AND VOMITING  Codeine Occupational History      Not on file    Tobacco Use      Smoking status: Former Smoker        Packs/day: 0.80        Years: 2.00        Pack years: 1.6        Types: Cigarettes        Quit date: 3/28/2008        Years since quitting: 10.5      Smokeless t Pt did have an injury to her left ankle recently which accounts for some of her swelling, but needs evaluation for this edema. Told due to her cardiac issues her cardiologist is the best provider to determine if a diuretic is needed.  Pt marco a.       Hung[place]

## 2018-10-24 NOTE — TELEPHONE ENCOUNTER
Pt cannot go to [facility] due to collections. Needs a pulm outside of [facility], please advise. Pt wants response via [employer].

## 2018-10-26 ENCOUNTER — TELEPHONE (OUTPATIENT)
Dept: OBGYN CLINIC | Facility: CLINIC | Age: 43
End: 2018-10-26

## 2018-10-26 NOTE — TELEPHONE ENCOUNTER
PC with patient through . The [employer] is requiring prior approval which we will submit. It may take a few days to hear anything. She asked if she should schedule another Lupron injection?  Told wait until next week to see what insurance

## 2018-10-26 NOTE — TELEPHONE ENCOUNTER
There is a pulmonary and sleep center at [facility]. She can see dr. Poonam Rolon or another one of their specialists. The contact info I could see is:    [address]  [address] [address]  Phone: [phone]: [phone]

## 2018-10-26 NOTE — TELEPHONE ENCOUNTER
Pt. Stated that her insurance is giving her difficulty filling her script for Juan High and would like staff to [facility] to explain the necessity. I believe she is hearing impaired and is having difficulty explaining to pharmacy.

## 2018-10-29 ENCOUNTER — TELEPHONE (OUTPATIENT)
Dept: OBGYN CLINIC | Facility: CLINIC | Age: 43
End: 2018-10-29

## 2018-10-29 ENCOUNTER — TELEPHONE (OUTPATIENT)
Dept: NEUROLOGY | Facility: CLINIC | Age: 43
End: 2018-10-29

## 2018-10-29 NOTE — TELEPHONE ENCOUNTER
Received fax from [facility] regarding prior approval for Orilissa. The request was denied. Spoke with Michaela Lezama with [facility] and she helped me filled out a request for medication assistance. Request submitted.

## 2018-10-30 ENCOUNTER — PRIOR ORIGINAL RECORDS (OUTPATIENT)
Dept: OTHER | Age: 43
End: 2018-10-30

## 2018-10-31 ENCOUNTER — MYAURORA ACCOUNT LINK (OUTPATIENT)
Dept: OTHER | Age: 43
End: 2018-10-31

## 2018-10-31 ENCOUNTER — PRIOR ORIGINAL RECORDS (OUTPATIENT)
Dept: OTHER | Age: 43
End: 2018-10-31

## 2018-10-31 ENCOUNTER — HOSPITAL (OUTPATIENT)
Dept: OTHER | Age: 43
End: 2018-10-31
Attending: INTERNAL MEDICINE

## 2018-10-31 ENCOUNTER — TELEPHONE (OUTPATIENT)
Dept: NEUROLOGY | Facility: CLINIC | Age: 43
End: 2018-10-31

## 2018-10-31 DIAGNOSIS — G40.909 SEIZURE DISORDER (HCC): Primary | ICD-10-CM

## 2018-10-31 LAB
ANION GAP SERPL CALC-SCNC: 10 MMOL/L (ref 10–20)
BUN SERPL-MCNC: 15 MG/DL (ref 6–20)
BUN/CREAT SERPL: 20 (ref 7–25)
CALCIUM SERPL-MCNC: 8.4 MG/DL (ref 8.4–10.2)
CHLORIDE: 104 MMOL/L (ref 98–107)
CO2 SERPL-SCNC: 28 MMOL/L (ref 21–32)
CREAT SERPL-MCNC: 0.76 MG/DL (ref 0.51–0.95)
GLUCOSE SERPL-MCNC: 87 MG/DL (ref 65–99)
LENGTH OF FAST TIME PATIENT: NORMAL HR
MAGNESIUM SERPL-MCNC: 2.1 MG/DL (ref 1.7–2.4)
NT-PROBNP SERPL-MCNC: 148 PG/ML
PHENYTOIN SERPL-MCNC: 35.5 MCG/ML (ref 10–20)
POTASSIUM SERPL-SCNC: 3.7 MMOL/L (ref 3.4–5.1)
SODIUM SERPL-SCNC: 138 MMOL/L (ref 135–145)

## 2018-10-31 NOTE — TELEPHONE ENCOUNTER
Spoke with Radha Hernandes from [address] [place] [address] Cardiology, Dr. Rod Engel office stating that Dilantin level is critical at 35.5  reference levels are 10-20  Copy of lab results were put on Meka Esqueda's desk      On 8/28/18 Dilantin levels were elevated at 25.6  Pt was adv

## 2018-11-01 ENCOUNTER — TELEPHONE (OUTPATIENT)
Dept: OBGYN CLINIC | Facility: CLINIC | Age: 43
End: 2018-11-01

## 2018-11-01 LAB
BUN: 15 MG/DL
CALCIUM: 8.4 MG/DL
CHLORIDE: 104 MEQ/L
CREATININE, SERUM: 0.76 MG/DL
MAGNESIUM: 2.1 MG/DL
POTASSIUM, SERUM: 3.7 MEQ/L
PROBNP: 148 PG/ML
SODIUM: 138 MEQ/L

## 2018-11-01 NOTE — TELEPHONE ENCOUNTER
Received call from patient through . Asking about the medication Franca Real and being too expensive. Told her that we are trying to get assistance for the medication but may take awhile.  She will need to get another Lupron injection while

## 2018-11-01 NOTE — TELEPHONE ENCOUNTER
Left voice-mail message for patient to call back regarding her dilantin level. Discussed with Dr. Galina Triana and will have her reduce her dose to from 450mg daily to 400mg daily and repeat a level in 10 days.

## 2018-11-01 NOTE — TELEPHONE ENCOUNTER
Per Epic review patient prefers to use written communication. ITYZ message sent with result and recommendation to decrease dosage. Epic medication list will be updated upon patient acknowledgement of message.

## 2018-11-05 ENCOUNTER — HOSPITAL ENCOUNTER (OUTPATIENT)
Dept: PHYSICAL THERAPY | Facility: HOSPITAL | Age: 43
Setting detail: THERAPIES SERIES
Discharge: HOME | End: 2018-11-05
Attending: INTERNAL MEDICINE
Payer: COMMERCIAL

## 2018-11-05 DIAGNOSIS — M25.572 ACUTE LEFT ANKLE PAIN: ICD-10-CM

## 2018-11-05 PROCEDURE — 97110 THERAPEUTIC EXERCISES: CPT

## 2018-11-05 PROCEDURE — 97162 PT EVAL MOD COMPLEX 30 MIN: CPT

## 2018-11-05 NOTE — PROGRESS NOTES
Physical Therapy  EVALUATION:   Referring Physician: Dr. Nyra Leventhal  Diagnosis: acute L ankle pain Date of Service: 11/5/2018     PATIENT SUMMARY   Anita Andrade is a 38year old y/o female who presents to therapy today with complaints of L ankle pain. unremarkable   Palpation: tender to palpation of medial aspect of achilles tendon and musculotendonous junction L. LE edema proximal to L ankle    Strength: DF and eversion 5/5. PF 4-/5. Inversion 4/5. L  Other LE MMT 5/5    Flexibility: [place]    Special test care.    X___________________________________________________ Date____________________    Certification From: 92/2/8795  To:2/3/2019

## 2018-11-06 ENCOUNTER — TELEPHONE (OUTPATIENT)
Dept: OBGYN CLINIC | Facility: CLINIC | Age: 43
End: 2018-11-06

## 2018-11-06 RX ORDER — TRAMADOL HYDROCHLORIDE 50 MG/1
50 TABLET ORAL EVERY 6 HOURS PRN
Qty: 40 TABLET | Refills: 0 | Status: SHIPPED | OUTPATIENT
Start: 2018-11-06 | End: 2020-02-17

## 2018-11-06 NOTE — TELEPHONE ENCOUNTER
Pt c/o alot of pain today. Please advise. Also wanting to know when she can anticipate next injection?

## 2018-11-06 NOTE — TELEPHONE ENCOUNTER
Pt provided information on taking her Tramadol prescription (will provide refill today) PRN for the pain while [facility], LPN is working on getting Lupron injection or Gerda prescription approved.  Pt marco a.  Will call pt once we know when Lupron will be availab

## 2018-11-07 NOTE — TELEPHONE ENCOUNTER
PC with patient through the . The Lupron depot injection is here and she can schedule the appointment. Scheduled for 11/9/2018.

## 2018-11-08 ENCOUNTER — HOSPITAL ENCOUNTER (OUTPATIENT)
Dept: PHYSICAL THERAPY | Facility: HOSPITAL | Age: 43
Setting detail: THERAPIES SERIES
Discharge: HOME | End: 2018-11-08
Attending: INTERNAL MEDICINE
Payer: COMMERCIAL

## 2018-11-08 PROCEDURE — 97110 THERAPEUTIC EXERCISES: CPT

## 2018-11-08 NOTE — PROGRESS NOTES
Dx: L ankle pain      Authorized # of Visits:     Next MD visit: none scheduled  Fall Risk: standard         Precautions: n/a             Subjective: no inc pain w/ ex. Both legs swollen today    Objective: Swelling evident from ankle superiorly B.  No sig

## 2018-11-09 ENCOUNTER — OFFICE VISIT (OUTPATIENT)
Dept: OBGYN CLINIC | Facility: CLINIC | Age: 43
End: 2018-11-09
Payer: COMMERCIAL

## 2018-11-09 DIAGNOSIS — R10.2 PELVIC PAIN: ICD-10-CM

## 2018-11-09 DIAGNOSIS — N80.9 ENDOMETRIOSIS: Primary | ICD-10-CM

## 2018-11-09 PROCEDURE — 96372 THER/PROPH/DIAG INJ SC/IM: CPT | Performed by: NURSE PRACTITIONER

## 2018-11-09 NOTE — PROGRESS NOTES
Here for Lupron depot injection. Given in right outer gluteal. Tolerated well. Aware to return with in 2 weeks to discuss Orilissa.

## 2018-11-09 NOTE — PROGRESS NOTES
Nora Momin is a 38year old female. HPI:   Patient presents with: Other: Lupron injection    Unable to get  for consult visit- so pt asked to come in for her Lupron Inj and then f/u for [facility] consultation visit.       [address] [place] [address] Comment:Abdominal interlance  Qvar [Beclomethason*    OTHER (SEE COMMENTS)    Comment:headaches  Tobramycin              OTHER (SEE COMMENTS)    Comment:Headaches.  Contraindicated with heart  Tomatoes                        ASSESSMENT/PLAN:   Assessme

## 2018-11-12 ENCOUNTER — PATIENT MESSAGE (OUTPATIENT)
Dept: INTERNAL MEDICINE CLINIC | Facility: CLINIC | Age: 43
End: 2018-11-12

## 2018-11-12 ENCOUNTER — HOSPITAL ENCOUNTER (OUTPATIENT)
Dept: PHYSICAL THERAPY | Facility: HOSPITAL | Age: 43
Setting detail: THERAPIES SERIES
Discharge: HOME | End: 2018-11-12
Attending: NURSE PRACTITIONER
Payer: COMMERCIAL

## 2018-11-12 PROCEDURE — 97110 THERAPEUTIC EXERCISES: CPT

## 2018-11-12 PROCEDURE — 97530 THERAPEUTIC ACTIVITIES: CPT

## 2018-11-12 NOTE — PROGRESS NOTES
Dx: L ankle pain      Authorized # of Visits:     Next MD visit: none scheduled  Fall Risk: standard         Precautions: n/a             Subjective: tape helpful, ankle feeling alittle better.  Swelling persists  Objective: Swelling evident from ankle supe

## 2018-11-13 NOTE — TELEPHONE ENCOUNTER
From: Alba Sanchez  To: Fabby Mcneal MD  Sent: 11/12/2018 12:20 PM CST  Subject: Non-Urgent Medical Question    Hello,     I just realized that I'm double-booked on the 19th.  Can you please let me know when I can see Dr. Maverick Gerardo for follow up either

## 2018-11-14 ENCOUNTER — PRIOR ORIGINAL RECORDS (OUTPATIENT)
Dept: OTHER | Age: 43
End: 2018-11-14

## 2018-11-15 ENCOUNTER — HOSPITAL ENCOUNTER (OUTPATIENT)
Dept: PHYSICAL THERAPY | Facility: HOSPITAL | Age: 43
Setting detail: THERAPIES SERIES
Discharge: HOME | End: 2018-11-15
Attending: NURSE PRACTITIONER
Payer: COMMERCIAL

## 2018-11-15 PROCEDURE — 97530 THERAPEUTIC ACTIVITIES: CPT

## 2018-11-15 PROCEDURE — 97016 VASOPNEUMATIC DEVICE THERAPY: CPT

## 2018-11-15 PROCEDURE — 97110 THERAPEUTIC EXERCISES: CPT

## 2018-11-15 NOTE — PROGRESS NOTES
Dx: L ankle pain      Authorized # of Visits:     Next MD visit: none scheduled  Fall Risk: standard         Precautions: n/a             Subjective: Increase swelling B and L ankle painful from pressure.  Notes improved ROM since starting PT but pain octavio

## 2018-11-17 DIAGNOSIS — J45.20 MILD INTERMITTENT ASTHMA WITHOUT COMPLICATION: ICD-10-CM

## 2018-11-19 ENCOUNTER — HOSPITAL ENCOUNTER (OUTPATIENT)
Dept: PHYSICAL THERAPY | Facility: HOSPITAL | Age: 43
Setting detail: THERAPIES SERIES
Discharge: HOME | End: 2018-11-19
Attending: NURSE PRACTITIONER
Payer: COMMERCIAL

## 2018-11-19 ENCOUNTER — MYAURORA ACCOUNT LINK (OUTPATIENT)
Dept: OTHER | Age: 43
End: 2018-11-19

## 2018-11-19 ENCOUNTER — PRIOR ORIGINAL RECORDS (OUTPATIENT)
Dept: OTHER | Age: 43
End: 2018-11-19

## 2018-11-19 PROCEDURE — 97110 THERAPEUTIC EXERCISES: CPT

## 2018-11-19 RX ORDER — MONTELUKAST SODIUM 10 MG/1
TABLET ORAL
Qty: 30 TABLET | Refills: 0 | Status: SHIPPED | OUTPATIENT
Start: 2018-11-19 | End: 2018-12-26

## 2018-11-19 NOTE — PROGRESS NOTES
Dx: L ankle pain      Authorized # of Visits:     Next MD visit: none scheduled  Fall Risk: standard         Precautions: n/a             Subjective: ankle sore from W/E activity.  Cold and compression from last session helpful  Objective: present

## 2018-11-20 LAB
BUN: 6 MG/DL
CALCIUM: 8.9 MG/DL
CHLORIDE: 101 MEQ/L
CREATININE, SERUM: 0.67 MG/DL
GLUCOSE: 87 MG/DL
POTASSIUM, SERUM: 3.4 MEQ/L
SODIUM: 139 MEQ/L

## 2018-11-23 ENCOUNTER — LAB ENCOUNTER (OUTPATIENT)
Dept: LAB | Facility: HOSPITAL | Age: 43
End: 2018-11-23
Attending: PHYSICIAN ASSISTANT
Payer: COMMERCIAL

## 2018-11-23 ENCOUNTER — APPOINTMENT (OUTPATIENT)
Dept: LAB | Age: 43
End: 2018-11-23
Attending: NURSE PRACTITIONER
Payer: COMMERCIAL

## 2018-11-23 ENCOUNTER — PRIOR ORIGINAL RECORDS (OUTPATIENT)
Dept: OTHER | Age: 43
End: 2018-11-23

## 2018-11-23 DIAGNOSIS — I49.01 VENTRICULAR FIBRILLATION (HCC): Primary | ICD-10-CM

## 2018-11-23 PROCEDURE — 80048 BASIC METABOLIC PNL TOTAL CA: CPT

## 2018-11-23 PROCEDURE — 36415 COLL VENOUS BLD VENIPUNCTURE: CPT

## 2018-11-23 PROCEDURE — 83880 ASSAY OF NATRIURETIC PEPTIDE: CPT

## 2018-11-26 ENCOUNTER — APPOINTMENT (OUTPATIENT)
Dept: PHYSICAL THERAPY | Facility: HOSPITAL | Age: 43
End: 2018-11-26
Attending: NURSE PRACTITIONER
Payer: COMMERCIAL

## 2018-11-27 ENCOUNTER — TELEPHONE (OUTPATIENT)
Dept: OBGYN CLINIC | Facility: CLINIC | Age: 43
End: 2018-11-27

## 2018-11-27 ENCOUNTER — PATIENT MESSAGE (OUTPATIENT)
Dept: INTERNAL MEDICINE CLINIC | Facility: CLINIC | Age: 43
End: 2018-11-27

## 2018-11-27 NOTE — TELEPHONE ENCOUNTER
Pt called through an  service questioning her new medication Orilissa. She wanted to know if medication had been approved through the . Pt was advised RX had been approved and the medication was sent to her [address]   Pt

## 2018-11-28 NOTE — TELEPHONE ENCOUNTER
From: Kisha Aldana  To: Ivory Shen MD  Sent: 11/27/2018 5:05 AM CST  Subject: Test Results Question    I have a question about PRO BETA NATRIURETIC PEPTIDE resulted on 11/23/18 at 1:17 PM.    What does the result mean?     Thank you,     Lula Cutler

## 2018-11-29 LAB
BUN: 12 MG/DL
CALCIUM: 8.5 MG/DL
CHLORIDE: 103 MEQ/L
CREATININE, SERUM: 0.71 MG/DL
GLUCOSE: 111 MG/DL
POTASSIUM, SERUM: 4.2 MEQ/L
PROBNP: 81 PG/ML
SODIUM: 139 MEQ/L

## 2018-11-30 ENCOUNTER — PRIOR ORIGINAL RECORDS (OUTPATIENT)
Dept: OTHER | Age: 43
End: 2018-11-30

## 2018-12-03 ENCOUNTER — HOSPITAL ENCOUNTER (OUTPATIENT)
Dept: PHYSICAL THERAPY | Facility: HOSPITAL | Age: 43
Setting detail: THERAPIES SERIES
Discharge: HOME | End: 2018-12-03
Attending: NURSE PRACTITIONER
Payer: COMMERCIAL

## 2018-12-03 PROCEDURE — 97016 VASOPNEUMATIC DEVICE THERAPY: CPT

## 2018-12-03 PROCEDURE — 97110 THERAPEUTIC EXERCISES: CPT

## 2018-12-03 NOTE — PROGRESS NOTES
Dx: L ankle pain      Authorized # of Visits:     Next MD visit: none scheduled  Fall Risk: standard         Precautions: n/a             Subjective: describes continued issues with swelling at ankle. Reports 2 Saturdays ago, about 3 times the size.  Feels

## 2018-12-04 ENCOUNTER — PRIOR ORIGINAL RECORDS (OUTPATIENT)
Dept: OTHER | Age: 43
End: 2018-12-04

## 2018-12-04 ENCOUNTER — MYAURORA ACCOUNT LINK (OUTPATIENT)
Dept: OTHER | Age: 43
End: 2018-12-04

## 2018-12-05 ENCOUNTER — MED REC SCAN ONLY (OUTPATIENT)
Dept: INTERNAL MEDICINE CLINIC | Facility: CLINIC | Age: 43
End: 2018-12-05

## 2018-12-07 ENCOUNTER — OFFICE VISIT (OUTPATIENT)
Dept: INTERNAL MEDICINE CLINIC | Facility: CLINIC | Age: 43
End: 2018-12-07
Payer: COMMERCIAL

## 2018-12-07 ENCOUNTER — TELEPHONE (OUTPATIENT)
Dept: OBGYN CLINIC | Facility: CLINIC | Age: 43
End: 2018-12-07

## 2018-12-07 VITALS
HEIGHT: 62 IN | RESPIRATION RATE: 14 BRPM | WEIGHT: 207.81 LBS | OXYGEN SATURATION: 96 % | SYSTOLIC BLOOD PRESSURE: 126 MMHG | HEART RATE: 80 BPM | DIASTOLIC BLOOD PRESSURE: 72 MMHG | TEMPERATURE: 98 F | BODY MASS INDEX: 38.24 KG/M2

## 2018-12-07 DIAGNOSIS — R60.0 LEG EDEMA, LEFT: ICD-10-CM

## 2018-12-07 DIAGNOSIS — R22.42 LUMP OF LEFT THIGH: Primary | ICD-10-CM

## 2018-12-07 PROCEDURE — 99213 OFFICE O/P EST LOW 20 MIN: CPT | Performed by: INTERNAL MEDICINE

## 2018-12-07 RX ORDER — ELAGOLIX 150 MG/1
TABLET, FILM COATED ORAL
Refills: 5 | COMMUNITY
Start: 2018-11-29 | End: 2019-03-21 | Stop reason: DRUGHIGH

## 2018-12-07 RX ORDER — EPINEPHRINE 0.3 MG/.3ML
INJECTION SUBCUTANEOUS
Refills: 0 | COMMUNITY
Start: 2018-12-04

## 2018-12-07 RX ORDER — FUROSEMIDE 20 MG/1
TABLET ORAL
Refills: 6 | COMMUNITY
Start: 2018-11-27 | End: 2019-02-28 | Stop reason: DRUGHIGH

## 2018-12-07 NOTE — PROGRESS NOTES
[facility]    CHIEF COMPLAINT:  Patient presents with:  Swelling: swelling in left leg from ankle to groin area, saw brittany a bit ago, the ankle has gotten better but now sh is finding soem lumps around the thigh region        HISTORY OF PRESENT I Oral Tab TK 1 T PO BID Disp:  Rfl: 3   Potassium Chloride ER 20 MEQ Oral Tab CR TK 3 TS PO D Disp:  Rfl: 3   PHENYTOIN INFATABS 50 MG Oral Chew Tab CHEW AND SWALLOW 1 TABLET BY MOUTH DAILY Disp: 30 tablet Rfl: 11   Spacer/Aero Chamber Mouthpiece Does not a NATRIURETIC PEPTIDE   Result Value Ref Range    Pro-Beta Natriuretic Peptide 81 <125 pg/mL            ASSESSMENT AND PLAN:  1. Lump of left thigh  Likely lipoma. Will do ultrasound to confirm.   - US LEG LEFT COMPLETE (WQD=[id number]); Future    2.  Leg edema,

## 2018-12-10 ENCOUNTER — TELEPHONE (OUTPATIENT)
Dept: NEUROLOGY | Facility: CLINIC | Age: 43
End: 2018-12-10

## 2018-12-12 NOTE — TELEPHONE ENCOUNTER
Pt received missing test results letter. Asked if she needs a new order. Explained, as long as she goes to an [facility] they are in the system dated 11/1/18 and are valid for one year.

## 2018-12-21 DIAGNOSIS — J45.20 MILD INTERMITTENT ASTHMA WITHOUT COMPLICATION: ICD-10-CM

## 2018-12-24 NOTE — TELEPHONE ENCOUNTER
Last OV relevant to medication: 10/9/18 mult issues (pt has been seen since then 12/7/18 for leg edema)  Last refill date: 11/19/18     #/refills: 30/0  When pt was asked to return for OV: none  Upcoming appt/reason: 1/14/19 med check

## 2018-12-26 ENCOUNTER — PATIENT MESSAGE (OUTPATIENT)
Dept: INTERNAL MEDICINE CLINIC | Facility: CLINIC | Age: 43
End: 2018-12-26

## 2018-12-26 RX ORDER — MONTELUKAST SODIUM 10 MG/1
TABLET ORAL
Qty: 90 TABLET | Refills: 0 | Status: SHIPPED | OUTPATIENT
Start: 2018-12-26 | End: 2019-03-01

## 2018-12-27 ENCOUNTER — E-VISIT (OUTPATIENT)
Dept: FAMILY MEDICINE CLINIC | Facility: CLINIC | Age: 43
End: 2018-12-27

## 2018-12-27 ENCOUNTER — TELEPHONE (OUTPATIENT)
Dept: OBGYN CLINIC | Facility: CLINIC | Age: 43
End: 2018-12-27

## 2018-12-27 DIAGNOSIS — Z02.9 ENCOUNTERS FOR ADMINISTRATIVE PURPOSES: Primary | ICD-10-CM

## 2018-12-27 PROCEDURE — 98969 ONLINE SERVICE BY HC PRO: CPT | Performed by: PHYSICIAN ASSISTANT

## 2018-12-27 NOTE — TELEPHONE ENCOUNTER
Patient called to vincent message. Patient cannot be seen through [facility]. Patient was referred to [facility] but they are part of [facility].  Patient would like a recommendation for a Dr that she can see through [facility]. Patient said we can send her a dahliahart message with

## 2018-12-27 NOTE — TELEPHONE ENCOUNTER
From: Barry Alfaro  To: Barron Bruner MD  Sent: 12/26/2018 10:02 AM CST  Subject: Visit Follow-up Question    Hi Dr. Yohan Montero,     Can you please set me up with a follow-up for the sleep study with a physician at [facility] instead of [facility]?

## 2018-12-27 NOTE — TELEPHONE ENCOUNTER
[address] [place] [address] is requesting medical records from date of service of 11/9/18. Printed date of service requested.      Faxed to  [address] [facility] [place], [facility] [facility]    Fax: [phone]

## 2018-12-28 ENCOUNTER — HOSPITAL ENCOUNTER (EMERGENCY)
Facility: HOSPITAL | Age: 43
Discharge: HOME | End: 2018-12-28
Attending: STUDENT IN AN ORGANIZED HEALTH CARE EDUCATION/TRAINING PROGRAM
Payer: COMMERCIAL

## 2018-12-28 ENCOUNTER — APPOINTMENT (OUTPATIENT)
Dept: GENERAL RADIOLOGY | Facility: HOSPITAL | Age: 43
End: 2018-12-28
Attending: STUDENT IN AN ORGANIZED HEALTH CARE EDUCATION/TRAINING PROGRAM
Payer: COMMERCIAL

## 2018-12-28 VITALS
WEIGHT: 195 LBS | HEIGHT: 62 IN | TEMPERATURE: 98 F | BODY MASS INDEX: 35.88 KG/M2 | HEART RATE: 88 BPM | SYSTOLIC BLOOD PRESSURE: 120 MMHG | OXYGEN SATURATION: 96 % | RESPIRATION RATE: 18 BRPM | DIASTOLIC BLOOD PRESSURE: 78 MMHG

## 2018-12-28 DIAGNOSIS — J20.9 ACUTE BRONCHITIS, UNSPECIFIED ORGANISM: Primary | ICD-10-CM

## 2018-12-28 PROCEDURE — 71046 X-RAY EXAM CHEST 2 VIEWS: CPT | Performed by: STUDENT IN AN ORGANIZED HEALTH CARE EDUCATION/TRAINING PROGRAM

## 2018-12-28 PROCEDURE — 94640 AIRWAY INHALATION TREATMENT: CPT

## 2018-12-28 PROCEDURE — 99284 EMERGENCY DEPT VISIT MOD MDM: CPT

## 2018-12-28 RX ORDER — PREDNISONE 20 MG/1
60 TABLET ORAL ONCE
Status: COMPLETED | OUTPATIENT
Start: 2018-12-28 | End: 2018-12-28

## 2018-12-28 RX ORDER — PREDNISONE 20 MG/1
60 TABLET ORAL DAILY
Qty: 15 TABLET | Refills: 0 | Status: SHIPPED | OUTPATIENT
Start: 2018-12-28 | End: 2019-01-02

## 2018-12-28 RX ORDER — ALBUTEROL SULFATE 90 UG/1
2 AEROSOL, METERED RESPIRATORY (INHALATION) EVERY 4 HOURS PRN
Qty: 1 INHALER | Refills: 0 | Status: SHIPPED | OUTPATIENT
Start: 2018-12-28 | End: 2019-01-27

## 2018-12-28 RX ORDER — IPRATROPIUM BROMIDE AND ALBUTEROL SULFATE 2.5; .5 MG/3ML; MG/3ML
3 SOLUTION RESPIRATORY (INHALATION) ONCE
Status: COMPLETED | OUTPATIENT
Start: 2018-12-28 | End: 2018-12-28

## 2018-12-28 RX ORDER — BENZONATATE 100 MG/1
100 CAPSULE ORAL 3 TIMES DAILY PRN
Qty: 30 CAPSULE | Refills: 0 | Status: SHIPPED | OUTPATIENT
Start: 2018-12-28 | End: 2019-01-08

## 2018-12-31 NOTE — PROGRESS NOTES
Pt requesting sleep medicine dr through [facility] that is NOT affiliated with [facility]. Please advise. Thanks! Referral placed 9/19/18 for edward.

## 2019-01-02 ENCOUNTER — TELEPHONE (OUTPATIENT)
Dept: INTERNAL MEDICINE CLINIC | Facility: CLINIC | Age: 44
End: 2019-01-02

## 2019-01-02 NOTE — TELEPHONE ENCOUNTER
MARLEEN - Ivette message sent to patient regarding the change of appointment to [facility] on January 14th. Advised patient another appointment will need to be made for the medication check. Thanks!

## 2019-01-02 NOTE — TELEPHONE ENCOUNTER
Cannot do both for her as visits take time especially with . We should do ER follow up as that is more urgent. We can have her come back for the med check later.

## 2019-01-02 NOTE — TELEPHONE ENCOUNTER
Patient was seen in THE [facility] [place] [facility] ER Friday December 28th for acute bronchitis. Patient was told to follow up with primary dr within a week. Please contact patient to schedule appointment via [employer].  Patient would like to make sure she is able to reply to the me

## 2019-01-02 NOTE — TELEPHONE ENCOUNTER
Sorry, yes that appt is okay for ER follow up, she would need to come in later for med check, unless she can get in sooner for ER f/u

## 2019-01-02 NOTE — TELEPHONE ENCOUNTER
Patient already has an appt for medication check on 1/14/19, this is a 40 min appt and will include an . Patient is now calling because se was seen in ED 12/28 for acute bronchitis.  Patient is wondering if she may come in for just the 1 appt on

## 2019-01-02 NOTE — TELEPHONE ENCOUNTER
PSR - pt needs to be seen for ER follow up first. Per Dr Octavia Jones, cannot fit in med check with ER follow up at same time. Needs  or use [employer]. Thanks.

## 2019-01-02 NOTE — TELEPHONE ENCOUNTER
Just for clarification - it is okay to keep the appointment on January 14th for the ER follow up? Or does she need to be scheduled in sooner? Thanks!

## 2019-01-08 ENCOUNTER — APPOINTMENT (OUTPATIENT)
Dept: GENERAL RADIOLOGY | Age: 44
End: 2019-01-08
Attending: FAMILY MEDICINE
Payer: COMMERCIAL

## 2019-01-08 ENCOUNTER — HOSPITAL ENCOUNTER (OUTPATIENT)
Age: 44
Discharge: HOME | End: 2019-01-08
Attending: FAMILY MEDICINE
Payer: COMMERCIAL

## 2019-01-08 VITALS
OXYGEN SATURATION: 100 % | BODY MASS INDEX: 35.88 KG/M2 | DIASTOLIC BLOOD PRESSURE: 77 MMHG | RESPIRATION RATE: 16 BRPM | WEIGHT: 195 LBS | HEART RATE: 80 BPM | TEMPERATURE: 98 F | HEIGHT: 62 IN | SYSTOLIC BLOOD PRESSURE: 115 MMHG

## 2019-01-08 DIAGNOSIS — R42 VERTIGO: ICD-10-CM

## 2019-01-08 DIAGNOSIS — H61.23 BILATERAL IMPACTED CERUMEN: ICD-10-CM

## 2019-01-08 DIAGNOSIS — J01.01 ACUTE RECURRENT MAXILLARY SINUSITIS: ICD-10-CM

## 2019-01-08 DIAGNOSIS — J20.9 ACUTE BRONCHITIS, UNSPECIFIED ORGANISM: Primary | ICD-10-CM

## 2019-01-08 PROCEDURE — 71046 X-RAY EXAM CHEST 2 VIEWS: CPT | Performed by: FAMILY MEDICINE

## 2019-01-08 PROCEDURE — 69210 REMOVE IMPACTED EAR WAX UNI: CPT

## 2019-01-08 PROCEDURE — 99213 OFFICE O/P EST LOW 20 MIN: CPT

## 2019-01-08 PROCEDURE — 99214 OFFICE O/P EST MOD 30 MIN: CPT

## 2019-01-08 RX ORDER — BENZONATATE 100 MG/1
100 CAPSULE ORAL 3 TIMES DAILY PRN
Qty: 30 CAPSULE | Refills: 0 | Status: SHIPPED | OUTPATIENT
Start: 2019-01-08 | End: 2019-02-07

## 2019-01-08 RX ORDER — AZITHROMYCIN 250 MG/1
TABLET, FILM COATED ORAL
Qty: 1 PACKAGE | Refills: 0 | Status: SHIPPED | OUTPATIENT
Start: 2019-01-08 | End: 2019-01-14 | Stop reason: ALTCHOICE

## 2019-01-08 NOTE — ED PROVIDER NOTES
Patient Seen in: [facility]    History   Patient presents with:  Dizziness (neurologic)  Fever (infectious)    Stated Complaint: dizziness    HPI  Patient is hearing impaired and communication was done via paper.     [address] prolonged QT Syndrome   • Leaking of urine    • Leg swelling    • Mouth sores    • Osteoarthritis    • Pacemaker    • Prolonged QT interval syndrome    • Seizure disorder ([facility] 11/2017    last Sezure 2017   • Shortness of breath    • Visual impairment Exam    Gen: Patient in no acute distress. Head: Normocephalic atraumatic. Bilateral maxillary sinus tenderness on palpation. Ears: Normal external ear exam. Bilateral cerumen in the ear canals. Nose: Bilateral nares are clear. Mouth: MMM.  Posterior [id number] [phone]    Go to   As needed        Medications Prescribed:  Current Discharge Medication List    START taking these medications    azithromycin (ZITHROMAX Z-EYAD) 250 MG Oral Tab  500 mg once followed by 250 mg daily x 4 days  Qty: 1 Packa

## 2019-01-08 NOTE — ED INITIAL ASSESSMENT (HPI)
Pt woke up in middle of night with sweats and dizzy spells ;tactile fever x 2 nights; pt has hx of vertigo and took meclizine to help with dizziness; pt states she is getting over bronchitis and has lingering cough and wheezing; denies chest pain; pt state

## 2019-01-14 ENCOUNTER — OFFICE VISIT (OUTPATIENT)
Dept: INTERNAL MEDICINE CLINIC | Facility: CLINIC | Age: 44
End: 2019-01-14
Payer: COMMERCIAL

## 2019-01-14 VITALS
DIASTOLIC BLOOD PRESSURE: 72 MMHG | HEIGHT: 62 IN | HEART RATE: 80 BPM | BODY MASS INDEX: 37.26 KG/M2 | SYSTOLIC BLOOD PRESSURE: 100 MMHG | WEIGHT: 202.5 LBS | TEMPERATURE: 98 F | RESPIRATION RATE: 12 BRPM

## 2019-01-14 DIAGNOSIS — J45.30 MILD PERSISTENT ASTHMA WITHOUT COMPLICATION: ICD-10-CM

## 2019-01-14 DIAGNOSIS — R05.9 COUGH: Primary | ICD-10-CM

## 2019-01-14 PROCEDURE — 99214 OFFICE O/P EST MOD 30 MIN: CPT | Performed by: INTERNAL MEDICINE

## 2019-01-14 RX ORDER — MEXILETINE HYDROCHLORIDE 150 MG/1
CAPSULE ORAL
Refills: 2 | COMMUNITY
Start: 2019-01-11 | End: 2019-09-27

## 2019-01-14 RX ORDER — PROPRANOLOL HYDROCHLORIDE 160 MG/1
CAPSULE, EXTENDED RELEASE ORAL
Refills: 5 | COMMUNITY
Start: 2019-01-11

## 2019-01-14 NOTE — PROGRESS NOTES
[facility]    CHIEF COMPLAINT:  Patient presents with:  ER F/U        HISTORY OF PRESENT ILLNESS:  Here for follow up er visit. Seen for cough and uri symptoms. Diagnosed with bronchitis. She was given a z krys and benzonatate. Feeling better.  C Potassium Chloride ER 20 MEQ Oral Tab CR TK 3 TS PO D Disp:  Rfl: 3   PHENYTOIN INFATABS 50 MG Oral Chew Tab CHEW AND SWALLOW 1 TABLET BY MOUTH DAILY Disp: 30 tablet Rfl: 11   Spacer/Aero Chamber Mouthpiece Does not apply Misc Use with inhaler.  Disp: 1 e 2 drops twice a week. If still with cerumen we can try to drain at next visit. Pt verbalized understanding. Return to clinic in 2 weeks for med check.     Henry Mckeon MD

## 2019-01-15 ENCOUNTER — HOSPITAL ENCOUNTER (OUTPATIENT)
Dept: MAMMOGRAPHY | Facility: HOSPITAL | Age: 44
Discharge: HOME | End: 2019-01-15
Attending: INTERNAL MEDICINE
Payer: COMMERCIAL

## 2019-01-15 DIAGNOSIS — Z12.31 ENCOUNTER FOR SCREENING MAMMOGRAM FOR MALIGNANT NEOPLASM OF BREAST: ICD-10-CM

## 2019-01-15 PROCEDURE — 77067 SCR MAMMO BI INCL CAD: CPT | Performed by: INTERNAL MEDICINE

## 2019-01-15 PROCEDURE — 77063 BREAST TOMOSYNTHESIS BI: CPT | Performed by: INTERNAL MEDICINE

## 2019-01-25 ENCOUNTER — HOSPITAL ENCOUNTER (OUTPATIENT)
Dept: MAMMOGRAPHY | Age: 44
Discharge: HOME | End: 2019-01-25
Attending: INTERNAL MEDICINE
Payer: COMMERCIAL

## 2019-01-25 ENCOUNTER — HOSPITAL ENCOUNTER (OUTPATIENT)
Dept: ULTRASOUND IMAGING | Age: 44
Discharge: HOME | End: 2019-01-25
Attending: INTERNAL MEDICINE
Payer: COMMERCIAL

## 2019-01-25 DIAGNOSIS — R92.2 INCONCLUSIVE MAMMOGRAM: ICD-10-CM

## 2019-01-25 PROCEDURE — 77062 BREAST TOMOSYNTHESIS BI: CPT | Performed by: INTERNAL MEDICINE

## 2019-01-25 PROCEDURE — 76642 ULTRASOUND BREAST LIMITED: CPT | Performed by: INTERNAL MEDICINE

## 2019-01-25 PROCEDURE — 77066 DX MAMMO INCL CAD BI: CPT | Performed by: INTERNAL MEDICINE

## 2019-01-29 ENCOUNTER — TELEPHONE (OUTPATIENT)
Dept: NEUROLOGY | Facility: CLINIC | Age: 44
End: 2019-01-29

## 2019-02-06 ENCOUNTER — PRIOR ORIGINAL RECORDS (OUTPATIENT)
Dept: OTHER | Age: 44
End: 2019-02-06

## 2019-02-06 DIAGNOSIS — J45.30 MILD PERSISTENT ASTHMA WITHOUT COMPLICATION: Chronic | ICD-10-CM

## 2019-02-06 RX ORDER — BUDESONIDE AND FORMOTEROL FUMARATE DIHYDRATE 160; 4.5 UG/1; UG/1
AEROSOL RESPIRATORY (INHALATION)
Qty: 3 INHALER | Refills: 0 | Status: SHIPPED | OUTPATIENT
Start: 2019-02-06 | End: 2019-03-04

## 2019-02-07 ENCOUNTER — MYAURORA ACCOUNT LINK (OUTPATIENT)
Dept: OTHER | Age: 44
End: 2019-02-07

## 2019-02-07 ENCOUNTER — PRIOR ORIGINAL RECORDS (OUTPATIENT)
Dept: OTHER | Age: 44
End: 2019-02-07

## 2019-02-25 LAB
AMB EXT CHOL/HDL RATIO: 3
AMB EXT CHOLESTEROL, TOTAL: 225 MG/DL
AMB EXT GLUCOSE: 72 MG/DL
AMB EXT HDL CHOLESTEROL: 75 MG/DL
AMB EXT LDL CHOLESTEROL, DIRECT: 133 MG/DL
AMB EXT TRIGLYCERIDES: 80 MG/DL

## 2019-02-27 DIAGNOSIS — J45.20 MILD INTERMITTENT ASTHMA WITHOUT COMPLICATION: ICD-10-CM

## 2019-02-28 ENCOUNTER — APPOINTMENT (OUTPATIENT)
Dept: LAB | Age: 44
End: 2019-02-28
Attending: Other
Payer: COMMERCIAL

## 2019-02-28 ENCOUNTER — OFFICE VISIT (OUTPATIENT)
Dept: NEUROLOGY | Facility: CLINIC | Age: 44
End: 2019-02-28
Payer: COMMERCIAL

## 2019-02-28 VITALS
HEART RATE: 89 BPM | SYSTOLIC BLOOD PRESSURE: 128 MMHG | BODY MASS INDEX: 37.17 KG/M2 | WEIGHT: 202 LBS | HEIGHT: 62 IN | DIASTOLIC BLOOD PRESSURE: 49 MMHG | RESPIRATION RATE: 16 BRPM

## 2019-02-28 VITALS — DIASTOLIC BLOOD PRESSURE: 80 MMHG | HEART RATE: 88 BPM | SYSTOLIC BLOOD PRESSURE: 122 MMHG | WEIGHT: 199 LBS

## 2019-02-28 VITALS
DIASTOLIC BLOOD PRESSURE: 70 MMHG | BODY MASS INDEX: 35.88 KG/M2 | HEIGHT: 62 IN | HEART RATE: 84 BPM | WEIGHT: 195 LBS | SYSTOLIC BLOOD PRESSURE: 110 MMHG

## 2019-02-28 VITALS
SYSTOLIC BLOOD PRESSURE: 100 MMHG | RESPIRATION RATE: 16 BRPM | BODY MASS INDEX: 36.8 KG/M2 | HEIGHT: 62 IN | DIASTOLIC BLOOD PRESSURE: 62 MMHG | WEIGHT: 200 LBS | HEART RATE: 80 BPM

## 2019-02-28 VITALS — HEART RATE: 80 BPM | WEIGHT: 202 LBS | DIASTOLIC BLOOD PRESSURE: 62 MMHG | SYSTOLIC BLOOD PRESSURE: 98 MMHG

## 2019-02-28 VITALS
RESPIRATION RATE: 16 BRPM | WEIGHT: 207 LBS | HEART RATE: 79 BPM | DIASTOLIC BLOOD PRESSURE: 74 MMHG | BODY MASS INDEX: 38.09 KG/M2 | HEIGHT: 62 IN | SYSTOLIC BLOOD PRESSURE: 105 MMHG

## 2019-02-28 VITALS
HEART RATE: 84 BPM | DIASTOLIC BLOOD PRESSURE: 66 MMHG | HEIGHT: 62 IN | WEIGHT: 203 LBS | SYSTOLIC BLOOD PRESSURE: 90 MMHG | RESPIRATION RATE: 16 BRPM | BODY MASS INDEX: 37.36 KG/M2

## 2019-02-28 VITALS — DIASTOLIC BLOOD PRESSURE: 64 MMHG | SYSTOLIC BLOOD PRESSURE: 96 MMHG | HEART RATE: 80 BPM | HEIGHT: 62 IN

## 2019-02-28 VITALS — DIASTOLIC BLOOD PRESSURE: 80 MMHG | HEIGHT: 62 IN | HEART RATE: 80 BPM | SYSTOLIC BLOOD PRESSURE: 104 MMHG

## 2019-02-28 VITALS — WEIGHT: 199 LBS | HEART RATE: 84 BPM | DIASTOLIC BLOOD PRESSURE: 70 MMHG | SYSTOLIC BLOOD PRESSURE: 110 MMHG

## 2019-02-28 VITALS — WEIGHT: 200 LBS | SYSTOLIC BLOOD PRESSURE: 122 MMHG | DIASTOLIC BLOOD PRESSURE: 80 MMHG | HEART RATE: 64 BPM

## 2019-02-28 DIAGNOSIS — G40.909 SEIZURE DISORDER (HCC): Primary | ICD-10-CM

## 2019-02-28 DIAGNOSIS — G40.909 SEIZURE DISORDER (HCC): ICD-10-CM

## 2019-02-28 DIAGNOSIS — I45.81: ICD-10-CM

## 2019-02-28 LAB — PHENYTOIN SERPL-MCNC: 24.7 UG/ML (ref 10–20)

## 2019-02-28 PROCEDURE — 36415 COLL VENOUS BLD VENIPUNCTURE: CPT | Performed by: OTHER

## 2019-02-28 PROCEDURE — 99213 OFFICE O/P EST LOW 20 MIN: CPT | Performed by: OTHER

## 2019-02-28 PROCEDURE — 80185 ASSAY OF PHENYTOIN TOTAL: CPT | Performed by: OTHER

## 2019-02-28 RX ORDER — FUROSEMIDE 40 MG/1
40 TABLET ORAL DAILY
Refills: 3 | COMMUNITY
Start: 2019-02-25

## 2019-02-28 NOTE — PROGRESS NOTES
[facility] with [address]  11/20/1975  Primary Care Provider:  Angela Adair MD    2/28/2019  Accompanied visit:  ( ) No (x) yes, by:     37year old yo patient b Chamber Mouthpiece Does not apply Misc, Use with inhaler. , Disp: 1 each, Rfl: 0  •  BENADRYL 25 MG OR CAPS, 2 CAPSULES QD.   Will take extra 2 if experiencing allergy sx., Disp: , Rfl:   PRN:     Allergies:    Levaquin                PALPITATIONS  [place] [address]  2/28/2019, Time completed 8:44 AM    Decision making:  (  ) labs reviewed/ordered - 1  (  ) new diagnosis: - 1  (  ) Images & studies independently reviewed -non F2F  (  ) Case/studies discussed with other caregivers - -non F2F  (

## 2019-03-01 RX ORDER — MONTELUKAST SODIUM 10 MG/1
TABLET ORAL
Qty: 90 TABLET | Refills: 0 | Status: SHIPPED | OUTPATIENT
Start: 2019-03-01 | End: 2019-05-20

## 2019-03-01 NOTE — TELEPHONE ENCOUNTER
Last OV relevant to medication: 1/14/19 er f/u  Last refill date: 12/26/18     #/refills: 90/0  When pt was asked to return for OV: 2 weeks  Upcoming appt/reason: 3/4/19 f/u

## 2019-03-04 ENCOUNTER — OFFICE VISIT (OUTPATIENT)
Dept: INTERNAL MEDICINE CLINIC | Facility: CLINIC | Age: 44
End: 2019-03-04
Payer: COMMERCIAL

## 2019-03-04 VITALS
OXYGEN SATURATION: 98 % | SYSTOLIC BLOOD PRESSURE: 122 MMHG | DIASTOLIC BLOOD PRESSURE: 78 MMHG | HEART RATE: 81 BPM | BODY MASS INDEX: 36.74 KG/M2 | TEMPERATURE: 99 F | WEIGHT: 199.63 LBS | HEIGHT: 62 IN | RESPIRATION RATE: 14 BRPM

## 2019-03-04 DIAGNOSIS — E78.5 HYPERLIPIDEMIA, UNSPECIFIED HYPERLIPIDEMIA TYPE: ICD-10-CM

## 2019-03-04 DIAGNOSIS — J45.30 MILD PERSISTENT ASTHMA WITHOUT COMPLICATION: Chronic | ICD-10-CM

## 2019-03-04 DIAGNOSIS — J01.01 ACUTE RECURRENT MAXILLARY SINUSITIS: Primary | ICD-10-CM

## 2019-03-04 PROCEDURE — 99214 OFFICE O/P EST MOD 30 MIN: CPT | Performed by: INTERNAL MEDICINE

## 2019-03-04 RX ORDER — ALBUTEROL SULFATE 90 UG/1
2 AEROSOL, METERED RESPIRATORY (INHALATION) EVERY 6 HOURS PRN
COMMUNITY
End: 2020-02-17

## 2019-03-04 RX ORDER — FLUTICASONE PROPIONATE AND SALMETEROL 232; 14 UG/1; UG/1
1 POWDER, METERED RESPIRATORY (INHALATION) 2 TIMES DAILY
Qty: 1 EACH | Refills: 1 | Status: SHIPPED | OUTPATIENT
Start: 2019-03-04 | End: 2019-04-29

## 2019-03-04 RX ORDER — AMOXICILLIN AND CLAVULANATE POTASSIUM 875; 125 MG/1; MG/1
1 TABLET, FILM COATED ORAL 2 TIMES DAILY
Qty: 20 TABLET | Refills: 0 | Status: SHIPPED | OUTPATIENT
Start: 2019-03-04 | End: 2019-03-21 | Stop reason: ALTCHOICE

## 2019-03-04 RX ORDER — PANTOPRAZOLE SODIUM 40 MG/1
40 TABLET, DELAYED RELEASE ORAL
COMMUNITY
End: 2020-03-17

## 2019-03-04 RX ORDER — IBUPROFEN 200 MG
400 TABLET ORAL EVERY 6 HOURS PRN
COMMUNITY
Start: 2015-07-07

## 2019-03-05 NOTE — PROGRESS NOTES
[facility]    CHIEF COMPLAINT:  Patient presents with: Follow - Up: brconchitis followup, sinus issues have come back started last tuesday. HISTORY OF PRESENT ILLNESS:  Complains of sinus pain and pressure for a week.  Also with cough agai Rfl: 0   Meclizine HCl 25 MG Oral Tab Take 1 tablet (25 mg total) by mouth 3 (three) times daily as needed.  (Patient taking differently: Take by mouth 3 (three) times daily as needed.  ) Disp: 90 tablet Rfl: 0   Phenytoin Sodium Extended 100 MG Oral Cap Ta Fluticasone-Salmeterol 232-14 MCG/ACT Inhalation Aerosol Powder, Breath Activated; Inhale 1 puff into the lungs 2 (two) times daily. Dispense: 1 each; Refill: 1    3. Hyperlipidemia  ASCVD calculated risk 0.5%  Monitor diet and exercise.    Will recheck in

## 2019-03-06 ENCOUNTER — TELEPHONE (OUTPATIENT)
Dept: OBGYN CLINIC | Facility: CLINIC | Age: 44
End: 2019-03-06

## 2019-03-07 ENCOUNTER — APPOINTMENT (OUTPATIENT)
Dept: CT IMAGING | Facility: HOSPITAL | Age: 44
End: 2019-03-07
Attending: PHYSICIAN ASSISTANT
Payer: COMMERCIAL

## 2019-03-07 ENCOUNTER — HOSPITAL ENCOUNTER (EMERGENCY)
Facility: HOSPITAL | Age: 44
Discharge: HOME | End: 2019-03-08
Attending: STUDENT IN AN ORGANIZED HEALTH CARE EDUCATION/TRAINING PROGRAM
Payer: COMMERCIAL

## 2019-03-07 DIAGNOSIS — N93.9 VAGINAL BLEEDING: Primary | ICD-10-CM

## 2019-03-07 DIAGNOSIS — N83.202 LEFT OVARIAN CYST: ICD-10-CM

## 2019-03-07 LAB
ALBUMIN SERPL-MCNC: 3.5 G/DL (ref 3.4–5)
ALBUMIN/GLOB SERPL: 0.8 {RATIO} (ref 1–2)
ALP LIVER SERPL-CCNC: 136 U/L (ref 37–98)
ALT SERPL-CCNC: 33 U/L (ref 13–56)
ANION GAP SERPL CALC-SCNC: 8 MMOL/L (ref 0–18)
AST SERPL-CCNC: 27 U/L (ref 15–37)
BASOPHILS # BLD AUTO: 0.05 X10(3) UL (ref 0–0.2)
BASOPHILS NFR BLD AUTO: 0.7 %
BILIRUB SERPL-MCNC: 0.5 MG/DL (ref 0.1–2)
BILIRUB UR QL STRIP.AUTO: NEGATIVE
BUN BLD-MCNC: 6 MG/DL (ref 7–18)
BUN/CREAT SERPL: 8 (ref 10–20)
CALCIUM BLD-MCNC: 9 MG/DL (ref 8.5–10.1)
CHLORIDE SERPL-SCNC: 103 MMOL/L (ref 98–107)
CO2 SERPL-SCNC: 28 MMOL/L (ref 21–32)
COLOR UR AUTO: YELLOW
CREAT BLD-MCNC: 0.75 MG/DL (ref 0.55–1.02)
DEPRECATED RDW RBC AUTO: 44.4 FL (ref 35.1–46.3)
EOSINOPHIL # BLD AUTO: 0.42 X10(3) UL (ref 0–0.7)
EOSINOPHIL NFR BLD AUTO: 6.1 %
ERYTHROCYTE [DISTWIDTH] IN BLOOD BY AUTOMATED COUNT: 13 % (ref 11–15)
GLOBULIN PLAS-MCNC: 4.2 G/DL (ref 2.8–4.4)
GLUCOSE BLD-MCNC: 83 MG/DL (ref 70–99)
GLUCOSE UR STRIP.AUTO-MCNC: NEGATIVE MG/DL
HCT VFR BLD AUTO: 40 % (ref 35–48)
HGB BLD-MCNC: 12.8 G/DL (ref 12–16)
IMM GRANULOCYTES # BLD AUTO: 0.01 X10(3) UL (ref 0–1)
IMM GRANULOCYTES NFR BLD: 0.1 %
KETONES UR STRIP.AUTO-MCNC: NEGATIVE MG/DL
LEUKOCYTE ESTERASE UR QL STRIP.AUTO: NEGATIVE
LYMPHOCYTES # BLD AUTO: 2.03 X10(3) UL (ref 1–4)
LYMPHOCYTES NFR BLD AUTO: 29.7 %
M PROTEIN MFR SERPL ELPH: 7.7 G/DL (ref 6.4–8.2)
MCH RBC QN AUTO: 30 PG (ref 26–34)
MCHC RBC AUTO-ENTMCNC: 32 G/DL (ref 31–37)
MCV RBC AUTO: 93.9 FL (ref 80–100)
MONOCYTES # BLD AUTO: 0.7 X10(3) UL (ref 0.1–1)
MONOCYTES NFR BLD AUTO: 10.2 %
NEUTROPHILS # BLD AUTO: 3.63 X10 (3) UL (ref 1.5–7.7)
NEUTROPHILS # BLD AUTO: 3.63 X10(3) UL (ref 1.5–7.7)
NEUTROPHILS NFR BLD AUTO: 53.2 %
NITRITE UR QL STRIP.AUTO: NEGATIVE
OSMOLALITY SERPL CALC.SUM OF ELEC: 285 MOSM/KG (ref 275–295)
PH UR STRIP.AUTO: 5 [PH] (ref 4.5–8)
PLATELET # BLD AUTO: 227 10(3)UL (ref 150–450)
POCT URINE PREGNANCY: NEGATIVE
POTASSIUM SERPL-SCNC: 3.7 MMOL/L (ref 3.5–5.1)
PROT UR STRIP.AUTO-MCNC: NEGATIVE MG/DL
RBC # BLD AUTO: 4.26 X10(6)UL (ref 3.8–5.3)
RBC #/AREA URNS AUTO: >10 /HPF
SODIUM SERPL-SCNC: 139 MMOL/L (ref 136–145)
SP GR UR STRIP.AUTO: 1.01 (ref 1–1.03)
UROBILINOGEN UR STRIP.AUTO-MCNC: <2 MG/DL
WBC # BLD AUTO: 6.8 X10(3) UL (ref 4–11)

## 2019-03-07 PROCEDURE — 85025 COMPLETE CBC W/AUTO DIFF WBC: CPT | Performed by: PHYSICIAN ASSISTANT

## 2019-03-07 PROCEDURE — 81025 URINE PREGNANCY TEST: CPT

## 2019-03-07 PROCEDURE — 74176 CT ABD & PELVIS W/O CONTRAST: CPT | Performed by: PHYSICIAN ASSISTANT

## 2019-03-07 PROCEDURE — 81001 URINALYSIS AUTO W/SCOPE: CPT | Performed by: STUDENT IN AN ORGANIZED HEALTH CARE EDUCATION/TRAINING PROGRAM

## 2019-03-07 PROCEDURE — 96361 HYDRATE IV INFUSION ADD-ON: CPT

## 2019-03-07 PROCEDURE — 80053 COMPREHEN METABOLIC PANEL: CPT | Performed by: PHYSICIAN ASSISTANT

## 2019-03-07 PROCEDURE — 87660 TRICHOMONAS VAGIN DIR PROBE: CPT | Performed by: PHYSICIAN ASSISTANT

## 2019-03-07 PROCEDURE — 87510 GARDNER VAG DNA DIR PROBE: CPT | Performed by: PHYSICIAN ASSISTANT

## 2019-03-07 PROCEDURE — 87480 CANDIDA DNA DIR PROBE: CPT | Performed by: PHYSICIAN ASSISTANT

## 2019-03-07 PROCEDURE — 81001 URINALYSIS AUTO W/SCOPE: CPT | Performed by: EMERGENCY MEDICINE

## 2019-03-07 PROCEDURE — 96360 HYDRATION IV INFUSION INIT: CPT

## 2019-03-07 PROCEDURE — 99284 EMERGENCY DEPT VISIT MOD MDM: CPT

## 2019-03-07 PROCEDURE — 99285 EMERGENCY DEPT VISIT HI MDM: CPT

## 2019-03-07 RX ORDER — MEXILETINE HYDROCHLORIDE 150 MG/1
CAPSULE ORAL
Qty: 60 CAPSULE | Refills: 11 | Status: SHIPPED | OUTPATIENT
Start: 2019-03-07

## 2019-03-08 ENCOUNTER — APPOINTMENT (OUTPATIENT)
Dept: ULTRASOUND IMAGING | Facility: HOSPITAL | Age: 44
End: 2019-03-08
Attending: PHYSICIAN ASSISTANT
Payer: COMMERCIAL

## 2019-03-08 VITALS
TEMPERATURE: 97 F | OXYGEN SATURATION: 97 % | WEIGHT: 195 LBS | HEIGHT: 62 IN | DIASTOLIC BLOOD PRESSURE: 64 MMHG | SYSTOLIC BLOOD PRESSURE: 128 MMHG | RESPIRATION RATE: 16 BRPM | BODY MASS INDEX: 35.88 KG/M2 | HEART RATE: 80 BPM

## 2019-03-08 PROCEDURE — 76856 US EXAM PELVIC COMPLETE: CPT | Performed by: PHYSICIAN ASSISTANT

## 2019-03-08 PROCEDURE — 76830 TRANSVAGINAL US NON-OB: CPT | Performed by: PHYSICIAN ASSISTANT

## 2019-03-08 PROCEDURE — 93975 VASCULAR STUDY: CPT | Performed by: PHYSICIAN ASSISTANT

## 2019-03-08 RX ORDER — TRAMADOL HYDROCHLORIDE 50 MG/1
TABLET ORAL EVERY 4 HOURS PRN
Qty: 10 TABLET | Refills: 0 | Status: SHIPPED | OUTPATIENT
Start: 2019-03-08 | End: 2019-03-15

## 2019-03-08 NOTE — ED PROVIDER NOTES
Patient is a 49-year-old female who had presented to the emergency department with abdominal pain, flank pain and vaginal bleeding. Patient was initially evaluated by the PA in conjunction with my partner, Dr. Jaz Mckeon.     Patient was signed over to me at [facility]

## 2019-03-08 NOTE — ED PROVIDER NOTES
Patient Seen in: [facility] Emergency Department    History   Patient presents with:  Abdomen/Flank Pain (GI/)    Stated Complaint: right flank pain, vaginal bleeding    JEM    Zhang Orr is a 78-year-old female with a history of hearing loss, irrit Past Surgical History:   Procedure Laterality Date   • CARDIAC DEFIBRILLATOR PLACEMENT  2015    dual chamber-[employer] [id number]   • CARDIAC PACEMAKER PLACEMENT  1992   • [address]   • COLONOSCOPY N/A 9/7/2018    Per rhythm and normal heart sounds.  Exam reveals no friction rub.  No murmur heard. Pulmonary/Chest: Effort normal and breath sounds normal throughout lung fields. No wheezes or rales appreciated.    Abdominal: Soft.  Bowel sounds are normal in all four quadr Discussed with and evaluated the patient with Dr. Doe Standing    Differential diagnosis includes right pyelonephritis, nephrolithiasis, right ovarian cyst, endometriosis.   CT and ultrasound are pending lab work is unremarkable there is no signs of urinary trac

## 2019-03-08 NOTE — ED NOTES
Pt is deaf, Declined  Patient requested to communicate via writing. Pt does write burning with urination.

## 2019-03-08 NOTE — ED INITIAL ASSESSMENT (HPI)
Presents with back pain to right side described as sharp started betweem 1-2pm today. Pt also writes vaginal bleeding started at 3:30 today.  Hx of ovarian cyst

## 2019-03-14 RX ORDER — POTASSIUM CHLORIDE 20 MEQ/1
TABLET, EXTENDED RELEASE ORAL
Qty: 90 TABLET | Refills: 12 | Status: SHIPPED | OUTPATIENT
Start: 2019-03-14 | End: 2020-05-12 | Stop reason: SDUPTHER

## 2019-03-15 ENCOUNTER — TELEPHONE (OUTPATIENT)
Dept: CARDIOLOGY | Age: 44
End: 2019-03-15

## 2019-03-15 DIAGNOSIS — R60.0 BILATERAL LOWER EXTREMITY EDEMA: Primary | ICD-10-CM

## 2019-03-15 RX ORDER — FUROSEMIDE 20 MG/1
TABLET ORAL
Qty: 3 TABLET | Refills: 0 | Status: SHIPPED | OUTPATIENT
Start: 2019-03-15 | End: 2019-03-19

## 2019-03-15 RX ORDER — FUROSEMIDE 40 MG/1
40 TABLET ORAL DAILY
COMMUNITY
Start: 2019-02-07 | End: 2019-03-19

## 2019-03-19 ENCOUNTER — OFFICE VISIT (OUTPATIENT)
Dept: CARDIOLOGY | Age: 44
End: 2019-03-19

## 2019-03-19 ENCOUNTER — LAB SERVICES (OUTPATIENT)
Dept: LAB | Age: 44
End: 2019-03-19

## 2019-03-19 VITALS
WEIGHT: 200 LBS | RESPIRATION RATE: 16 BRPM | SYSTOLIC BLOOD PRESSURE: 100 MMHG | HEART RATE: 80 BPM | DIASTOLIC BLOOD PRESSURE: 72 MMHG | BODY MASS INDEX: 36.58 KG/M2

## 2019-03-19 DIAGNOSIS — Z95.810 ICD (IMPLANTABLE CARDIOVERTER-DEFIBRILLATOR), DUAL, IN SITU: ICD-10-CM

## 2019-03-19 DIAGNOSIS — I49.01 VENTRICULAR FIBRILLATION (CMD): ICD-10-CM

## 2019-03-19 DIAGNOSIS — I45.81: ICD-10-CM

## 2019-03-19 DIAGNOSIS — R60.0 LOCALIZED EDEMA: ICD-10-CM

## 2019-03-19 DIAGNOSIS — R60.0 BILATERAL LOWER EXTREMITY EDEMA: ICD-10-CM

## 2019-03-19 DIAGNOSIS — I45.81 LONG Q-T SYNDROME: Primary | ICD-10-CM

## 2019-03-19 LAB
ANION GAP SERPL CALC-SCNC: 13 MMOL/L (ref 10–20)
BUN SERPL-MCNC: 10 MG/DL (ref 6–20)
BUN/CREAT SERPL: 16 (ref 7–25)
CALCIUM SERPL-MCNC: 8.7 MG/DL (ref 8.4–10.2)
CHLORIDE SERPL-SCNC: 103 MMOL/L (ref 98–107)
CO2 SERPL-SCNC: 31 MMOL/L (ref 21–32)
CREAT SERPL-MCNC: 0.62 MG/DL (ref 0.51–0.95)
FASTING STATUS PATIENT QL REPORTED: 4 HRS
GLUCOSE SERPL-MCNC: 114 MG/DL (ref 65–99)
POTASSIUM SERPL-SCNC: 4.5 MMOL/L (ref 3.4–5.1)
SODIUM SERPL-SCNC: 142 MMOL/L (ref 135–145)

## 2019-03-19 PROCEDURE — 36415 COLL VENOUS BLD VENIPUNCTURE: CPT | Performed by: INTERNAL MEDICINE

## 2019-03-19 PROCEDURE — 99214 OFFICE O/P EST MOD 30 MIN: CPT | Performed by: NURSE PRACTITIONER

## 2019-03-19 PROCEDURE — 80048 BASIC METABOLIC PNL TOTAL CA: CPT | Performed by: INTERNAL MEDICINE

## 2019-03-19 RX ORDER — BUDESONIDE AND FORMOTEROL FUMARATE DIHYDRATE 160; 4.5 UG/1; UG/1
AEROSOL RESPIRATORY (INHALATION)
COMMUNITY

## 2019-03-19 RX ORDER — PHENYTOIN SODIUM 100 MG/1
100 CAPSULE, EXTENDED RELEASE ORAL 4 TIMES DAILY
COMMUNITY

## 2019-03-19 RX ORDER — LANOLIN ALCOHOL/MO/W.PET/CERES
1 CREAM (GRAM) TOPICAL 2 TIMES DAILY
COMMUNITY
Start: 2018-12-24 | End: 2019-09-03 | Stop reason: SDUPTHER

## 2019-03-19 RX ORDER — FUROSEMIDE 20 MG/1
20 TABLET ORAL DAILY
COMMUNITY
End: 2019-03-19 | Stop reason: DRUGHIGH

## 2019-03-19 RX ORDER — PROPRANOLOL HYDROCHLORIDE 160 MG/1
4 CAPSULE, EXTENDED RELEASE ORAL DAILY
COMMUNITY
Start: 2018-11-30 | End: 2019-06-04 | Stop reason: SDUPTHER

## 2019-03-19 RX ORDER — MONTELUKAST SODIUM 10 MG/1
40 TABLET ORAL DAILY
COMMUNITY

## 2019-03-19 RX ORDER — ALBUTEROL SULFATE 0.63 MG/3ML
SOLUTION RESPIRATORY (INHALATION)
COMMUNITY

## 2019-03-19 RX ORDER — TRAMADOL HYDROCHLORIDE 50 MG/1
TABLET ORAL
COMMUNITY

## 2019-03-19 RX ORDER — FUROSEMIDE 20 MG/1
20 TABLET ORAL 2 TIMES DAILY
COMMUNITY

## 2019-03-21 ENCOUNTER — OFFICE VISIT (OUTPATIENT)
Dept: OBGYN CLINIC | Facility: CLINIC | Age: 44
End: 2019-03-21
Payer: COMMERCIAL

## 2019-03-21 ENCOUNTER — PATIENT MESSAGE (OUTPATIENT)
Dept: OBGYN CLINIC | Facility: CLINIC | Age: 44
End: 2019-03-21

## 2019-03-21 VITALS
RESPIRATION RATE: 16 BRPM | SYSTOLIC BLOOD PRESSURE: 104 MMHG | DIASTOLIC BLOOD PRESSURE: 70 MMHG | WEIGHT: 205 LBS | HEIGHT: 62 IN | BODY MASS INDEX: 37.73 KG/M2 | HEART RATE: 88 BPM

## 2019-03-21 DIAGNOSIS — N80.9 ENDOMETRIOSIS: Primary | ICD-10-CM

## 2019-03-21 DIAGNOSIS — N83.201 CYST OF RIGHT OVARY: ICD-10-CM

## 2019-03-21 DIAGNOSIS — R10.2 PELVIC PAIN: ICD-10-CM

## 2019-03-21 DIAGNOSIS — R45.4 IRRITABILITY: ICD-10-CM

## 2019-03-21 DIAGNOSIS — N94.19 DYSPAREUNIA DUE TO MEDICAL CONDITION IN FEMALE: ICD-10-CM

## 2019-03-21 DIAGNOSIS — R45.86 EMOTIONAL LABILITY: ICD-10-CM

## 2019-03-21 PROCEDURE — 99214 OFFICE O/P EST MOD 30 MIN: CPT | Performed by: NURSE PRACTITIONER

## 2019-03-21 NOTE — PROGRESS NOTES
GYN H&P     3/21/2019  8:38 AM    CC: Patient is here f/u on medications   (accompanied by - female)    HPI: Patient is a 37year old  here.  Reports she was recently seen in the ED for issues related to a right ovarian cyst. Pt states 232-14 MCG/ACT Inhalation Aerosol Powder, Breath Activated Inhale 1 puff into the lungs 2 (two) times daily.  Disp: 1 each Rfl: 1   MONTELUKAST SODIUM 10 MG Oral Tab TAKE 1 TABLET(10 MG) BY MOUTH EVERY DAY Disp: 90 tablet Rfl: 0   furosemide 40 MG Oral Tab Jervell and Cain-Sondra syndrome     congenital prolonged QT Syndrome   • Leaking of urine    • Leg swelling    • Mouth sores    • Osteoarthritis    • Pacemaker    • Prolonged QT interval syndrome    • [facility]2017    last Yenire 2017 Other Mother         hemochromatosis, endometrosis   • Heart Attack Paternal Grandfather          1974   • Cancer Maternal Grandmother         Lung   • Other (Other) Maternal Grandfather         Stroke   • Asthma Brother    • Other (Other) Paternal Sodium (ORILISSA) 200 MG Oral Tab; Take 1 tablet by mouth 2 (two) times daily. Dispense: 180 tablet; Refill: 1    3. Cyst of right ovary      4. Irritability    - Sertraline HCl 50 MG Oral Tab; Take 1 tablet (50 mg total) by mouth daily.   Dispense: 90 tab

## 2019-03-22 RX ORDER — FUROSEMIDE 20 MG/1
20 TABLET ORAL 2 TIMES DAILY
Status: CANCELLED | OUTPATIENT
Start: 2019-03-22

## 2019-03-25 ENCOUNTER — OFFICE VISIT (OUTPATIENT)
Dept: INTERNAL MEDICINE CLINIC | Facility: CLINIC | Age: 44
End: 2019-03-25
Payer: COMMERCIAL

## 2019-03-25 VITALS
HEART RATE: 76 BPM | SYSTOLIC BLOOD PRESSURE: 102 MMHG | HEIGHT: 62 IN | RESPIRATION RATE: 12 BRPM | WEIGHT: 202 LBS | BODY MASS INDEX: 37.17 KG/M2 | TEMPERATURE: 97 F | DIASTOLIC BLOOD PRESSURE: 70 MMHG

## 2019-03-25 DIAGNOSIS — R74.8 ELEVATED ALKALINE PHOSPHATASE LEVEL: ICD-10-CM

## 2019-03-25 DIAGNOSIS — Z00.00 PHYSICAL EXAM, ANNUAL: Primary | ICD-10-CM

## 2019-03-25 DIAGNOSIS — R06.83 SNORING: ICD-10-CM

## 2019-03-25 DIAGNOSIS — G25.81 RESTLESS LEG: ICD-10-CM

## 2019-03-25 DIAGNOSIS — Z23 NEED FOR VACCINATION: ICD-10-CM

## 2019-03-25 DIAGNOSIS — J45.30 MILD PERSISTENT ASTHMA WITHOUT COMPLICATION: Chronic | ICD-10-CM

## 2019-03-25 PROCEDURE — 99396 PREV VISIT EST AGE 40-64: CPT | Performed by: INTERNAL MEDICINE

## 2019-03-25 RX ORDER — ALBUTEROL SULFATE 0.75 MG/3ML
SOLUTION RESPIRATORY (INHALATION)
COMMUNITY
End: 2019-05-31

## 2019-03-25 NOTE — PROGRESS NOTES
[facility]    CHIEF COMPLAINT: Patient presents with:  Routine Physical: 6/24/13-pap.   1/15/19-mammogram        HPI:   Hanny Tovar is a 37year old female who presents for a complete physical exam.      Cough and asthma symptoms are bett (six) hours as needed for Pain. Disp: 40 tablet Rfl: 0   Meclizine HCl 25 MG Oral Tab Take 1 tablet (25 mg total) by mouth 3 (three) times daily as needed.  (Patient taking differently: Take by mouth 3 (three) times daily as needed.  ) Disp: 90 tablet Rfl: EGD     • ORAL SURGERY PROCEDURE      wisdom teeth   • OTHER SURGICAL HISTORY      bunion   • OTHER SURGICAL HISTORY      knee left scope  not acl   • OTHER SURGICAL HISTORY  09/15/09    colpo    • PACEMAKER/DEFIBRILLATOR     • REMOVAL OF OVARIAN CYST(S) dysuria, vaginal discharge or itching,periods regular   MUSCULOSKELETAL: denies back pain  NEURO: denies headaches  PSYCHE: denies depression or anxiety  HEMATOLOGIC: denies hx of anemia  ENDOCRINE: denies thyroid history  ALL/ASTHMA: denies hx of allergy TRIG 80 02/25/2019     05/08/2018 09:09 AM    NONHDLC 127 05/08/2018 09:09 AM       No results found for: A1C   Vitamin D:    No results found for: VITD        ASSESSMENT AND PLAN:   Collins Anderson is a 37year old female who presents for a co

## 2019-04-02 ENCOUNTER — MED REC SCAN ONLY (OUTPATIENT)
Dept: INTERNAL MEDICINE CLINIC | Facility: CLINIC | Age: 44
End: 2019-04-02

## 2019-04-09 ENCOUNTER — APPOINTMENT (OUTPATIENT)
Dept: CARDIOLOGY | Age: 44
End: 2019-04-09
Attending: INTERNAL MEDICINE

## 2019-04-29 DIAGNOSIS — J45.30 MILD PERSISTENT ASTHMA WITHOUT COMPLICATION: Chronic | ICD-10-CM

## 2019-04-30 RX ORDER — FLUTICASONE PROPIONATE AND SALMETEROL 232; 14 UG/1; UG/1
POWDER, METERED RESPIRATORY (INHALATION)
Qty: 1 EACH | Refills: 1 | Status: SHIPPED | OUTPATIENT
Start: 2019-04-30 | End: 2019-07-08

## 2019-05-10 RX ORDER — BUPROPION HYDROCHLORIDE 150 MG/1
150 TABLET ORAL DAILY
Qty: 30 TABLET | Refills: 1 | Status: SHIPPED | OUTPATIENT
Start: 2019-05-10 | End: 2019-06-14

## 2019-05-10 NOTE — TELEPHONE ENCOUNTER
From: Gerard Cooley  To: MARCELL Flores  Sent: 3/21/2019 3:53 PM CDT  Subject: Prescription Question    Moe Simons,     Thank you for meeting with me this morning!  I had a feeling to check online and verify if I could take the new medicin

## 2019-05-16 ENCOUNTER — HOSPITAL ENCOUNTER (OUTPATIENT)
Dept: ULTRASOUND IMAGING | Facility: HOSPITAL | Age: 44
Discharge: HOME | End: 2019-05-16
Attending: INTERNAL MEDICINE
Payer: COMMERCIAL

## 2019-05-16 DIAGNOSIS — R22.42 LUMP OF LEFT THIGH: ICD-10-CM

## 2019-05-16 PROCEDURE — 76881 US COMPL JOINT R-T W/IMG: CPT | Performed by: INTERNAL MEDICINE

## 2019-05-17 NOTE — PROGRESS NOTES
Spoke to pt via , aware of results & recommendations. Pt voiced understanding. Scheduled for evaluation on 5/30/19 at 8:20AM. Will need  since pt is deaf. Routed to front.

## 2019-05-20 DIAGNOSIS — J45.20 MILD INTERMITTENT ASTHMA WITHOUT COMPLICATION: ICD-10-CM

## 2019-05-22 DIAGNOSIS — J45.20 MILD INTERMITTENT ASTHMA WITHOUT COMPLICATION: ICD-10-CM

## 2019-05-22 RX ORDER — MONTELUKAST SODIUM 10 MG/1
TABLET ORAL
Qty: 90 TABLET | Refills: 1 | Status: SHIPPED | OUTPATIENT
Start: 2019-05-22 | End: 2019-08-23

## 2019-05-23 RX ORDER — MONTELUKAST SODIUM 10 MG/1
TABLET ORAL
Qty: 90 TABLET | Refills: 0 | OUTPATIENT
Start: 2019-05-23

## 2019-05-31 ENCOUNTER — OFFICE VISIT (OUTPATIENT)
Dept: INTERNAL MEDICINE CLINIC | Facility: CLINIC | Age: 44
End: 2019-05-31
Payer: COMMERCIAL

## 2019-05-31 VITALS
SYSTOLIC BLOOD PRESSURE: 90 MMHG | BODY MASS INDEX: 36.56 KG/M2 | HEART RATE: 76 BPM | TEMPERATURE: 98 F | HEIGHT: 62 IN | DIASTOLIC BLOOD PRESSURE: 60 MMHG | WEIGHT: 198.69 LBS | RESPIRATION RATE: 12 BRPM

## 2019-05-31 DIAGNOSIS — M25.572 CHRONIC PAIN OF LEFT ANKLE: Primary | ICD-10-CM

## 2019-05-31 DIAGNOSIS — G89.29 CHRONIC PAIN OF LEFT ANKLE: Primary | ICD-10-CM

## 2019-05-31 PROCEDURE — 99213 OFFICE O/P EST LOW 20 MIN: CPT | Performed by: INTERNAL MEDICINE

## 2019-05-31 RX ORDER — TRAMADOL HYDROCHLORIDE 50 MG/1
50 TABLET ORAL EVERY 6 HOURS PRN
Qty: 40 TABLET | Refills: 0 | Status: CANCELLED
Start: 2019-05-31

## 2019-05-31 NOTE — PROGRESS NOTES
[facility]    CHIEF COMPLAINT:  Patient presents with:  Test Results: 6/24/13-pap. 1/25/19-mammo        HISTORY OF PRESENT ILLNESS:  Still with left leg and ankle pain. Had seen me for this in 12/2018.    Had leg ultrasound done which showed a f Meclizine HCl 25 MG Oral Tab Take 1 tablet (25 mg total) by mouth 3 (three) times daily as needed.  (Patient taking differently: Take by mouth 3 (three) times daily as needed.  ) Disp: 90 tablet Rfl: 0   Phenytoin Sodium Extended 100 MG Oral Cap Take 400 None Seen None Seen    Squamous Epi.  Cells Moderate (A) Small /LPF    Renal Tubular Epithelial Cells None Seen Small /LPF    Transitional Cells None Seen Small /LPF    Mucous Urine 1+ (A) None Seen    Yeast Urine None Seen None Seen   COMP METABOLIC PANEL x10(3) uL    Monocyte Absolute 0.70 0.10 - 1.00 x10(3) uL    Eosinophil Absolute 0.42 0.00 - 0.70 x10(3) uL    Basophil Absolute 0.05 0.00 - 0.20 x10(3) uL    Immature Granulocyte Absolute 0.01 0.00 - 1.00 x10(3) uL    Neutrophil % 53.2 %    Lymphocyte % 2

## 2019-06-04 DIAGNOSIS — I10 HYPERTENSION, UNSPECIFIED TYPE: Primary | ICD-10-CM

## 2019-06-04 RX ORDER — PROPRANOLOL HYDROCHLORIDE 160 MG/1
640 CAPSULE, EXTENDED RELEASE ORAL DAILY
Qty: 120 CAPSULE | Refills: 7 | Status: SHIPPED | OUTPATIENT
Start: 2019-06-04 | End: 2019-06-04 | Stop reason: SDUPTHER

## 2019-06-04 RX ORDER — PROPRANOLOL HYDROCHLORIDE 160 MG/1
640 CAPSULE, EXTENDED RELEASE ORAL DAILY
Qty: 120 CAPSULE | Refills: 7 | Status: SHIPPED | OUTPATIENT
Start: 2019-06-04

## 2019-06-14 ENCOUNTER — OFFICE VISIT (OUTPATIENT)
Dept: OBGYN CLINIC | Facility: CLINIC | Age: 44
End: 2019-06-14
Payer: COMMERCIAL

## 2019-06-14 VITALS
DIASTOLIC BLOOD PRESSURE: 64 MMHG | WEIGHT: 196 LBS | HEIGHT: 62 IN | RESPIRATION RATE: 16 BRPM | BODY MASS INDEX: 36.07 KG/M2 | HEART RATE: 80 BPM | SYSTOLIC BLOOD PRESSURE: 96 MMHG

## 2019-06-14 DIAGNOSIS — N94.19 DYSPAREUNIA DUE TO MEDICAL CONDITION IN FEMALE: ICD-10-CM

## 2019-06-14 DIAGNOSIS — R45.86 EMOTIONAL LABILITY: ICD-10-CM

## 2019-06-14 DIAGNOSIS — R45.4 IRRITABILITY: ICD-10-CM

## 2019-06-14 DIAGNOSIS — N80.9 ENDOMETRIOSIS: Primary | ICD-10-CM

## 2019-06-14 PROCEDURE — 99213 OFFICE O/P EST LOW 20 MIN: CPT | Performed by: NURSE PRACTITIONER

## 2019-06-14 RX ORDER — BUPROPION HYDROCHLORIDE 150 MG/1
150 TABLET ORAL DAILY
Qty: 90 TABLET | Refills: 1 | Status: SHIPPED | OUTPATIENT
Start: 2019-06-14 | End: 2019-09-03

## 2019-06-14 NOTE — PROGRESS NOTES
Anita Andrade is a 37year old female. Patient's last menstrual period was 03/07/2019 (exact date). Patient presents with:   Other: mediacatons      HPI:    Pt is present today (with  present) to discuss her Wellbutrin dosing and O Seizure disorder ([facility] 11/2017    last Sezure 2017   • Shortness of breath    • Visual impairment     glasses   • Weight loss      Past Surgical History:   Procedure Laterality Date   • CARDIAC DEFIBRILLATOR PLACEMENT  2015    dual chamber-[facility] results. Refill of Wellbutrin sent to pharmacy. Pt will call if she feels the dose is no long effective. Pt aware of what to do if feeling suicidal (call 911 or seek immediate medical attention).      Diagnoses and all orders for this visit:    Laquetta Hamman

## 2019-06-17 ENCOUNTER — HOSPITAL ENCOUNTER (OUTPATIENT)
Dept: ULTRASOUND IMAGING | Facility: HOSPITAL | Age: 44
Discharge: HOME | End: 2019-06-17
Attending: INTERNAL MEDICINE
Payer: COMMERCIAL

## 2019-06-17 DIAGNOSIS — R60.0 LEG EDEMA, LEFT: ICD-10-CM

## 2019-06-17 PROCEDURE — 93971 EXTREMITY STUDY: CPT | Performed by: INTERNAL MEDICINE

## 2019-06-27 ENCOUNTER — TELEPHONE (OUTPATIENT)
Dept: INTERNAL MEDICINE CLINIC | Facility: CLINIC | Age: 44
End: 2019-06-27

## 2019-06-27 NOTE — TELEPHONE ENCOUNTER
Patient woke up with ankle pain and is wondering what she should do or where she should go. She would prefer to communicate through [address]

## 2019-06-28 NOTE — TELEPHONE ENCOUNTER
Can she get in sooner with dr. Fox Cui? Maybe early next week? Otherwise can try dr. Evelin Alejandre who is ortho. Keep kegs elevated. Use an ace wrap to give support to the ankle. Try to avoid staying on it for too long. Try ice.    However if has persi

## 2019-06-28 NOTE — TELEPHONE ENCOUNTER
Patient with worsening of left ankle pain, states was 10/10 when she woke up yesterday and felt very \"tight\". States pain subsided slightly with massage and she was able to carry on with her day. States she has an appointment with Dr. Lori Enriquez July 8th.  Franco Fonseca

## 2019-07-02 ENCOUNTER — TELEPHONE (OUTPATIENT)
Dept: INTERNAL MEDICINE CLINIC | Facility: CLINIC | Age: 44
End: 2019-07-02

## 2019-07-02 NOTE — TELEPHONE ENCOUNTER
Patient is wondering what her blood type is. She believes she has talked about this with Dr Julia Lanier in the past.  If she cannot be reached by phone, please send her a message via [employer] with the information. Thank you!

## 2019-07-05 ENCOUNTER — TELEPHONE (OUTPATIENT)
Dept: CARDIOLOGY | Age: 44
End: 2019-07-05

## 2019-07-08 ENCOUNTER — OFFICE VISIT (OUTPATIENT)
Dept: PODIATRY CLINIC | Facility: CLINIC | Age: 44
End: 2019-07-08
Payer: COMMERCIAL

## 2019-07-08 ENCOUNTER — HOSPITAL ENCOUNTER (OUTPATIENT)
Dept: GENERAL RADIOLOGY | Age: 44
Discharge: HOME | End: 2019-07-08
Attending: PODIATRIST
Payer: COMMERCIAL

## 2019-07-08 DIAGNOSIS — M76.72 PERONEAL TENDINITIS OF LOWER LEG, LEFT: ICD-10-CM

## 2019-07-08 DIAGNOSIS — S93.492A SPRAIN OF ANTERIOR TALOFIBULAR LIGAMENT OF LEFT ANKLE, INITIAL ENCOUNTER: ICD-10-CM

## 2019-07-08 DIAGNOSIS — J45.30 MILD PERSISTENT ASTHMA WITHOUT COMPLICATION: Chronic | ICD-10-CM

## 2019-07-08 DIAGNOSIS — S93.492A SPRAIN OF ANTERIOR TALOFIBULAR LIGAMENT OF LEFT ANKLE, INITIAL ENCOUNTER: Primary | ICD-10-CM

## 2019-07-08 PROCEDURE — 99203 OFFICE O/P NEW LOW 30 MIN: CPT | Performed by: PODIATRIST

## 2019-07-08 PROCEDURE — 73610 X-RAY EXAM OF ANKLE: CPT | Performed by: PODIATRIST

## 2019-07-08 RX ORDER — FLUTICASONE PROPIONATE AND SALMETEROL 232; 14 UG/1; UG/1
POWDER, METERED RESPIRATORY (INHALATION)
Qty: 1 EACH | Refills: 1 | Status: SHIPPED | OUTPATIENT
Start: 2019-07-08 | End: 2019-09-12

## 2019-07-08 NOTE — PROGRESS NOTES
Raymond Wilson is a 37year old female. Patient presents with:  Consult: injured left ankle last year. pt's heart stopped august 5 2018 and leg caught in chair as they were bringing her to the ground for resustation.  pt has no fx on xrays, has done P (150 mg total) by mouth daily. Disp: 90 tablet Rfl: 1   MONTELUKAST SODIUM 10 MG Oral Tab TAKE 1 TABLET(10 MG) BY MOUTH EVERY DAY Disp: 90 tablet Rfl: 1   Elagolix Sodium (ORILISSA) 200 MG Oral Tab Take 1 tablet by mouth 2 (two) times daily.  Disp: 180 tabl • Enlarged thyroid    • Gastric ulcer, unspecified as acute or chronic, without mention of hemorrhage or perforation    • Hearing impairment     deaf;sign language communication   • Hearing loss    • Heart palpitations    • Heavy menses    • High blood p Other (Other) Maternal Grandfather         Stroke   • Asthma Brother    • Other (Other) Paternal Grandmother         ephzema   • Hypertension Brother    • Stroke Maternal Grandfather          approx 65      Social History    Socioeconomic History Neurologic: The patient has pain sensation intact   4.  Musculoskeletal: The patient has most of her pain on palpation of the anterior talofibular ligament and the peroneal tendons and the posterior aspect of the ankle which extends distally to the tubercle

## 2019-07-19 ENCOUNTER — TELEPHONE (OUTPATIENT)
Dept: CARDIOLOGY | Age: 44
End: 2019-07-19

## 2019-07-24 ENCOUNTER — HOSPITAL ENCOUNTER (EMERGENCY)
Facility: HOSPITAL | Age: 44
Discharge: HOME | End: 2019-07-25
Payer: COMMERCIAL

## 2019-07-24 ENCOUNTER — APPOINTMENT (OUTPATIENT)
Dept: CT IMAGING | Facility: HOSPITAL | Age: 44
End: 2019-07-24
Payer: COMMERCIAL

## 2019-07-24 VITALS
RESPIRATION RATE: 16 BRPM | DIASTOLIC BLOOD PRESSURE: 72 MMHG | HEART RATE: 82 BPM | SYSTOLIC BLOOD PRESSURE: 110 MMHG | WEIGHT: 194 LBS | BODY MASS INDEX: 34.37 KG/M2 | OXYGEN SATURATION: 99 % | TEMPERATURE: 99 F | HEIGHT: 63 IN

## 2019-07-24 DIAGNOSIS — R10.9 ABDOMINAL PAIN, ACUTE: ICD-10-CM

## 2019-07-24 DIAGNOSIS — R19.00 PELVIC MASS: ICD-10-CM

## 2019-07-24 DIAGNOSIS — K92.2 LOWER GI BLEED: Primary | ICD-10-CM

## 2019-07-24 LAB
ALBUMIN SERPL-MCNC: 3.6 G/DL (ref 3.4–5)
ALBUMIN/GLOB SERPL: 0.9 {RATIO} (ref 1–2)
ALP LIVER SERPL-CCNC: 141 U/L (ref 37–98)
ALT SERPL-CCNC: 35 U/L (ref 13–56)
ANION GAP SERPL CALC-SCNC: 6 MMOL/L (ref 0–18)
AST SERPL-CCNC: 23 U/L (ref 15–37)
BASOPHILS # BLD AUTO: 0.04 X10(3) UL (ref 0–0.2)
BASOPHILS NFR BLD AUTO: 0.7 %
BILIRUB SERPL-MCNC: 0.4 MG/DL (ref 0.1–2)
BUN BLD-MCNC: 10 MG/DL (ref 7–18)
BUN/CREAT SERPL: 13.5 (ref 10–20)
CALCIUM BLD-MCNC: 8.8 MG/DL (ref 8.5–10.1)
CHLORIDE SERPL-SCNC: 102 MMOL/L (ref 98–112)
CO2 SERPL-SCNC: 29 MMOL/L (ref 21–32)
CREAT BLD-MCNC: 0.74 MG/DL (ref 0.55–1.02)
DEPRECATED RDW RBC AUTO: 43.2 FL (ref 35.1–46.3)
EOSINOPHIL # BLD AUTO: 0.43 X10(3) UL (ref 0–0.7)
EOSINOPHIL NFR BLD AUTO: 7.7 %
ERYTHROCYTE [DISTWIDTH] IN BLOOD BY AUTOMATED COUNT: 13.1 % (ref 11–15)
GLOBULIN PLAS-MCNC: 4 G/DL (ref 2.8–4.4)
GLUCOSE BLD-MCNC: 92 MG/DL (ref 70–99)
HCG SERPL QL: NEGATIVE
HCT VFR BLD AUTO: 39.6 % (ref 35–48)
HGB BLD-MCNC: 12.9 G/DL (ref 12–16)
IMM GRANULOCYTES # BLD AUTO: 0.01 X10(3) UL (ref 0–1)
IMM GRANULOCYTES NFR BLD: 0.2 %
INR BLD: 1.04 (ref 0.9–1.1)
LYMPHOCYTES # BLD AUTO: 2.23 X10(3) UL (ref 1–4)
LYMPHOCYTES NFR BLD AUTO: 40 %
M PROTEIN MFR SERPL ELPH: 7.6 G/DL (ref 6.4–8.2)
MCH RBC QN AUTO: 29.5 PG (ref 26–34)
MCHC RBC AUTO-ENTMCNC: 32.6 G/DL (ref 31–37)
MCV RBC AUTO: 90.6 FL (ref 80–100)
MONOCYTES # BLD AUTO: 0.68 X10(3) UL (ref 0.1–1)
MONOCYTES NFR BLD AUTO: 12.2 %
NEUTROPHILS # BLD AUTO: 2.19 X10 (3) UL (ref 1.5–7.7)
NEUTROPHILS # BLD AUTO: 2.19 X10(3) UL (ref 1.5–7.7)
NEUTROPHILS NFR BLD AUTO: 39.2 %
OSMOLALITY SERPL CALC.SUM OF ELEC: 283 MOSM/KG (ref 275–295)
PLATELET # BLD AUTO: 233 10(3)UL (ref 150–450)
POTASSIUM SERPL-SCNC: 4.2 MMOL/L (ref 3.5–5.1)
PSA SERPL DL<=0.01 NG/ML-MCNC: 14 SECONDS (ref 12.5–14.7)
RBC # BLD AUTO: 4.37 X10(6)UL (ref 3.8–5.3)
SODIUM SERPL-SCNC: 137 MMOL/L (ref 136–145)
WBC # BLD AUTO: 5.6 X10(3) UL (ref 4–11)

## 2019-07-24 PROCEDURE — 99284 EMERGENCY DEPT VISIT MOD MDM: CPT

## 2019-07-24 PROCEDURE — 96374 THER/PROPH/DIAG INJ IV PUSH: CPT

## 2019-07-24 PROCEDURE — 74177 CT ABD & PELVIS W/CONTRAST: CPT

## 2019-07-24 PROCEDURE — 85610 PROTHROMBIN TIME: CPT | Performed by: PHYSICIAN ASSISTANT

## 2019-07-24 PROCEDURE — 96375 TX/PRO/DX INJ NEW DRUG ADDON: CPT

## 2019-07-24 PROCEDURE — 80053 COMPREHEN METABOLIC PANEL: CPT | Performed by: PHYSICIAN ASSISTANT

## 2019-07-24 PROCEDURE — 84703 CHORIONIC GONADOTROPIN ASSAY: CPT

## 2019-07-24 PROCEDURE — 85025 COMPLETE CBC W/AUTO DIFF WBC: CPT | Performed by: PHYSICIAN ASSISTANT

## 2019-07-24 RX ORDER — HYDROMORPHONE HYDROCHLORIDE 1 MG/ML
0.5 INJECTION, SOLUTION INTRAMUSCULAR; INTRAVENOUS; SUBCUTANEOUS ONCE
Status: COMPLETED | OUTPATIENT
Start: 2019-07-24 | End: 2019-07-24

## 2019-07-24 RX ORDER — METOCLOPRAMIDE HYDROCHLORIDE 5 MG/ML
5 INJECTION INTRAMUSCULAR; INTRAVENOUS ONCE
Status: COMPLETED | OUTPATIENT
Start: 2019-07-24 | End: 2019-07-24

## 2019-07-24 RX ORDER — ONDANSETRON 4 MG/1
4 TABLET, ORALLY DISINTEGRATING ORAL ONCE
Status: DISCONTINUED | OUTPATIENT
Start: 2019-07-24 | End: 2019-07-24

## 2019-07-25 NOTE — ED INITIAL ASSESSMENT (HPI)
Pt to ED with complaints of anal bleeding starting today while at work. Pt denies any dizziness, reports she feels nauseated. Pt is deaf, communicates using written notes.

## 2019-07-25 NOTE — ED NOTES
Language line  via Ipad sign language used So MD could question and examine the patient. RN in the room with MD for the examination.

## 2019-07-25 NOTE — ED NOTES
I was about 4 steps behind registration and watched her go in the room  the communication clip board we have been using and start to read so she could begin registering the patient.  As I made it through the door way a man (who was not in the room pr

## 2019-07-25 NOTE — ED PROVIDER NOTES
Patient Seen in: [facility] Emergency Department    History   Patient presents with:  Anal Problem (GI)    Stated Complaint: anal bleeding, pt is deaf     HPI    Patient presents the ER with some bleeding per rectum that she noticed this evening.   Daniel Kramer PLACEMENT  1997   • COLONOSCOPY N/A 9/7/2018    Performed by Sil Ewing DO at [facility] ENDOSCOPY   • CU (CHRONIC URTICARIA) INDEX     • EGD     • ORAL SURGERY PROCEDURE      wisdom teeth   • OTHER SURGICAL HISTORY      bunion   • OTHER SURGICAL HISTORY tenderness throughout the abdomen.     Perirectal and perineal exam performed with the nurse at the bedside and reveals unremarkable external examination without any hemorrhoids, without any fissure, mass, or rash      ED Course     Labs Reviewed   COMP MET Approved by (CST):  Niru Cochran MD on 7/08/2019 at 19:31          Ct Abdomen Pelvis Iv Contrast, No Oral (er)    Result Date: 7/24/2019  PROCEDURE:  CT ABDOMEN PELVIS IV CONTRAST, NO ORAL (ER)  COMPARISON:  [facility] CT ABDOMEN+PELVIS KIDNEYSTONE 2D functional.  Mild increase in size. A pelvic ultrasound could offer additional assessment. 2. There is no new colonic inflammation. The perirectal and ischial fat planes appear normal. 3. Details as above. Continued clinical correlation recommended. consultation    Tiesha Navarrete, [address]  Joy Mejia [phone]    Call in 1 day  For office consultation for your left pelvic mass.         Medications Prescribed:  Current Discharge Medication List

## 2019-07-29 PROBLEM — I49.5 SICK SINUS SYNDROME (HCC): Status: ACTIVE | Noted: 2019-07-29

## 2019-07-29 PROBLEM — I49.01 VENTRICULAR FIBRILLATION (HCC): Status: ACTIVE | Noted: 2019-07-29

## 2019-07-29 PROBLEM — Z95.810 AUTOMATIC IMPLANTABLE CARDIOVERTER-DEFIBRILLATOR IN SITU: Status: ACTIVE | Noted: 2019-07-29

## 2019-07-29 PROBLEM — Z45.02 IMPLANTABLE CARDIOVERTER-DEFIBRILLATOR DISCHARGE: Status: ACTIVE | Noted: 2019-07-29

## 2019-08-05 DIAGNOSIS — G40.909 SEIZURE DISORDER (HCC): Chronic | ICD-10-CM

## 2019-08-05 RX ORDER — PHENYTOIN SODIUM 100 MG/1
CAPSULE, EXTENDED RELEASE ORAL
Qty: 120 CAPSULE | Refills: 5 | Status: SHIPPED | OUTPATIENT
Start: 2019-08-05

## 2019-08-05 NOTE — TELEPHONE ENCOUNTER
Medication: Phenytoin 100 mg    Date of last refill: 07/19/18 with 11 addt refills  [place]     Last office visit: 2/28/2019  Due back to clinic per last office note:  RTN in 6-8 months  Date next office visit scheduled:

## 2019-08-22 DIAGNOSIS — J45.20 MILD INTERMITTENT ASTHMA WITHOUT COMPLICATION: ICD-10-CM

## 2019-08-23 RX ORDER — MONTELUKAST SODIUM 10 MG/1
TABLET ORAL
Qty: 90 TABLET | Refills: 0 | Status: SHIPPED | OUTPATIENT
Start: 2019-08-23 | End: 2019-11-20

## 2019-08-30 ENCOUNTER — TELEPHONE (OUTPATIENT)
Dept: OBGYN CLINIC | Facility: CLINIC | Age: 44
End: 2019-08-30

## 2019-08-30 DIAGNOSIS — N94.19 DYSPAREUNIA DUE TO MEDICAL CONDITION IN FEMALE: ICD-10-CM

## 2019-08-30 DIAGNOSIS — N80.9 ENDOMETRIOSIS: ICD-10-CM

## 2019-08-30 DIAGNOSIS — R10.2 PELVIC PAIN: ICD-10-CM

## 2019-08-30 RX ORDER — ELAGOLIX 200 MG/1
TABLET, FILM COATED ORAL
Qty: 60 TABLET | Refills: 0 | Status: SHIPPED | OUTPATIENT
Start: 2019-08-30 | End: 2019-09-03 | Stop reason: DRUGHIGH

## 2019-08-30 NOTE — TELEPHONE ENCOUNTER
Left a message reminding patient of her appointment, please let us know if unable to keep appointment,

## 2019-09-03 ENCOUNTER — OFFICE VISIT (OUTPATIENT)
Dept: OBGYN CLINIC | Facility: CLINIC | Age: 44
End: 2019-09-03
Payer: COMMERCIAL

## 2019-09-03 VITALS
HEIGHT: 62 IN | HEART RATE: 80 BPM | RESPIRATION RATE: 12 BRPM | DIASTOLIC BLOOD PRESSURE: 82 MMHG | SYSTOLIC BLOOD PRESSURE: 118 MMHG | TEMPERATURE: 98 F | WEIGHT: 246 LBS | BODY MASS INDEX: 45.27 KG/M2

## 2019-09-03 DIAGNOSIS — N83.202 CYST OF LEFT OVARY: ICD-10-CM

## 2019-09-03 DIAGNOSIS — R45.4 IRRITABILITY: ICD-10-CM

## 2019-09-03 DIAGNOSIS — N80.9 ENDOMETRIOSIS: Primary | ICD-10-CM

## 2019-09-03 PROCEDURE — 99213 OFFICE O/P EST LOW 20 MIN: CPT | Performed by: NURSE PRACTITIONER

## 2019-09-03 RX ORDER — BUPROPION HYDROCHLORIDE 300 MG/1
300 TABLET ORAL DAILY
Qty: 90 TABLET | Refills: 1 | Status: SHIPPED | OUTPATIENT
Start: 2019-09-03 | End: 2020-02-17

## 2019-09-03 RX ORDER — LANOLIN ALCOHOL/MO/W.PET/CERES
400 CREAM (GRAM) TOPICAL 2 TIMES DAILY
Qty: 180 TABLET | Refills: 1 | Status: SHIPPED | OUTPATIENT
Start: 2019-09-03

## 2019-09-03 NOTE — PROGRESS NOTES
Pt states she is here for ER follow up. She had a CT scan which indicated an ovarian cyst became larger. She was advised by ER doc to follow up with gyne.

## 2019-09-03 NOTE — PROGRESS NOTES
Colby Labrum is a 37year old female. No LMP recorded (approximate). Patient is perimenopausal.  Patient presents with: Other: Er follow up     and pt's  present for visit.    HPI:    Pt presents for Franca Real f/u and ED f/u sweats    • Osteoarthritis    • Pacemaker    • Pain in joints    • Pain with bowel movements    • Prolonged QT interval syndrome    • [facility]    last Sezure 2017   • Shortness of breath    • Sleep apnea    • Sleep disturbance    • [facility] her Wellbutrin dose increased to see if this helps with her \"mood swings\". Will increase dose and pt to f/u in 6 months or sooner if needed.      Diagnoses and all orders for this visit:    Endometriosis    Cyst of left ovary    Irritability      20 minut Dressing: bandage

## 2019-09-07 ENCOUNTER — HOSPITAL ENCOUNTER (OUTPATIENT)
Age: 44
Discharge: HOME | End: 2019-09-07
Attending: FAMILY MEDICINE
Payer: COMMERCIAL

## 2019-09-07 ENCOUNTER — APPOINTMENT (OUTPATIENT)
Dept: GENERAL RADIOLOGY | Age: 44
End: 2019-09-07
Attending: FAMILY MEDICINE
Payer: COMMERCIAL

## 2019-09-07 VITALS
RESPIRATION RATE: 18 BRPM | HEART RATE: 83 BPM | DIASTOLIC BLOOD PRESSURE: 81 MMHG | OXYGEN SATURATION: 98 % | SYSTOLIC BLOOD PRESSURE: 126 MMHG | TEMPERATURE: 97 F

## 2019-09-07 DIAGNOSIS — S80.12XA CONTUSION OF LEFT KNEE AND LOWER LEG, INITIAL ENCOUNTER: Primary | ICD-10-CM

## 2019-09-07 DIAGNOSIS — S80.02XA CONTUSION OF LEFT KNEE AND LOWER LEG, INITIAL ENCOUNTER: Primary | ICD-10-CM

## 2019-09-07 DIAGNOSIS — S93.402A SPRAIN OF LEFT ANKLE, UNSPECIFIED LIGAMENT, INITIAL ENCOUNTER: ICD-10-CM

## 2019-09-07 DIAGNOSIS — J01.90 ACUTE NON-RECURRENT SINUSITIS, UNSPECIFIED LOCATION: ICD-10-CM

## 2019-09-07 PROCEDURE — 99214 OFFICE O/P EST MOD 30 MIN: CPT

## 2019-09-07 PROCEDURE — 99213 OFFICE O/P EST LOW 20 MIN: CPT

## 2019-09-07 PROCEDURE — 73560 X-RAY EXAM OF KNEE 1 OR 2: CPT | Performed by: FAMILY MEDICINE

## 2019-09-07 PROCEDURE — 73610 X-RAY EXAM OF ANKLE: CPT | Performed by: FAMILY MEDICINE

## 2019-09-07 RX ORDER — AMOXICILLIN AND CLAVULANATE POTASSIUM 875; 125 MG/1; MG/1
1 TABLET, FILM COATED ORAL 2 TIMES DAILY
Qty: 20 TABLET | Refills: 0 | Status: SHIPPED | OUTPATIENT
Start: 2019-09-07 | End: 2019-09-17

## 2019-09-07 RX ORDER — ARM BRACE
EACH MISCELLANEOUS
Qty: 1 EACH | Refills: 0 | Status: SHIPPED | OUTPATIENT
Start: 2019-09-07 | End: 2021-03-01

## 2019-09-07 RX ORDER — ACETAMINOPHEN 500 MG
1000 TABLET ORAL ONCE
Status: COMPLETED | OUTPATIENT
Start: 2019-09-07 | End: 2019-09-07

## 2019-09-07 RX ORDER — ACETAMINOPHEN 500 MG
500 TABLET ORAL ONCE
Status: DISCONTINUED | OUTPATIENT
Start: 2019-09-07 | End: 2019-09-07

## 2019-09-07 NOTE — ED INITIAL ASSESSMENT (HPI)
Patient is here for 2 reasons- fell on Thursday night and landed on left knee and injured left ankle. C/o swelling and bruising. Also c/o possible sinus infection. C/o increased mucous, stomach ache and headache.

## 2019-09-09 ENCOUNTER — TELEPHONE (OUTPATIENT)
Dept: INTERNAL MEDICINE CLINIC | Facility: CLINIC | Age: 44
End: 2019-09-09

## 2019-09-09 NOTE — ED PROVIDER NOTES
Patient Seen in: [facility]    History   Patient presents with:  Fall (musculoskeletal, neurologic)  Cough/URI    Stated Complaint: had bad fall on Thurs has swelling on knee and bruising , and has sinus infecti*    [facility]    last Sezure 2017   • Shortness of breath    • Sleep apnea    • Sleep disturbance    • Stress    • Visual impairment     glasses   • Weight loss               Past Surgical History:   Procedure Laterality Date   • CARDIAC DEFIBRILLATOR PLAC well-nourished. HENT:   Head: Normocephalic and atraumatic. Right Ear: External ear normal.   Left Ear: External ear normal.   Nose: Nose normal.   Mouth/Throat: Oropharynx is clear and moist.   Eyes: Pupils are equal, round, and reactive to light.  [address] [address] [phone]    In 1 week          Medications Prescribed:  Discharge Medication List as of 9/7/2019 12:58 PM    START taking these medications    Elastic Bandages & Supports (ACE ANKLE BRACE) Does not apply Misc  Please prov

## 2019-09-09 NOTE — TELEPHONE ENCOUNTER
Patient called, she was seen on 9/7/19 at [facility]. Patients ankle is still bothering her, patient is going out of town on 9/13/19---9/23/19. Scheduled the F/U on 9/12/19 at 11:00 AM, this is before the week because patient is going out of town.  * re

## 2019-09-09 NOTE — TELEPHONE ENCOUNTER
FYI only. Pt was very insistent on being see sooner rather than later which is why appt was made before 1 week. IC notes in Epic.

## 2019-09-12 ENCOUNTER — OFFICE VISIT (OUTPATIENT)
Dept: INTERNAL MEDICINE CLINIC | Facility: CLINIC | Age: 44
End: 2019-09-12
Payer: COMMERCIAL

## 2019-09-12 VITALS
WEIGHT: 200.19 LBS | DIASTOLIC BLOOD PRESSURE: 60 MMHG | TEMPERATURE: 98 F | RESPIRATION RATE: 16 BRPM | OXYGEN SATURATION: 98 % | BODY MASS INDEX: 36.84 KG/M2 | SYSTOLIC BLOOD PRESSURE: 104 MMHG | HEART RATE: 85 BPM | HEIGHT: 62 IN

## 2019-09-12 DIAGNOSIS — J45.30 MILD PERSISTENT ASTHMA WITHOUT COMPLICATION: Chronic | ICD-10-CM

## 2019-09-12 DIAGNOSIS — R60.0 LEG EDEMA, LEFT: ICD-10-CM

## 2019-09-12 DIAGNOSIS — S82.892D CLOSED FRACTURE OF LEFT ANKLE WITH ROUTINE HEALING, SUBSEQUENT ENCOUNTER: ICD-10-CM

## 2019-09-12 PROCEDURE — 99214 OFFICE O/P EST MOD 30 MIN: CPT | Performed by: INTERNAL MEDICINE

## 2019-09-12 RX ORDER — FLUTICASONE PROPIONATE AND SALMETEROL 232; 14 UG/1; UG/1
1 POWDER, METERED RESPIRATORY (INHALATION) 2 TIMES DAILY
Qty: 1 EACH | Refills: 1 | Status: SHIPPED | OUTPATIENT
Start: 2019-09-12 | End: 2021-01-18

## 2019-09-12 RX ORDER — TRAMADOL HYDROCHLORIDE 50 MG/1
50 TABLET ORAL EVERY 6 HOURS PRN
Qty: 40 TABLET | Refills: 0 | Status: CANCELLED | OUTPATIENT
Start: 2019-09-12

## 2019-09-12 NOTE — PROGRESS NOTES
[facility]    CHIEF COMPLAINT:  Patient presents with:  Urgent Care F/u: had a bad fall last thursday, tripped over a rocking chair, foot twisted, fractured her left foot, still a lot of pain, and her balance is worse         HISTORY OF PRESENT I [facility]) Oral Powder Take 17 g by mouth daily. Disp: 527 g Rfl: 11   ibuprofen 200 MG Oral Tab Take 400 mg by mouth. Disp:  Rfl:    Pantoprazole Sodium 40 MG Oral Tab EC Take 40 mg by mouth.  Disp:  Rfl:    Albuterol Sulfate  (90 Base) MCG/ACT Michelle Colón left leg with mild edema. Mild calf tenderness but no calf erythema. Bruise present on left knee and on left inner thigh.    NEURO: muscle strength normal.     DATA:  Results for orders placed or performed during the hospital encounter of 07/24/19   COMP ME x10(3) uL    Lymphocyte Absolute 2.23 1.00 - 4.00 x10(3) uL    Monocyte Absolute 0.68 0.10 - 1.00 x10(3) uL    Eosinophil Absolute 0.43 0.00 - 0.70 x10(3) uL    Basophil Absolute 0.04 0.00 - 0.20 x10(3) uL    Immature Granulocyte Absolute 0.01 0.00 - 1.00

## 2019-09-28 ENCOUNTER — LAB ENCOUNTER (OUTPATIENT)
Dept: LAB | Facility: HOSPITAL | Age: 44
End: 2019-09-28
Attending: INTERNAL MEDICINE
Payer: COMMERCIAL

## 2019-09-28 DIAGNOSIS — Z00.00 PHYSICAL EXAM, ANNUAL: ICD-10-CM

## 2019-09-28 DIAGNOSIS — Z83.49 FAMILY HISTORY OF HEMOCHROMATOSIS: ICD-10-CM

## 2019-09-28 DIAGNOSIS — R74.8 ELEVATED ALKALINE PHOSPHATASE LEVEL: ICD-10-CM

## 2019-09-28 LAB
ALBUMIN SERPL-MCNC: 3.3 G/DL (ref 3.4–5)
ALBUMIN/GLOB SERPL: 0.8 {RATIO} (ref 1–2)
ALP LIVER SERPL-CCNC: 136 U/L (ref 37–98)
ALT SERPL-CCNC: 38 U/L (ref 13–56)
ANION GAP SERPL CALC-SCNC: 6 MMOL/L (ref 0–18)
AST SERPL-CCNC: 26 U/L (ref 15–37)
BILIRUB DIRECT SERPL-MCNC: <0.1 MG/DL (ref 0–0.2)
BILIRUB SERPL-MCNC: 0.4 MG/DL (ref 0.1–2)
BUN BLD-MCNC: 10 MG/DL (ref 7–18)
BUN/CREAT SERPL: 13.2 (ref 10–20)
CALCIUM BLD-MCNC: 9.1 MG/DL (ref 8.5–10.1)
CHLORIDE SERPL-SCNC: 103 MMOL/L (ref 98–112)
CO2 SERPL-SCNC: 30 MMOL/L (ref 21–32)
CREAT BLD-MCNC: 0.76 MG/DL (ref 0.55–1.02)
GGT SERPL-CCNC: 202 U/L (ref 5–55)
GLOBULIN PLAS-MCNC: 4.4 G/DL (ref 2.8–4.4)
GLUCOSE BLD-MCNC: 98 MG/DL (ref 70–99)
M PROTEIN MFR SERPL ELPH: 7.7 G/DL (ref 6.4–8.2)
OSMOLALITY SERPL CALC.SUM OF ELEC: 287 MOSM/KG (ref 275–295)
POTASSIUM SERPL-SCNC: 4.1 MMOL/L (ref 3.5–5.1)
SODIUM SERPL-SCNC: 139 MMOL/L (ref 136–145)
TSI SER-ACNC: 1.57 MIU/ML (ref 0.36–3.74)
VIT D+METAB SERPL-MCNC: 13.1 NG/ML (ref 30–100)

## 2019-09-28 PROCEDURE — 86038 ANTINUCLEAR ANTIBODIES: CPT

## 2019-09-28 PROCEDURE — 86708 HEPATITIS A ANTIBODY: CPT

## 2019-09-28 PROCEDURE — 86704 HEP B CORE ANTIBODY TOTAL: CPT

## 2019-09-28 PROCEDURE — 80053 COMPREHEN METABOLIC PANEL: CPT

## 2019-09-28 PROCEDURE — 86803 HEPATITIS C AB TEST: CPT

## 2019-09-28 PROCEDURE — 82977 ASSAY OF GGT: CPT

## 2019-09-28 PROCEDURE — 83516 IMMUNOASSAY NONANTIBODY: CPT

## 2019-09-28 PROCEDURE — 82728 ASSAY OF FERRITIN: CPT

## 2019-09-28 PROCEDURE — 84443 ASSAY THYROID STIM HORMONE: CPT

## 2019-09-28 PROCEDURE — 82306 VITAMIN D 25 HYDROXY: CPT

## 2019-09-28 PROCEDURE — 83550 IRON BINDING TEST: CPT

## 2019-09-28 PROCEDURE — 82248 BILIRUBIN DIRECT: CPT

## 2019-09-28 PROCEDURE — 36415 COLL VENOUS BLD VENIPUNCTURE: CPT

## 2019-09-28 PROCEDURE — 82103 ALPHA-1-ANTITRYPSIN TOTAL: CPT

## 2019-09-28 PROCEDURE — 82784 ASSAY IGA/IGD/IGG/IGM EACH: CPT

## 2019-09-28 PROCEDURE — 87340 HEPATITIS B SURFACE AG IA: CPT

## 2019-09-28 PROCEDURE — 83540 ASSAY OF IRON: CPT

## 2019-09-30 ENCOUNTER — TELEPHONE (OUTPATIENT)
Dept: INTERNAL MEDICINE CLINIC | Facility: CLINIC | Age: 44
End: 2019-09-30

## 2019-09-30 ENCOUNTER — TELEPHONE (OUTPATIENT)
Dept: PODIATRY CLINIC | Facility: CLINIC | Age: 44
End: 2019-09-30

## 2019-09-30 DIAGNOSIS — E55.9 VITAMIN D DEFICIENCY: Primary | ICD-10-CM

## 2019-09-30 RX ORDER — ERGOCALCIFEROL 1.25 MG/1
50000 CAPSULE ORAL WEEKLY
Qty: 8 CAPSULE | Refills: 0 | Status: SHIPPED | OUTPATIENT
Start: 2019-09-30 | End: 2019-11-25

## 2019-09-30 NOTE — TELEPHONE ENCOUNTER
Submitted request for an  for appt coming up on 10/7/19 Monday @ 2:30pm.    Awaiting response and confirmation number from [facility]. If they call, please attach confirmation number to appointment notes.

## 2019-10-03 ENCOUNTER — HOSPITAL ENCOUNTER (OUTPATIENT)
Dept: ULTRASOUND IMAGING | Facility: HOSPITAL | Age: 44
Discharge: HOME | End: 2019-10-03
Attending: INTERNAL MEDICINE
Payer: COMMERCIAL

## 2019-10-03 DIAGNOSIS — R60.0 LEG EDEMA, LEFT: ICD-10-CM

## 2019-10-03 PROCEDURE — 93971 EXTREMITY STUDY: CPT | Performed by: INTERNAL MEDICINE

## 2019-10-04 ENCOUNTER — TELEPHONE (OUTPATIENT)
Dept: INTERNAL MEDICINE CLINIC | Facility: CLINIC | Age: 44
End: 2019-10-04

## 2019-10-04 DIAGNOSIS — Z01.84 IMMUNITY STATUS TESTING: Primary | ICD-10-CM

## 2019-10-04 NOTE — TELEPHONE ENCOUNTER
S/w xochilt at [facility] hearing society. Lillie la confirmed as . Time, date, location confirmed.  Urge msg sent to pt

## 2019-10-04 NOTE — TELEPHONE ENCOUNTER
Pt called she just recently had some labs drawn with the GI doctor. Patient called because there was several labs that were abnormal.  Patient was also told that she needs to get Hep A and B vaccines. OK to order?   Patient has a apt to come in to discu

## 2019-10-07 ENCOUNTER — OFFICE VISIT (OUTPATIENT)
Dept: PODIATRY CLINIC | Facility: CLINIC | Age: 44
End: 2019-10-07
Payer: COMMERCIAL

## 2019-10-07 DIAGNOSIS — S93.492A SPRAIN OF ANTERIOR TALOFIBULAR LIGAMENT OF LEFT ANKLE, INITIAL ENCOUNTER: Primary | ICD-10-CM

## 2019-10-07 DIAGNOSIS — M76.72 PERONEAL TENDINITIS OF LOWER LEG, LEFT: ICD-10-CM

## 2019-10-07 PROCEDURE — 99213 OFFICE O/P EST LOW 20 MIN: CPT | Performed by: PODIATRIST

## 2019-10-07 NOTE — TELEPHONE ENCOUNTER
twinrix cancelled. I don't see a hep b surface antibody. Will first need that to confirm she doesn't already have immunity to hep b. Please order.

## 2019-10-08 ENCOUNTER — TELEPHONE (OUTPATIENT)
Dept: PODIATRY CLINIC | Facility: CLINIC | Age: 44
End: 2019-10-08

## 2019-10-08 NOTE — PROGRESS NOTES
Jaxon Ryder is a 37year old female. Patient presents with: Follow - Up: LOV 7/8/19, pt went to urgent care after a fall on 9/7/19. pt given boot to wear on her left ankle. pt has not gotten the ultrasound ordered by Dr Chelsie Gao done.  pt has been ibuprofen 200 MG Oral Tab Take 400 mg by mouth. Disp:  Rfl:    Pantoprazole Sodium 40 MG Oral Tab EC Take 40 mg by mouth.  Disp:  Rfl:    Albuterol Sulfate  (90 Base) MCG/ACT Inhalation Aero Soln Inhale 2 puffs into the lungs every 6 (six) hours as communication   • Hearing loss    • Heart palpitations    • Heartburn    • Heavy menses    • Hemorrhoids    • High blood pressure    • Hoarseness, chronic    • IBS (irritable bowel syndrome)    • Irregular bowel habits    • Clarke pascal Maternal Grandmother         Lung   • Other (Other) Maternal Grandfather         Stroke   • Stroke Maternal Grandfather    • Asthma Brother    • Other (Other) Paternal Grandmother         ephzema   • Hypertension Brother    • Stroke Maternal Grandfather talofibular ligament a little bit on the calcaneal fibular ligament and a little bit along the peroneal tendons but minimal overall the patient has significant improvement.     ASSESSMENT AND PLAN:   Diagnoses and all orders for this visit:    Sprain of ant

## 2019-10-08 NOTE — TELEPHONE ENCOUNTER
Pt called stating pt had appointment 10-7-19. Pt received compression socks. They fit but painful. Ankle is hurting. Pain behind left knee. Left hip to. Please call to advise. If they do not get an answer please leave a message what pt can do.

## 2019-10-09 NOTE — TELEPHONE ENCOUNTER
S/w pt with assistance of . Patient was given ankle compression brace. Pt states that it fits, but it hurts. When she is wearing it and she steps she feels pain through the back of the heel and up through the back of her knee and into her hip.  Sachin Lopez

## 2019-10-09 NOTE — TELEPHONE ENCOUNTER
Please tell the patient that she can discontinue wearing the brace but must proceed with physical therapy thank you

## 2019-11-20 ENCOUNTER — TELEPHONE (OUTPATIENT)
Dept: PODIATRY CLINIC | Facility: CLINIC | Age: 44
End: 2019-11-20

## 2019-11-20 DIAGNOSIS — J45.20 MILD INTERMITTENT ASTHMA WITHOUT COMPLICATION: ICD-10-CM

## 2019-11-20 NOTE — TELEPHONE ENCOUNTER
Yes please have the patient proceed with a diagnostic ultrasound because at her last visit she was still experiencing some pain.   I want the ultrasound done by Dr. Garcia Muñoz at [facility] imaging thank you

## 2019-11-20 NOTE — TELEPHONE ENCOUNTER
Dr. Mandie Robert ordered pt US left ankle on 07/08/19. This has not been completed yet. LOV with Nick was on 10/07/19.     -- Dr. Mandie Robert, do you want pt to complete U/S or there a change in Tx plan? See notes from 10/07/19 at [address] [place] [address].

## 2019-11-21 RX ORDER — MONTELUKAST SODIUM 10 MG/1
TABLET ORAL
Qty: 90 TABLET | Refills: 1 | Status: SHIPPED | OUTPATIENT
Start: 2019-11-21 | End: 2021-01-18

## 2020-01-13 ENCOUNTER — TELEPHONE (OUTPATIENT)
Dept: OBGYN CLINIC | Facility: CLINIC | Age: 45
End: 2020-01-13

## 2020-01-13 NOTE — TELEPHONE ENCOUNTER
Spoke with patient through a . Informed there is a savings card on [email or web address] she can use. We have a sample she can . . She probably needs prior authorization for the new year.  Told on Wednesday when I return I can start

## 2020-01-13 NOTE — TELEPHONE ENCOUNTER
Patient went to fill her prescription for [facility] and it was going to cost $700.00. She said you mentioned a program that would make it affordable.

## 2020-01-15 ENCOUNTER — TELEPHONE (OUTPATIENT)
Dept: OBGYN CLINIC | Facility: CLINIC | Age: 45
End: 2020-01-15

## 2020-01-15 NOTE — TELEPHONE ENCOUNTER
PC with Abilio Phan from [facility] [employer]. Reference # [id number]. Ask who the patient prescription services are through to get a prior authorization for Orilisa? [facility]. Phone number [phone].

## 2020-01-16 ENCOUNTER — TELEPHONE (OUTPATIENT)
Dept: OBGYN CLINIC | Facility: CLINIC | Age: 45
End: 2020-01-16

## 2020-01-16 NOTE — TELEPHONE ENCOUNTER
PC with [employer] Crystal Thomas (reference name). Asking if she has coverage for Orilissa? Told she does have 90 coverage through retail [facility] or mail order. Message left for patient to call back to relay information.

## 2020-02-10 ENCOUNTER — HOSPITAL ENCOUNTER (OUTPATIENT)
Age: 45
Discharge: ANOTHER HOSPITAL | End: 2020-02-10
Attending: FAMILY MEDICINE
Payer: COMMERCIAL

## 2020-02-10 ENCOUNTER — APPOINTMENT (OUTPATIENT)
Dept: CT IMAGING | Facility: HOSPITAL | Age: 45
End: 2020-02-10
Attending: EMERGENCY MEDICINE
Payer: COMMERCIAL

## 2020-02-10 ENCOUNTER — HOSPITAL ENCOUNTER (EMERGENCY)
Facility: HOSPITAL | Age: 45
Discharge: HOME | End: 2020-02-11
Attending: EMERGENCY MEDICINE
Payer: COMMERCIAL

## 2020-02-10 VITALS
WEIGHT: 200 LBS | TEMPERATURE: 99 F | RESPIRATION RATE: 18 BRPM | OXYGEN SATURATION: 98 % | HEART RATE: 79 BPM | HEIGHT: 62 IN | SYSTOLIC BLOOD PRESSURE: 118 MMHG | BODY MASS INDEX: 36.8 KG/M2 | DIASTOLIC BLOOD PRESSURE: 69 MMHG

## 2020-02-10 VITALS
OXYGEN SATURATION: 98 % | DIASTOLIC BLOOD PRESSURE: 75 MMHG | SYSTOLIC BLOOD PRESSURE: 118 MMHG | RESPIRATION RATE: 18 BRPM | HEART RATE: 88 BPM | TEMPERATURE: 99 F

## 2020-02-10 DIAGNOSIS — K52.9 GASTROENTERITIS: Primary | ICD-10-CM

## 2020-02-10 DIAGNOSIS — N83.202 CYST OF LEFT OVARY: ICD-10-CM

## 2020-02-10 DIAGNOSIS — R11.2 NAUSEA VOMITING AND DIARRHEA: Primary | ICD-10-CM

## 2020-02-10 DIAGNOSIS — R19.7 NAUSEA VOMITING AND DIARRHEA: Primary | ICD-10-CM

## 2020-02-10 DIAGNOSIS — E86.0 DEHYDRATION: ICD-10-CM

## 2020-02-10 DIAGNOSIS — R10.9 ABDOMINAL PAIN OF UNKNOWN ETIOLOGY: ICD-10-CM

## 2020-02-10 LAB
ALBUMIN SERPL-MCNC: 3.3 G/DL (ref 3.4–5)
ALBUMIN/GLOB SERPL: 0.7 {RATIO} (ref 1–2)
ALP LIVER SERPL-CCNC: 167 U/L (ref 39–100)
ALT SERPL-CCNC: 41 U/L (ref 13–56)
ANION GAP SERPL CALC-SCNC: 5 MMOL/L (ref 0–18)
AST SERPL-CCNC: 32 U/L (ref 15–37)
ATRIAL RATE: 80 BPM
BASOPHILS # BLD AUTO: 0.01 X10(3) UL (ref 0–0.2)
BASOPHILS NFR BLD AUTO: 0.1 %
BILIRUB SERPL-MCNC: 0.5 MG/DL (ref 0.1–2)
BUN BLD-MCNC: 14 MG/DL (ref 7–18)
BUN/CREAT SERPL: 23 (ref 10–20)
CALCIUM BLD-MCNC: 8.4 MG/DL (ref 8.5–10.1)
CHLORIDE SERPL-SCNC: 104 MMOL/L (ref 98–112)
CLARITY UR REFRACT.AUTO: CLEAR
CO2 SERPL-SCNC: 26 MMOL/L (ref 21–32)
COLOR UR AUTO: YELLOW
CREAT BLD-MCNC: 0.61 MG/DL (ref 0.55–1.02)
DEPRECATED RDW RBC AUTO: 45.7 FL (ref 35.1–46.3)
EOSINOPHIL # BLD AUTO: 0.05 X10(3) UL (ref 0–0.7)
EOSINOPHIL NFR BLD AUTO: 0.6 %
ERYTHROCYTE [DISTWIDTH] IN BLOOD BY AUTOMATED COUNT: 14.4 % (ref 11–15)
GLOBULIN PLAS-MCNC: 4.9 G/DL (ref 2.8–4.4)
GLUCOSE BLD-MCNC: 117 MG/DL (ref 70–99)
GLUCOSE UR STRIP.AUTO-MCNC: NEGATIVE MG/DL
HCT VFR BLD AUTO: 40.1 % (ref 35–48)
HGB BLD-MCNC: 12.4 G/DL (ref 12–16)
IMM GRANULOCYTES # BLD AUTO: 0.03 X10(3) UL (ref 0–1)
IMM GRANULOCYTES NFR BLD: 0.4 %
KETONES UR STRIP.AUTO-MCNC: NEGATIVE MG/DL
LEUKOCYTE ESTERASE UR QL STRIP.AUTO: NEGATIVE
LIPASE SERPL-CCNC: 261 U/L (ref 73–393)
LYMPHOCYTES # BLD AUTO: 0.36 X10(3) UL (ref 1–4)
LYMPHOCYTES NFR BLD AUTO: 4.4 %
M PROTEIN MFR SERPL ELPH: 8.2 G/DL (ref 6.4–8.2)
MCH RBC QN AUTO: 27 PG (ref 26–34)
MCHC RBC AUTO-ENTMCNC: 30.9 G/DL (ref 31–37)
MCV RBC AUTO: 87.4 FL (ref 80–100)
MONOCYTES # BLD AUTO: 0.56 X10(3) UL (ref 0.1–1)
MONOCYTES NFR BLD AUTO: 6.8 %
NEUTROPHILS # BLD AUTO: 7.23 X10 (3) UL (ref 1.5–7.7)
NEUTROPHILS # BLD AUTO: 7.23 X10(3) UL (ref 1.5–7.7)
NEUTROPHILS NFR BLD AUTO: 87.7 %
NITRITE UR QL STRIP.AUTO: NEGATIVE
OSMOLALITY SERPL CALC.SUM OF ELEC: 282 MOSM/KG (ref 275–295)
P AXIS: 66 DEGREES
P-R INTERVAL: 214 MS
PH UR STRIP.AUTO: 6 [PH] (ref 4.5–8)
PLATELET # BLD AUTO: 234 10(3)UL (ref 150–450)
POCT URINE PREGNANCY: NEGATIVE
POTASSIUM SERPL-SCNC: 3.7 MMOL/L (ref 3.5–5.1)
PROCEDURE CONTROL: YES
PROT UR STRIP.AUTO-MCNC: 30 MG/DL
Q-T INTERVAL: 486 MS
QRS DURATION: 66 MS
QTC CALCULATION (BEZET): 560 MS
R AXIS: 45 DEGREES
RBC # BLD AUTO: 4.59 X10(6)UL (ref 3.8–5.3)
RBC #/AREA URNS AUTO: >10 /HPF
SODIUM SERPL-SCNC: 135 MMOL/L (ref 136–145)
SP GR UR STRIP.AUTO: 1.02 (ref 1–1.03)
T AXIS: 108 DEGREES
TROPONIN I SERPL-MCNC: <0.045 NG/ML (ref ?–0.04)
UROBILINOGEN UR STRIP.AUTO-MCNC: <2 MG/DL
VENTRICULAR RATE: 80 BPM
WBC # BLD AUTO: 8.2 X10(3) UL (ref 4–11)

## 2020-02-10 PROCEDURE — 99214 OFFICE O/P EST MOD 30 MIN: CPT

## 2020-02-10 PROCEDURE — 93010 ELECTROCARDIOGRAM REPORT: CPT

## 2020-02-10 PROCEDURE — 99285 EMERGENCY DEPT VISIT HI MDM: CPT

## 2020-02-10 PROCEDURE — 81001 URINALYSIS AUTO W/SCOPE: CPT | Performed by: EMERGENCY MEDICINE

## 2020-02-10 PROCEDURE — 84484 ASSAY OF TROPONIN QUANT: CPT | Performed by: EMERGENCY MEDICINE

## 2020-02-10 PROCEDURE — 85025 COMPLETE CBC W/AUTO DIFF WBC: CPT | Performed by: EMERGENCY MEDICINE

## 2020-02-10 PROCEDURE — 93005 ELECTROCARDIOGRAM TRACING: CPT

## 2020-02-10 PROCEDURE — 83690 ASSAY OF LIPASE: CPT | Performed by: EMERGENCY MEDICINE

## 2020-02-10 PROCEDURE — 96361 HYDRATE IV INFUSION ADD-ON: CPT

## 2020-02-10 PROCEDURE — 74177 CT ABD & PELVIS W/CONTRAST: CPT | Performed by: EMERGENCY MEDICINE

## 2020-02-10 PROCEDURE — 96374 THER/PROPH/DIAG INJ IV PUSH: CPT

## 2020-02-10 PROCEDURE — 93010 ELECTROCARDIOGRAM REPORT: CPT | Performed by: INTERNAL MEDICINE

## 2020-02-10 PROCEDURE — 80053 COMPREHEN METABOLIC PANEL: CPT | Performed by: EMERGENCY MEDICINE

## 2020-02-10 PROCEDURE — 81025 URINE PREGNANCY TEST: CPT

## 2020-02-10 PROCEDURE — 85025 COMPLETE CBC W/AUTO DIFF WBC: CPT | Performed by: FAMILY MEDICINE

## 2020-02-10 RX ORDER — PANTOPRAZOLE SODIUM 40 MG/1
40 TABLET, DELAYED RELEASE ORAL DAILY
Qty: 30 TABLET | Refills: 0 | Status: SHIPPED | OUTPATIENT
Start: 2020-02-10 | End: 2020-02-17

## 2020-02-10 RX ORDER — SODIUM CHLORIDE 9 MG/ML
1000 INJECTION, SOLUTION INTRAVENOUS ONCE
Status: DISCONTINUED | OUTPATIENT
Start: 2020-02-10 | End: 2020-02-10

## 2020-02-10 RX ORDER — METOCLOPRAMIDE HYDROCHLORIDE 5 MG/ML
10 INJECTION INTRAMUSCULAR; INTRAVENOUS ONCE
Status: DISCONTINUED | OUTPATIENT
Start: 2020-02-10 | End: 2020-02-10

## 2020-02-10 RX ORDER — METOCLOPRAMIDE HYDROCHLORIDE 5 MG/ML
5 INJECTION INTRAMUSCULAR; INTRAVENOUS ONCE
Status: COMPLETED | OUTPATIENT
Start: 2020-02-10 | End: 2020-02-10

## 2020-02-10 RX ORDER — ONDANSETRON 2 MG/ML
4 INJECTION INTRAMUSCULAR; INTRAVENOUS ONCE
Status: DISCONTINUED | OUTPATIENT
Start: 2020-02-10 | End: 2020-02-10

## 2020-02-10 RX ORDER — METOCLOPRAMIDE 5 MG/1
5 TABLET ORAL
Qty: 15 TABLET | Refills: 0 | Status: SHIPPED | OUTPATIENT
Start: 2020-02-10 | End: 2020-02-17

## 2020-02-10 NOTE — ED INITIAL ASSESSMENT (HPI)
Pt. C/o vomiting since 0600 today. Multiple episodes. Small amount diarrhea. Denies fevers, but has chills. Unable to tolerate oral meds today. Also, severe headache, pain 9/10.

## 2020-02-10 NOTE — ED PROVIDER NOTES
Patient Seen in: [facility]      History   Patient presents with:  Nausea/Vomiting/Diarrhea    Stated Complaint: throwing up all day / stomach pain    HPI    49-year-old female with a history of prolonged QT's with a defibr • Pain with bowel movements    • Prolonged QT interval syndrome    • Seizure disorder ([facility] [address] 11/2017    last Sezure 2017   • Shortness of breath    • Sleep apnea    • Sleep disturbance    • Stress    • Visual impairment     glasses   • Weight loss female in NAD. Head: Normocephalic atraumatic. Tenderness on palpation of the forehead and temples. No sinus tenderness on palpation. Eyes: EOMI, PERRLA  Ears: Normal external ear exam. Bilateral TMs are clear. Nose: Bilateral nares are clear.    Rufina specified.       Medications Prescribed:  Current Discharge Medication List

## 2020-02-10 NOTE — ED INITIAL ASSESSMENT (HPI)
Patient started vomiting around 6AM and has not bene able to stop until 2:30 this afternoon. Having severe headache. BP 88/54. Patient sent here from [address] [place] [address].

## 2020-02-10 NOTE — ED NOTES
2 attempts at IV start, without success. Jack Hedrick RN also looked, without success. Dr. Edson Garcia updated with inability to start IV or give meds via IV. He discussed with patient and decision was made to transfer her to the ER for further treatment.

## 2020-02-11 ENCOUNTER — APPOINTMENT (OUTPATIENT)
Dept: CARDIOLOGY | Age: 45
End: 2020-02-11

## 2020-02-11 LAB
ATRIAL RATE: 80 BPM
P AXIS: 61 DEGREES
P-R INTERVAL: 204 MS
Q-T INTERVAL: 510 MS
QRS DURATION: 64 MS
QTC CALCULATION (BEZET): 588 MS
R AXIS: 42 DEGREES
T AXIS: 115 DEGREES
VENTRICULAR RATE: 80 BPM

## 2020-02-11 NOTE — ED PROVIDER NOTES
Patient Seen in: [facility] Emergency Department      History   Patient presents with:  Nausea/Vomiting/Diarrhea  Dehydration    Stated Complaint: vomiting, diarrhea.   sent from immediate care    HPI    This is a 56-year-old female who is symptom st deaf;sign language communication   • Hearing loss    • Heart palpitations    • Heartburn    • Heavy menses    • Hemorrhoids    • High blood pressure    • Hoarseness, chronic    • IBS (irritable bowel syndrome)    • Irregular bowel habits    • Marcus and negative except as noted above.     Physical Exam     ED Triage Vitals [02/10/20 1726]   BP (!) 88/54   Pulse 80   Resp 19   Temp 98.8 °F (37.1 °C)   Temp src Oral   SpO2 100 %   O2 Device None (Room air)       Current:/69   Pulse 79   Temp 98.8 ° 30.9 (*)     Lymphocyte Absolute 0.36 (*)     All other components within normal limits   LIPASE - Normal   TROPONIN I - Normal   CBC WITH DIFFERENTIAL WITH PLATELET    Narrative:      The following orders were created for panel order CBC WITH DIFFERENTIAL patient feels comfortable going home I reexamined her abdomen is completely soft and nontender I discussed importance of close follow-up to return if increasing pain or discomfort. Given a prescription for Protonix, Reglan.   Will wait for the CT results a

## 2020-02-11 NOTE — ED NOTES
02/10/20 9391      Method of Interpretation Hospital  services    Translated To Plan of Care; Instructions; Assessment/Screening;Procedure explanation      services utilized through out ER stay.

## 2020-02-11 NOTE — ED NOTES
Pt reports that she is no longer feeling nauseas, states that her abdominal pain has subsided. all communication with patient has been done with video  for sign language.

## 2020-02-11 NOTE — ED NOTES
Ct Abdomen Pelvis Iv Contrast, No Oral (er)    Result Date: 2/10/2020  PROCEDURE:  CT ABDOMEN PELVIS IV CONTRAST, NO ORAL (ER)  COMPARISON:  REED AGUILLON, CT ABDOMEN PELVIS IV CONTRAST, NO ORAL (ER), 7/24/2019, 23:19. INDICATIONS:  vomiting, diarrhea.   sen Kp Mejias MD on 2/10/2020 at 23:17        With the  line I was discussed with her that the that she does have an incidental ovarian cyst which does not explain her symptoms but she she is having vomiting and diarrhea.   She does have finding

## 2020-02-17 ENCOUNTER — OFFICE VISIT (OUTPATIENT)
Dept: OBGYN CLINIC | Facility: CLINIC | Age: 45
End: 2020-02-17
Payer: COMMERCIAL

## 2020-02-17 ENCOUNTER — OFFICE VISIT (OUTPATIENT)
Dept: INTERNAL MEDICINE CLINIC | Facility: CLINIC | Age: 45
End: 2020-02-17
Payer: COMMERCIAL

## 2020-02-17 VITALS
DIASTOLIC BLOOD PRESSURE: 70 MMHG | BODY MASS INDEX: 38.21 KG/M2 | SYSTOLIC BLOOD PRESSURE: 100 MMHG | WEIGHT: 207.63 LBS | HEART RATE: 80 BPM | HEIGHT: 62 IN | TEMPERATURE: 98 F | RESPIRATION RATE: 12 BRPM

## 2020-02-17 VITALS
WEIGHT: 207 LBS | RESPIRATION RATE: 18 BRPM | SYSTOLIC BLOOD PRESSURE: 118 MMHG | TEMPERATURE: 98 F | BODY MASS INDEX: 38.09 KG/M2 | HEIGHT: 62 IN | HEART RATE: 82 BPM | DIASTOLIC BLOOD PRESSURE: 82 MMHG

## 2020-02-17 DIAGNOSIS — Z13.29 SCREENING FOR THYROID DISORDER: ICD-10-CM

## 2020-02-17 DIAGNOSIS — R45.4 IRRITABILITY: ICD-10-CM

## 2020-02-17 DIAGNOSIS — E86.0 DEHYDRATION: Primary | ICD-10-CM

## 2020-02-17 DIAGNOSIS — R11.2 NAUSEA AND VOMITING, INTRACTABILITY OF VOMITING NOT SPECIFIED, UNSPECIFIED VOMITING TYPE: ICD-10-CM

## 2020-02-17 DIAGNOSIS — Z13.220 SCREENING FOR CHOLESTEROL LEVEL: ICD-10-CM

## 2020-02-17 DIAGNOSIS — R93.5 ABNORMAL CT SCAN, PELVIS: ICD-10-CM

## 2020-02-17 DIAGNOSIS — E55.9 VITAMIN D DEFICIENCY: ICD-10-CM

## 2020-02-17 DIAGNOSIS — N80.9 ENDOMETRIOSIS: ICD-10-CM

## 2020-02-17 DIAGNOSIS — R19.7 DIARRHEA, UNSPECIFIED TYPE: ICD-10-CM

## 2020-02-17 DIAGNOSIS — N83.202 CYST OF LEFT OVARY: Primary | ICD-10-CM

## 2020-02-17 PROCEDURE — 99213 OFFICE O/P EST LOW 20 MIN: CPT | Performed by: NURSE PRACTITIONER

## 2020-02-17 RX ORDER — MAGNESIUM OXIDE 400 MG (241.3 MG MAGNESIUM) TABLET
1 TABLET 2 TIMES DAILY
COMMUNITY
Start: 2020-02-01

## 2020-02-17 RX ORDER — BUPROPION HYDROCHLORIDE 300 MG/1
300 TABLET ORAL DAILY
Qty: 90 TABLET | Refills: 1 | Status: SHIPPED | OUTPATIENT
Start: 2020-02-17 | End: 2020-08-18

## 2020-02-17 RX ORDER — METOCLOPRAMIDE 5 MG/1
5 TABLET ORAL
Qty: 15 TABLET | Refills: 0 | Status: SHIPPED | OUTPATIENT
Start: 2020-02-17 | End: 2020-03-17

## 2020-02-17 RX ORDER — ALBUTEROL SULFATE 90 UG/1
2 AEROSOL, METERED RESPIRATORY (INHALATION) EVERY 6 HOURS PRN
Qty: 1 INHALER | Refills: 0 | Status: SHIPPED | OUTPATIENT
Start: 2020-02-17 | End: 2020-03-17

## 2020-02-17 NOTE — PROGRESS NOTES
Elda Jang is a 53year old female. CHIEF COMPLAINT   ED follow-up    HPI:   The patient is here for a follow-up visit after being seen in the emergency room for nausea, vomiting, diarrhea, dehydration on 2/10.   The  is here in the of MONTELUKAST SODIUM 10 MG Oral Tab TAKE 1 TABLET(10 MG) BY MOUTH EVERY DAY 90 tablet 1   • Fluticasone-Salmeterol 232-14 MCG/ACT Inhalation Aerosol Powder, Breath Activated Inhale 1 puff into the lungs 2 (two) times daily.  1 each 1   • Elastic Bandages & Gauthier acute or chronic, without mention of hemorrhage or perforation    • Hearing impairment     deaf;sign language communication   • Hearing loss    • Heart palpitations    • Heartburn    • Heavy menses    • Hemorrhoids    • High blood pressure    • Hoarseness, cyanosis, clubbing or edema  NEURO: Oriented times three,cranial nerves are grossly intact,motor and sensory are grossly intact      LABS:      Lab Results   Component Value Date    WBC 8.2 02/10/2020    RBC 4.59 02/10/2020    HGB 12.4 02/10/2020    HCT 40 Dose Index Registry. PATIENT STATED HISTORY:(As transcribed by Technologist)  Patient complains of headache, nausea and vomiting all day and diarrhea. CONTRAST USED:  100cc of Omnipaque 350  FINDINGS:  LUNG BASE:  Atelectasis.  LIVER:  Homogeneous enhanc times.  But it is much more mild than previously. She is using Reglan about once a day. And wants that refilled just in case she needs it. Her CT the abdomen did show gallbladder distention.   Her alkaline phosphatase was slightly elevated, and has been

## 2020-02-17 NOTE — PROGRESS NOTES
Anil Vásquez is a 53year old female. No LMP recorded. Patient is perimenopausal.  Patient presents with:  Er F/u: left ovary    HPI:    Pt presents to follow up from her ED visit from 2/10/2020.  Pt was suffering from n/v/d and thought she had a impairment     deaf;sign language communication   • Hearing loss    • Heart palpitations    • Heartburn    • Heavy menses    • Hemorrhoids    • High blood pressure    • Hoarseness, chronic    • IBS (irritable bowel syndrome)    • Irregular bowel habits edema  PSYCHIATRIC: Patient oriented to time, place and person; mood and affect appropriate    IMPRESSION:   Cyst of left ovary  (primary encounter diagnosis)  Endometriosis  Abnormal ct scan, pelvis     PLAN:   Discussed findings of pelvic US.  Informed at

## 2020-02-18 ENCOUNTER — TELEPHONE (OUTPATIENT)
Dept: INTERNAL MEDICINE CLINIC | Facility: CLINIC | Age: 45
End: 2020-02-18

## 2020-02-18 NOTE — TELEPHONE ENCOUNTER
Please notify the patient that labs were ordered and are to be done prior to her physical with Dr. Angela Cash in March. Also her CT abd did show some gallbladder distention.  Although she did not have RUQ pain on exam yesterday her nausea could be related to that

## 2020-02-19 NOTE — TELEPHONE ENCOUNTER
Pt did not review mychart msg. Spoke with pt, advised of result and recommendation to schedule US (central scheduling number given) and see GI asap. Pt scheduled annual physical. PSR notified to arrange for ASL  for appt.

## 2020-02-20 ENCOUNTER — HOSPITAL ENCOUNTER (EMERGENCY)
Facility: HOSPITAL | Age: 45
Discharge: HOME | End: 2020-02-20
Attending: EMERGENCY MEDICINE
Payer: COMMERCIAL

## 2020-02-20 ENCOUNTER — APPOINTMENT (OUTPATIENT)
Dept: GENERAL RADIOLOGY | Facility: HOSPITAL | Age: 45
End: 2020-02-20
Attending: EMERGENCY MEDICINE
Payer: COMMERCIAL

## 2020-02-20 VITALS
RESPIRATION RATE: 20 BRPM | OXYGEN SATURATION: 96 % | BODY MASS INDEX: 36.8 KG/M2 | TEMPERATURE: 99 F | HEART RATE: 80 BPM | SYSTOLIC BLOOD PRESSURE: 117 MMHG | DIASTOLIC BLOOD PRESSURE: 73 MMHG | WEIGHT: 200 LBS | HEIGHT: 62 IN

## 2020-02-20 DIAGNOSIS — J45.901 ASTHMA EXACERBATION, MILD: ICD-10-CM

## 2020-02-20 DIAGNOSIS — J01.90 ACUTE SINUSITIS, RECURRENCE NOT SPECIFIED, UNSPECIFIED LOCATION: Primary | ICD-10-CM

## 2020-02-20 PROCEDURE — 99284 EMERGENCY DEPT VISIT MOD MDM: CPT

## 2020-02-20 PROCEDURE — 71046 X-RAY EXAM CHEST 2 VIEWS: CPT | Performed by: EMERGENCY MEDICINE

## 2020-02-20 PROCEDURE — 94640 AIRWAY INHALATION TREATMENT: CPT

## 2020-02-20 RX ORDER — ALBUTEROL SULFATE 90 UG/1
2 AEROSOL, METERED RESPIRATORY (INHALATION) EVERY 4 HOURS PRN
Qty: 1 INHALER | Refills: 0 | Status: SHIPPED | OUTPATIENT
Start: 2020-02-20 | End: 2020-03-21

## 2020-02-20 RX ORDER — DOXYCYCLINE HYCLATE 100 MG/1
100 CAPSULE ORAL 2 TIMES DAILY
Qty: 20 CAPSULE | Refills: 0 | Status: SHIPPED | OUTPATIENT
Start: 2020-02-20 | End: 2020-03-01

## 2020-02-20 RX ORDER — IPRATROPIUM BROMIDE AND ALBUTEROL SULFATE 2.5; .5 MG/3ML; MG/3ML
3 SOLUTION RESPIRATORY (INHALATION) ONCE
Status: COMPLETED | OUTPATIENT
Start: 2020-02-20 | End: 2020-02-20

## 2020-02-20 RX ORDER — PREDNISONE 20 MG/1
40 TABLET ORAL ONCE
Status: COMPLETED | OUTPATIENT
Start: 2020-02-20 | End: 2020-02-20

## 2020-02-20 RX ORDER — PREDNISONE 20 MG/1
40 TABLET ORAL DAILY
Qty: 8 TABLET | Refills: 0 | Status: SHIPPED | OUTPATIENT
Start: 2020-02-20 | End: 2020-02-24

## 2020-02-20 RX ORDER — IPRATROPIUM BROMIDE AND ALBUTEROL SULFATE 2.5; .5 MG/3ML; MG/3ML
3 SOLUTION RESPIRATORY (INHALATION) ONCE
Status: DISCONTINUED | OUTPATIENT
Start: 2020-02-20 | End: 2020-02-20

## 2020-02-20 NOTE — ED PROVIDER NOTES
Patient Seen in: [facility] Emergency Department      History   Patient presents with:  Cough/URI    Stated Complaint: SINUS PAIN    HPI    Very pleasant hearing-impaired 40-year-old woman presents to the emergency department with cough, difficulty Michael 2017   • Shortness of breath    • Sleep apnea    • Sleep disturbance    • Stress    • Visual impairment     glasses   • Weight loss               Past Surgical History:   Procedure Laterality Date   • CARDIAC DEFIBRILLATOR PLACEMENT  2015    dual ch lymphadenopathy lungs have expiratory wheezes throughout the lung fields heart has a regular rate and rhythm without murmurs rubs or gallops abdomen is soft nontender nondistended upper and lower extremities are benign  ED Course   Labs Reviewed - No data [place] [address]  [phone]    Schedule an appointment as soon as possible for a visit          Medications Prescribed:  Current Discharge Medication List    START taking these medications    Doxycycline Hyclate 100 MG Oral Cap  Take 1 capsule (100 mg tota

## 2020-02-20 NOTE — ED INITIAL ASSESSMENT (HPI)
Patient presents with c/o BONILLA, cough, trouble sleeping at night because of these symptoms. She also feels like her throat is swollen. She reports the same symptoms a few weeks ago and completed antibiotics at that time. Symptoms restarted on Monday.

## 2020-02-25 ENCOUNTER — OFFICE VISIT (OUTPATIENT)
Dept: INTERNAL MEDICINE CLINIC | Facility: CLINIC | Age: 45
End: 2020-02-25
Payer: COMMERCIAL

## 2020-02-25 ENCOUNTER — TELEPHONE (OUTPATIENT)
Dept: INTERNAL MEDICINE CLINIC | Facility: CLINIC | Age: 45
End: 2020-02-25

## 2020-02-25 VITALS
HEIGHT: 62 IN | BODY MASS INDEX: 37.94 KG/M2 | RESPIRATION RATE: 14 BRPM | TEMPERATURE: 98 F | OXYGEN SATURATION: 98 % | HEART RATE: 82 BPM | DIASTOLIC BLOOD PRESSURE: 78 MMHG | SYSTOLIC BLOOD PRESSURE: 100 MMHG | WEIGHT: 206.19 LBS

## 2020-02-25 DIAGNOSIS — J45.21 MILD INTERMITTENT ASTHMA WITH ACUTE EXACERBATION: Primary | ICD-10-CM

## 2020-02-25 DIAGNOSIS — R05.9 COUGH: ICD-10-CM

## 2020-02-25 DIAGNOSIS — J01.10 ACUTE NON-RECURRENT FRONTAL SINUSITIS: ICD-10-CM

## 2020-02-25 LAB
CONTROL LINE PRESENT WITH A CLEAR BACKGROUND (YES/NO): YES YES/NO
KIT LOT #: NORMAL NUMERIC
STREP GRP A CUL-SCR: NEGATIVE

## 2020-02-25 PROCEDURE — 94640 AIRWAY INHALATION TREATMENT: CPT | Performed by: NURSE PRACTITIONER

## 2020-02-25 PROCEDURE — 94664 DEMO&/EVAL PT USE INHALER: CPT | Performed by: NURSE PRACTITIONER

## 2020-02-25 PROCEDURE — 99214 OFFICE O/P EST MOD 30 MIN: CPT | Performed by: NURSE PRACTITIONER

## 2020-02-25 PROCEDURE — 87880 STREP A ASSAY W/OPTIC: CPT | Performed by: NURSE PRACTITIONER

## 2020-02-25 RX ORDER — IPRATROPIUM BROMIDE AND ALBUTEROL SULFATE 2.5; .5 MG/3ML; MG/3ML
3 SOLUTION RESPIRATORY (INHALATION) ONCE
Status: COMPLETED | OUTPATIENT
Start: 2020-02-25 | End: 2020-02-25

## 2020-02-25 RX ORDER — BENZONATATE 200 MG/1
200 CAPSULE ORAL 3 TIMES DAILY PRN
Qty: 21 CAPSULE | Refills: 0 | Status: SHIPPED | OUTPATIENT
Start: 2020-02-25 | End: 2020-03-17

## 2020-02-25 RX ORDER — PREDNISONE 20 MG/1
40 TABLET ORAL DAILY
Qty: 10 TABLET | Refills: 0 | Status: SHIPPED | OUTPATIENT
Start: 2020-02-25 | End: 2020-03-01

## 2020-02-25 RX ADMIN — IPRATROPIUM BROMIDE AND ALBUTEROL SULFATE 3 ML: 2.5; .5 SOLUTION RESPIRATORY (INHALATION) at 16:48:00

## 2020-02-25 NOTE — PATIENT INSTRUCTIONS
Keep well hydrated     You can use robitussin DM or mucinex DM (Dextromethorphan) as directed on the bottle for cough and chest congestion. Use cepacol for sore throat and dry cough as needed.       Use benzonotate as directed    Continue the steroid e

## 2020-02-25 NOTE — TELEPHONE ENCOUNTER
Received call from patient via  for \"asthma attack\" per PSR. Spoke with patient via , and patient reports being in ER on 2/20/2020 for asthma attack, for which she was prescribed doxycycline, albuterol MDI, and prednisone.  P faxed to [phone] as on form. Fax confirmation received. Advised MA and NP that  requested, but if unable to arrange , patient is agreeable to pen and paper notes for this visit.

## 2020-02-26 ENCOUNTER — ULTRASOUND ENCOUNTER (OUTPATIENT)
Dept: OBGYN CLINIC | Facility: CLINIC | Age: 45
End: 2020-02-26
Payer: COMMERCIAL

## 2020-02-26 NOTE — PROGRESS NOTES
Alba Sanchez is a 53year old female. CHIEF COMPLAINT   Cough, sinus pain, headache, wheezing    HPI:   The patient is here for a ERf/u for sinusitis, and she is still experiencing a cough and wheezing.  She was seen in the ER on 2/20 and dx with s MG Oral Tablet 24 Hr Take 1 tablet (300 mg total) by mouth daily. 90 tablet 1   • Metoclopramide HCl 5 MG Oral Tab Take 1 tablet (5 mg total) by mouth 3 (three) times daily before meals.  15 tablet 0   • Elagolix Sodium (ORILISSA) 150 MG Oral Tab Take 1 tab defibrillator in place    • CERVICAL DYSPLASIA 06/12/09    lgsil   • Constipation    • Decorative tattoo    • Diarrhea, unspecified    • Easy bruising    • Endometriosis     Has had for approx.  15 years; does not always have symptoms   • Endometriosis    • normocephalic,ears with cerumen bilaterally and throat with mild pharyngitis. Has mild frontal sinus tenderness.    EYES:PERRLA, EOMI, conjunctiva are clear  NECK: supple,no adenopathy  LUNGS: clear to auscultation, but diminished and audible wheezing to he and lateral chest radiographs were obtained. PATIENT STATED HISTORY: (As transcribed by Technologist)  Patient here for cough and shortness of breath begining last night. She stated she had trouble sleeping last night due to these symptoms.     FINDINGS: plan.  The patient is asked to return in 1-2 weeks.

## 2020-02-28 RX ORDER — FUROSEMIDE 40 MG/1
TABLET ORAL
Qty: 30 TABLET | Refills: 0 | OUTPATIENT
Start: 2020-02-28

## 2020-03-05 ENCOUNTER — HOSPITAL ENCOUNTER (EMERGENCY)
Facility: HOSPITAL | Age: 45
Discharge: HOME | End: 2020-03-05
Attending: EMERGENCY MEDICINE
Payer: COMMERCIAL

## 2020-03-05 ENCOUNTER — APPOINTMENT (OUTPATIENT)
Dept: GENERAL RADIOLOGY | Facility: HOSPITAL | Age: 45
End: 2020-03-05
Payer: COMMERCIAL

## 2020-03-05 VITALS
SYSTOLIC BLOOD PRESSURE: 106 MMHG | WEIGHT: 200 LBS | HEIGHT: 62 IN | BODY MASS INDEX: 36.8 KG/M2 | TEMPERATURE: 96 F | OXYGEN SATURATION: 100 % | HEART RATE: 78 BPM | DIASTOLIC BLOOD PRESSURE: 76 MMHG | RESPIRATION RATE: 16 BRPM

## 2020-03-05 DIAGNOSIS — R07.89 CHEST PAIN, ATYPICAL: Primary | ICD-10-CM

## 2020-03-05 LAB
ALBUMIN SERPL-MCNC: 2.9 G/DL (ref 3.4–5)
ALBUMIN/GLOB SERPL: 0.8 {RATIO} (ref 1–2)
ALP LIVER SERPL-CCNC: 165 U/L (ref 39–100)
ALT SERPL-CCNC: 30 U/L (ref 13–56)
ANION GAP SERPL CALC-SCNC: 4 MMOL/L (ref 0–18)
AST SERPL-CCNC: 19 U/L (ref 15–37)
BASOPHILS # BLD AUTO: 0.04 X10(3) UL (ref 0–0.2)
BASOPHILS NFR BLD AUTO: 0.5 %
BILIRUB SERPL-MCNC: 0.5 MG/DL (ref 0.1–2)
BUN BLD-MCNC: 13 MG/DL (ref 7–18)
BUN/CREAT SERPL: 18.1 (ref 10–20)
CALCIUM BLD-MCNC: 8.2 MG/DL (ref 8.5–10.1)
CHLORIDE SERPL-SCNC: 109 MMOL/L (ref 98–112)
CO2 SERPL-SCNC: 26 MMOL/L (ref 21–32)
CREAT BLD-MCNC: 0.72 MG/DL (ref 0.55–1.02)
DEPRECATED RDW RBC AUTO: 51.3 FL (ref 35.1–46.3)
EOSINOPHIL # BLD AUTO: 0.07 X10(3) UL (ref 0–0.7)
EOSINOPHIL NFR BLD AUTO: 1 %
ERYTHROCYTE [DISTWIDTH] IN BLOOD BY AUTOMATED COUNT: 15.8 % (ref 11–15)
GLOBULIN PLAS-MCNC: 3.8 G/DL (ref 2.8–4.4)
GLUCOSE BLD-MCNC: 120 MG/DL (ref 70–99)
HCT VFR BLD AUTO: 38.3 % (ref 35–48)
HGB BLD-MCNC: 12 G/DL (ref 12–16)
IMM GRANULOCYTES # BLD AUTO: 0.01 X10(3) UL (ref 0–1)
IMM GRANULOCYTES NFR BLD: 0.1 %
LYMPHOCYTES # BLD AUTO: 2.15 X10(3) UL (ref 1–4)
LYMPHOCYTES NFR BLD AUTO: 29.3 %
M PROTEIN MFR SERPL ELPH: 6.7 G/DL (ref 6.4–8.2)
MCH RBC QN AUTO: 27.8 PG (ref 26–34)
MCHC RBC AUTO-ENTMCNC: 31.3 G/DL (ref 31–37)
MCV RBC AUTO: 88.7 FL (ref 80–100)
MONOCYTES # BLD AUTO: 0.66 X10(3) UL (ref 0.1–1)
MONOCYTES NFR BLD AUTO: 9 %
NEUTROPHILS # BLD AUTO: 4.4 X10 (3) UL (ref 1.5–7.7)
NEUTROPHILS # BLD AUTO: 4.4 X10(3) UL (ref 1.5–7.7)
NEUTROPHILS NFR BLD AUTO: 60.1 %
OSMOLALITY SERPL CALC.SUM OF ELEC: 289 MOSM/KG (ref 275–295)
PLATELET # BLD AUTO: 216 10(3)UL (ref 150–450)
POTASSIUM SERPL-SCNC: 3.8 MMOL/L (ref 3.5–5.1)
RBC # BLD AUTO: 4.32 X10(6)UL (ref 3.8–5.3)
SODIUM SERPL-SCNC: 139 MMOL/L (ref 136–145)
TROPONIN I SERPL-MCNC: <0.045 NG/ML (ref ?–0.04)
WBC # BLD AUTO: 7.3 X10(3) UL (ref 4–11)

## 2020-03-05 PROCEDURE — 93005 ELECTROCARDIOGRAM TRACING: CPT

## 2020-03-05 PROCEDURE — 80053 COMPREHEN METABOLIC PANEL: CPT

## 2020-03-05 PROCEDURE — 85025 COMPLETE CBC W/AUTO DIFF WBC: CPT

## 2020-03-05 PROCEDURE — 85025 COMPLETE CBC W/AUTO DIFF WBC: CPT | Performed by: EMERGENCY MEDICINE

## 2020-03-05 PROCEDURE — 84484 ASSAY OF TROPONIN QUANT: CPT | Performed by: EMERGENCY MEDICINE

## 2020-03-05 PROCEDURE — 36415 COLL VENOUS BLD VENIPUNCTURE: CPT

## 2020-03-05 PROCEDURE — 84484 ASSAY OF TROPONIN QUANT: CPT

## 2020-03-05 PROCEDURE — 80053 COMPREHEN METABOLIC PANEL: CPT | Performed by: EMERGENCY MEDICINE

## 2020-03-05 PROCEDURE — 93010 ELECTROCARDIOGRAM REPORT: CPT

## 2020-03-05 PROCEDURE — 99285 EMERGENCY DEPT VISIT HI MDM: CPT

## 2020-03-05 PROCEDURE — 99284 EMERGENCY DEPT VISIT MOD MDM: CPT

## 2020-03-05 PROCEDURE — 71045 X-RAY EXAM CHEST 1 VIEW: CPT

## 2020-03-05 NOTE — ED NOTES
MD at bedside and  on ipad at this time. Pt states pain developed last night to left chest and down left arm last night at 8pm after having a verbal argument. Pt states pain is better but has been constant since last night.

## 2020-03-05 NOTE — ED PROVIDER NOTES
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      Eyes: EOM are Report. Rate: 80  Rhythm: Sinus Rhythm  Reading: No acute ischemic changes ST changes in the high lateral leads in the precordium have been seen before as has some long QT. CBC BMP troponin negative.   Chest x-ray personally reviewed, agree wi

## 2020-03-05 NOTE — ED NOTES
Pt awake and alert, skin w/d,resps reg/unlabored. Pt appears comfortable and in no distress. Family at bedside.  used to review discharge instructions and pt verbalized understanding.

## 2020-03-05 NOTE — ED INITIAL ASSESSMENT (HPI)
Pt states had a \"stressful situation\" last night, c/o left sided chest pain radiating to left arm since last night.

## 2020-03-05 NOTE — ED NOTES
Pt states she still has pain but it is improved, now 6/10. Pt does not appear in any distress. Pt would like an in-person , but is OK with using Ipad for now. Pt would prefer  to be female.

## 2020-03-06 LAB
ATRIAL RATE: 80 BPM
P AXIS: 64 DEGREES
P-R INTERVAL: 228 MS
Q-T INTERVAL: 480 MS
QRS DURATION: 68 MS
QTC CALCULATION (BEZET): 553 MS
R AXIS: 40 DEGREES
T AXIS: 100 DEGREES
VENTRICULAR RATE: 80 BPM

## 2020-03-17 ENCOUNTER — OFFICE VISIT (OUTPATIENT)
Dept: INTERNAL MEDICINE CLINIC | Facility: CLINIC | Age: 45
End: 2020-03-17
Payer: COMMERCIAL

## 2020-03-17 VITALS
DIASTOLIC BLOOD PRESSURE: 78 MMHG | RESPIRATION RATE: 12 BRPM | HEART RATE: 80 BPM | WEIGHT: 211 LBS | SYSTOLIC BLOOD PRESSURE: 128 MMHG | BODY MASS INDEX: 38.83 KG/M2 | TEMPERATURE: 98 F | HEIGHT: 62 IN | OXYGEN SATURATION: 98 %

## 2020-03-17 DIAGNOSIS — J45.21 MILD INTERMITTENT ASTHMA WITH ACUTE EXACERBATION: Primary | ICD-10-CM

## 2020-03-17 PROCEDURE — 99214 OFFICE O/P EST MOD 30 MIN: CPT | Performed by: NURSE PRACTITIONER

## 2020-03-17 RX ORDER — BENZONATATE 200 MG/1
200 CAPSULE ORAL 3 TIMES DAILY PRN
Qty: 21 CAPSULE | Refills: 0 | Status: SHIPPED | OUTPATIENT
Start: 2020-03-17 | End: 2021-01-15

## 2020-03-17 RX ORDER — METHYLPREDNISOLONE 4 MG/1
TABLET ORAL
Qty: 1 KIT | Refills: 0 | Status: SHIPPED | OUTPATIENT
Start: 2020-03-17 | End: 2020-11-27 | Stop reason: ALTCHOICE

## 2020-03-17 RX ORDER — PANTOPRAZOLE SODIUM 40 MG/1
40 TABLET, DELAYED RELEASE ORAL
Qty: 90 TABLET | Refills: 0 | Status: SHIPPED | OUTPATIENT
Start: 2020-03-17 | End: 2020-07-29

## 2020-03-17 RX ORDER — METOCLOPRAMIDE 5 MG/1
5 TABLET ORAL
Qty: 15 TABLET | Refills: 0 | Status: SHIPPED | OUTPATIENT
Start: 2020-03-17

## 2020-03-17 NOTE — PATIENT INSTRUCTIONS
Take the medrol dose pack with food and in the morning. Do not take ibuprofen while taking the steroid. Continue your inhalers.      Follow up in two weeks

## 2020-03-17 NOTE — PROGRESS NOTES
Colby Thacker is a 53year old female. CHIEF COMPLAINT   ED follow up    HPI:   The patient reports her chest pain is now completely resolved at this time and has not recurred since the emergency room.      She does however report that she is still 200 MG Oral Tab Take 400 mg by mouth. • furosemide 40 MG Oral Tab Take 40 mg by mouth daily. Takes 60 mg total per day.  A 40 mg and a 20 mg.   3   • Propranolol HCl  MG Oral Capsule SR 24 Hr TK 4 CS PO D  5   • EPINEPHrine 0.3 MG/0.3ML Injection disorder ([facility] 11/2017    last Sezure 2017   • Shortness of breath    • Sleep apnea    • Sleep disturbance    • Stress    • Visual impairment     glasses   • Weight loss       Social History:  Social History    Tobacco Use      Smoking status: Former Smoke GFRNAA 101 03/05/2020    GFRAA 116 03/05/2020    CA 8.2 (L) 03/05/2020    OSMOCALC 289 03/05/2020    ALKPHO 165 (H) 03/05/2020    AST 19 03/05/2020    ALT 30 03/05/2020    BILT 0.5 03/05/2020    TP 6.7 03/05/2020    ALB 2.9 (L) 03/05/2020    GLOBULIN 3.8 0

## 2020-03-18 ENCOUNTER — TELEPHONE (OUTPATIENT)
Dept: CARDIOLOGY | Age: 45
End: 2020-03-18

## 2020-03-18 ENCOUNTER — E-ADVICE (OUTPATIENT)
Dept: CARDIOLOGY | Age: 45
End: 2020-03-18

## 2020-03-18 DIAGNOSIS — I45.81 LONG Q-T SYNDROME: Primary | ICD-10-CM

## 2020-03-18 DIAGNOSIS — I49.01 VENTRICULAR FIBRILLATION (CMD): ICD-10-CM

## 2020-03-20 RX ORDER — FUROSEMIDE 40 MG/1
TABLET ORAL
Qty: 90 TABLET | Refills: 0 | Status: SHIPPED | OUTPATIENT
Start: 2020-03-20

## 2020-03-23 ENCOUNTER — HOSPITAL ENCOUNTER (EMERGENCY)
Facility: HOSPITAL | Age: 45
Discharge: HOME | End: 2020-03-23
Attending: EMERGENCY MEDICINE
Payer: COMMERCIAL

## 2020-03-23 ENCOUNTER — TELEPHONE (OUTPATIENT)
Dept: INTERNAL MEDICINE CLINIC | Facility: CLINIC | Age: 45
End: 2020-03-23

## 2020-03-23 ENCOUNTER — APPOINTMENT (OUTPATIENT)
Dept: GENERAL RADIOLOGY | Facility: HOSPITAL | Age: 45
End: 2020-03-23
Attending: PHYSICIAN ASSISTANT
Payer: COMMERCIAL

## 2020-03-23 VITALS
TEMPERATURE: 99 F | BODY MASS INDEX: 39 KG/M2 | SYSTOLIC BLOOD PRESSURE: 103 MMHG | WEIGHT: 211 LBS | HEART RATE: 80 BPM | RESPIRATION RATE: 18 BRPM | DIASTOLIC BLOOD PRESSURE: 81 MMHG | OXYGEN SATURATION: 100 %

## 2020-03-23 DIAGNOSIS — J45.21 MILD INTERMITTENT ASTHMA WITH ACUTE EXACERBATION: Primary | ICD-10-CM

## 2020-03-23 DIAGNOSIS — J45.31 MILD PERSISTENT ASTHMA WITH EXACERBATION: Primary | ICD-10-CM

## 2020-03-23 PROCEDURE — 99441 PHONE E/M BY PHYS 5-10 MIN: CPT | Performed by: INTERNAL MEDICINE

## 2020-03-23 PROCEDURE — 99284 EMERGENCY DEPT VISIT MOD MDM: CPT

## 2020-03-23 PROCEDURE — 94640 AIRWAY INHALATION TREATMENT: CPT

## 2020-03-23 PROCEDURE — 71045 X-RAY EXAM CHEST 1 VIEW: CPT | Performed by: PHYSICIAN ASSISTANT

## 2020-03-23 RX ORDER — ALBUTEROL SULFATE 2.5 MG/3ML
2.5 SOLUTION RESPIRATORY (INHALATION) EVERY 4 HOURS PRN
Qty: 60 AMPULE | Refills: 0 | Status: SHIPPED | OUTPATIENT
Start: 2020-03-23

## 2020-03-23 RX ORDER — IPRATROPIUM BROMIDE AND ALBUTEROL SULFATE 2.5; .5 MG/3ML; MG/3ML
3 SOLUTION RESPIRATORY (INHALATION) ONCE
Status: COMPLETED | OUTPATIENT
Start: 2020-03-23 | End: 2020-03-23

## 2020-03-23 RX ORDER — PREDNISONE 20 MG/1
40 TABLET ORAL DAILY
Qty: 10 TABLET | Refills: 0 | Status: SHIPPED | OUTPATIENT
Start: 2020-03-23 | End: 2020-03-28

## 2020-03-23 RX ORDER — PREDNISONE 20 MG/1
60 TABLET ORAL ONCE
Status: COMPLETED | OUTPATIENT
Start: 2020-03-23 | End: 2020-03-23

## 2020-03-23 RX ORDER — ALBUTEROL SULFATE 90 UG/1
2 AEROSOL, METERED RESPIRATORY (INHALATION) EVERY 4 HOURS PRN
Qty: 1 INHALER | Refills: 0 | Status: SHIPPED | OUTPATIENT
Start: 2020-03-23 | End: 2020-04-22

## 2020-03-23 NOTE — TELEPHONE ENCOUNTER
Virtual/Telephone Check-In    Rosa Viera verbally consents to a Virtual/Telephone Check-In service on 3-23-20  Patient understands and accepts financial responsibility for any deductible, co-insurance and/or co-pays associated with this service.

## 2020-03-23 NOTE — TELEPHONE ENCOUNTER
Virtual/Telephone Check-In    Geeair Cooley verbally consents to a Virtual/Telephone Check-In service on 03/23/20.   Patient understands and accepts financial responsibility for any deductible, co-insurance and/or co-pays associated with this service

## 2020-03-23 NOTE — ED PROVIDER NOTES
Patient Seen in: [facility] Emergency Department      History   Patient presents with:  Dyspnea SUZANNA SOB    Stated Complaint: suzanna    HPI     CHIEF COMPLAINT: Acute asthma exacerbation    HISTORY OF PRESENT ILLNESS: Patient is a pleasant 57-year-old f above.     Past Medical History:   Diagnosis Date   • Abdominal pain    • Arrhythmia    • Arthritis    • Asthma    • Back pain    • Belching    • Bloating    • Blood in the stool    • Cardiac defibrillator in place    • CERVICAL DYSPLASIA 06/12/09    lgsil • REMOVAL OF OVARIAN CYST(S)  2002   • TUBAL LIGATION  2002                    Social History    Tobacco Use      Smoking status: Former Smoker        Packs/day: 0.80        Years: 2.00        Pack years: 1.6        Types: Cigarettes        Quit date: 3/ given a DuoNeb, 60 of prednisone and received a chest x-ray to rule out other pathology. Patient has no cardiac symptoms, shortness of breath, wheezing, swelling to the bilateral lower extremities.       Xr Chest Ap Portable  (cpt=71045)    Result Date: 3/ discharged home on prednisone, albuterol inhaler and nebulizer with ampules. Patient is comfortable with this plan. Patient is in no respiratory distress. Lung sounds are clear bilaterally, no wheezing, rhonchi or rales.   I discussed the radiology resul needed for Wheezing. Qty: 1 Inhaler Refills: 0    Nebulizer Does not apply Misc  Use every 4 hours as needed for wheezing.   Qty: 1 each Refills: 0    albuterol sulfate (2.5 MG/3ML) 0.083% Inhalation Nebu Soln  Take 3 mL (2.5 mg total) by nebulization ever

## 2020-03-26 RX ORDER — FUROSEMIDE 20 MG/1
40 TABLET ORAL DAILY
Qty: 30 TABLET | Refills: 0 | OUTPATIENT
Start: 2020-03-26

## 2020-03-27 NOTE — LETTER
12/14/21    [place] [address] [facility] [address]    Dear Shea Castro,    [address] [facility] records indicate that you have outstanding lab work. To schedule an appointment please call Central Scheduling at [phone].  You may als
26

## 2020-04-20 RX ORDER — ELAGOLIX 150 MG/1
TABLET, FILM COATED ORAL
Qty: 84 TABLET | Refills: 0 | Status: SHIPPED | OUTPATIENT
Start: 2020-04-20 | End: 2020-07-13

## 2020-05-08 RX ORDER — POTASSIUM CHLORIDE 20 MEQ/1
20 TABLET, EXTENDED RELEASE ORAL DAILY
Qty: 90 TABLET | Refills: 0 | OUTPATIENT
Start: 2020-05-08

## 2020-05-13 RX ORDER — POTASSIUM CHLORIDE 20 MEQ/1
60 TABLET, EXTENDED RELEASE ORAL DAILY
Qty: 42 TABLET | Refills: 0 | Status: SHIPPED | OUTPATIENT
Start: 2020-05-13

## 2020-05-14 NOTE — TELEPHONE ENCOUNTER
Spoke to patient and updated on progress of prior authorization. Patient concern that she has pelvic congestion symptoms that she researched on line. Will inform [facility] NP so she can call patient back. Yes

## 2020-06-11 RX ORDER — FUROSEMIDE 40 MG/1
TABLET ORAL
Qty: 90 TABLET | Refills: 0 | OUTPATIENT
Start: 2020-06-11

## 2020-07-13 RX ORDER — ELAGOLIX 150 MG/1
TABLET, FILM COATED ORAL
Qty: 84 TABLET | Refills: 0 | Status: SHIPPED | OUTPATIENT
Start: 2020-07-13 | End: 2020-10-31

## 2020-07-29 RX ORDER — PANTOPRAZOLE SODIUM 40 MG/1
TABLET, DELAYED RELEASE ORAL
Qty: 90 TABLET | Refills: 0 | Status: SHIPPED | OUTPATIENT
Start: 2020-07-29 | End: 2021-05-26

## 2020-08-17 DIAGNOSIS — R45.4 IRRITABILITY: ICD-10-CM

## 2020-08-18 NOTE — TELEPHONE ENCOUNTER
LM's for patient to call back and schedule Overdue PE apt to release refill request.  Stated to complete labs on order prior to appt.     Last OV relevant to medication: 3/25/2019 - PE, more recent Acute Visits  Last refill date: 2/17/2020     #/refills: 90

## 2020-08-19 NOTE — TELEPHONE ENCOUNTER
Ivette sent to patient to reschedule her physical, it was cancelled due to [place]. RX changed to #30.

## 2020-08-20 RX ORDER — BUPROPION HYDROCHLORIDE 300 MG/1
TABLET ORAL
Qty: 30 TABLET | Refills: 0 | Status: SHIPPED | OUTPATIENT
Start: 2020-08-20 | End: 2020-08-20

## 2020-08-20 RX ORDER — BUPROPION HYDROCHLORIDE 300 MG/1
TABLET ORAL
Qty: 90 TABLET | Refills: 0 | Status: SHIPPED | OUTPATIENT
Start: 2020-08-20 | End: 2020-11-13

## 2020-08-20 NOTE — TELEPHONE ENCOUNTER
Phone message left 2 days ago. 30ds pended with note \"needs appt. \" Routed to Dr Sonia Butterfield.

## 2020-08-20 NOTE — TELEPHONE ENCOUNTER
Received fax from pharmacy. [facility] insurance requires #90. Okay to send 90 day?

## 2020-09-10 ENCOUNTER — TELEPHONE (OUTPATIENT)
Dept: INTERNAL MEDICINE CLINIC | Facility: CLINIC | Age: 45
End: 2020-09-10

## 2020-09-10 NOTE — TELEPHONE ENCOUNTER
Patient states that she was exposed to someone who tested positive for Covid-19. Patient states that she's asymptomatic and wants to know if she should go get tested.

## 2020-10-31 RX ORDER — ELAGOLIX 150 MG/1
TABLET, FILM COATED ORAL
Qty: 84 TABLET | Refills: 0 | Status: SHIPPED | OUTPATIENT
Start: 2020-10-31 | End: 2021-02-03

## 2020-11-12 ENCOUNTER — TELEPHONE (OUTPATIENT)
Dept: INTERNAL MEDICINE CLINIC | Facility: CLINIC | Age: 45
End: 2020-11-12

## 2020-11-12 DIAGNOSIS — R45.4 IRRITABILITY: ICD-10-CM

## 2020-11-12 NOTE — TELEPHONE ENCOUNTER
[facility]+t message sent to patient to schedule PE in order to release refill request    Last OV relevant to medication: 2/17/2020  Last refill date: 8/20/2020   #/refills: 90-0  When pt was asked to return for OV: 1 month  Upcoming appt/reason: No future appointment scheduled

## 2020-11-13 RX ORDER — BUPROPION HYDROCHLORIDE 300 MG/1
TABLET ORAL
Qty: 30 TABLET | Refills: 0 | Status: SHIPPED | OUTPATIENT
Start: 2020-11-13 | End: 2020-11-17

## 2020-11-17 DIAGNOSIS — R45.4 IRRITABILITY: ICD-10-CM

## 2020-11-17 RX ORDER — BUPROPION HYDROCHLORIDE 300 MG/1
300 TABLET ORAL DAILY
Qty: 90 TABLET | Refills: 0 | Status: SHIPPED | OUTPATIENT
Start: 2020-11-17 | End: 2021-02-08

## 2020-11-21 ENCOUNTER — TELEPHONE (OUTPATIENT)
Dept: OBGYN CLINIC | Facility: CLINIC | Age: 45
End: 2020-11-21

## 2020-11-21 NOTE — TELEPHONE ENCOUNTER
PC with patient through relay station for hard of hearing. 2 days ago menses started, was heavy, dark in color and had severe cramps. A little better. Was on Orilissa for 2 years. To make appointment. Scheduled for 11/27/2020.

## 2020-11-27 ENCOUNTER — OFFICE VISIT (OUTPATIENT)
Dept: OBGYN CLINIC | Facility: CLINIC | Age: 45
End: 2020-11-27
Payer: COMMERCIAL

## 2020-11-27 VITALS
DIASTOLIC BLOOD PRESSURE: 70 MMHG | BODY MASS INDEX: 40.3 KG/M2 | HEART RATE: 78 BPM | SYSTOLIC BLOOD PRESSURE: 122 MMHG | TEMPERATURE: 97 F | RESPIRATION RATE: 16 BRPM | WEIGHT: 219 LBS | HEIGHT: 62 IN

## 2020-11-27 DIAGNOSIS — R10.2 PELVIC PAIN: Primary | ICD-10-CM

## 2020-11-27 DIAGNOSIS — N92.1 MENORRHAGIA WITH IRREGULAR CYCLE: ICD-10-CM

## 2020-11-27 DIAGNOSIS — N80.9 ENDOMETRIOSIS: ICD-10-CM

## 2020-11-27 PROCEDURE — 3008F BODY MASS INDEX DOCD: CPT | Performed by: OBSTETRICS & GYNECOLOGY

## 2020-11-27 PROCEDURE — 99072 ADDL SUPL MATRL&STAF TM PHE: CPT | Performed by: OBSTETRICS & GYNECOLOGY

## 2020-11-27 PROCEDURE — 3078F DIAST BP <80 MM HG: CPT | Performed by: OBSTETRICS & GYNECOLOGY

## 2020-11-27 PROCEDURE — 99214 OFFICE O/P EST MOD 30 MIN: CPT | Performed by: OBSTETRICS & GYNECOLOGY

## 2020-11-27 PROCEDURE — 3074F SYST BP LT 130 MM HG: CPT | Performed by: OBSTETRICS & GYNECOLOGY

## 2020-11-28 NOTE — PROGRESS NOTES
CHIEF COMPLAINT:   Patient presents with  Pelvic pain   HPI:   Armani Schofield is a 50year old  No LMP recorded. Patient is perimenopausal. who presents with severe pelvic pain.   Patient states that she has been on Orilissa for almost 2-1/2 ye no abnormal discharge, no lesions or ulcerations  cervix      closed, no CMT, no lesions or polyps  uterus      WNL size, tenderness with palpation  adnexa     no masses, pain in the adnexal area with the left greater than the right    IMPRESSION/PLAN:

## 2020-11-30 ENCOUNTER — ULTRASOUND ENCOUNTER (OUTPATIENT)
Dept: OBGYN CLINIC | Facility: CLINIC | Age: 45
End: 2020-11-30
Payer: COMMERCIAL

## 2020-12-09 DIAGNOSIS — Z13.820 SCREENING FOR OSTEOPOROSIS: Primary | ICD-10-CM

## 2020-12-09 DIAGNOSIS — N83.209 CYST OF OVARY, UNSPECIFIED LATERALITY: ICD-10-CM

## 2021-01-15 DIAGNOSIS — J45.30 MILD PERSISTENT ASTHMA WITHOUT COMPLICATION: Chronic | ICD-10-CM

## 2021-01-15 DIAGNOSIS — J45.30 MILD PERSISTENT ASTHMA WITHOUT COMPLICATION: Primary | ICD-10-CM

## 2021-01-15 DIAGNOSIS — J45.20 MILD INTERMITTENT ASTHMA WITHOUT COMPLICATION: ICD-10-CM

## 2021-01-15 RX ORDER — MONTELUKAST SODIUM 10 MG/1
10 TABLET ORAL DAILY
Qty: 90 TABLET | Refills: 1 | Status: CANCELLED | OUTPATIENT
Start: 2021-01-15

## 2021-01-15 RX ORDER — FLUTICASONE PROPIONATE AND SALMETEROL 232; 14 UG/1; UG/1
1 POWDER, METERED RESPIRATORY (INHALATION) 2 TIMES DAILY
Qty: 1 EACH | Refills: 1 | Status: CANCELLED | OUTPATIENT
Start: 2021-01-15

## 2021-01-18 NOTE — TELEPHONE ENCOUNTER
Last OV relevant to medication: 3/17/2020, ER f/u  Last refill date: 3/17/2020     #/refills: 21-0  When pt was asked to return for OV: 2 weeks  Upcoming appt/reason: None scheduled    Montelukast    Last OV relevant to medication: 3/17/2020, ER f/u  Last

## 2021-01-22 RX ORDER — FLUTICASONE PROPIONATE AND SALMETEROL 232; 14 UG/1; UG/1
1 POWDER, METERED RESPIRATORY (INHALATION) 2 TIMES DAILY
Qty: 1 EACH | Refills: 0 | Status: SHIPPED | OUTPATIENT
Start: 2021-01-22 | End: 2021-01-26

## 2021-01-22 RX ORDER — BENZONATATE 200 MG/1
200 CAPSULE ORAL 3 TIMES DAILY PRN
Qty: 21 CAPSULE | Refills: 0 | Status: SHIPPED | OUTPATIENT
Start: 2021-01-22 | End: 2021-11-02

## 2021-01-22 RX ORDER — MONTELUKAST SODIUM 10 MG/1
10 TABLET ORAL DAILY
Qty: 30 TABLET | Refills: 0 | Status: SHIPPED | OUTPATIENT
Start: 2021-01-22 | End: 2021-01-26

## 2021-01-26 RX ORDER — FLUTICASONE PROPIONATE AND SALMETEROL 232; 14 UG/1; UG/1
1 POWDER, METERED RESPIRATORY (INHALATION) 2 TIMES DAILY
Qty: 3 EACH | Refills: 0 | Status: SHIPPED | OUTPATIENT
Start: 2021-01-26 | End: 2021-01-27

## 2021-01-26 RX ORDER — MONTELUKAST SODIUM 10 MG/1
10 TABLET ORAL DAILY
Qty: 90 TABLET | Refills: 0 | Status: SHIPPED | OUTPATIENT
Start: 2021-01-26 | End: 2021-04-16

## 2021-01-27 ENCOUNTER — TELEPHONE (OUTPATIENT)
Dept: INTERNAL MEDICINE CLINIC | Facility: CLINIC | Age: 46
End: 2021-01-27

## 2021-01-27 DIAGNOSIS — J45.30 MILD PERSISTENT ASTHMA WITHOUT COMPLICATION: Primary | ICD-10-CM

## 2021-01-27 RX ORDER — FLUTICASONE PROPIONATE AND SALMETEROL XINAFOATE 230; 21 UG/1; UG/1
1 AEROSOL, METERED RESPIRATORY (INHALATION) 2 TIMES DAILY
Qty: 1 INHALER | Refills: 0 | Status: SHIPPED | OUTPATIENT
Start: 2021-01-27 | End: 2021-02-23

## 2021-01-27 NOTE — TELEPHONE ENCOUNTER
Received Refill Request for Replacement of:    FLUTICASONE-SALMETEROL 232-14 MCG/ACT INH      Replacement Options per Pharmacy is:    - Breo 200-25 Inhaler    - Advair 500-50 Inhaler    - Advair -21 Inhaler    Please Advise Which Replacement Inhaler

## 2021-01-27 NOTE — TELEPHONE ENCOUNTER
She can try advair 230-21 1 puff bid. She is overdue for an appt. Needs cpx. Okay to give her 1 inhaler but she needs an appt for cpx for further refills.

## 2021-01-27 NOTE — TELEPHONE ENCOUNTER
PSR was trying to schedule PE for patient and got disconnected. PSR tried to call back and left a message.

## 2021-01-29 NOTE — TELEPHONE ENCOUNTER
Okay to order. She is already on this in a different form. We are just giving her an alternative for an existing medication.

## 2021-01-29 NOTE — TELEPHONE ENCOUNTER
Received call from pharmacy. Pharmacy wants to clarify ADVAIR as they noted that pt is also on PROPRANOLOL by a Dr. Ministerio Saravia. See dispense report. Please advise, thanks! To call pharmacy back. Noted alternatives in initial note.

## 2021-02-01 ENCOUNTER — TELEPHONE (OUTPATIENT)
Dept: INTERNAL MEDICINE CLINIC | Facility: CLINIC | Age: 46
End: 2021-02-01

## 2021-02-02 ENCOUNTER — LAB ENCOUNTER (OUTPATIENT)
Dept: LAB | Facility: HOSPITAL | Age: 46
End: 2021-02-02
Attending: INTERNAL MEDICINE
Payer: COMMERCIAL

## 2021-02-02 ENCOUNTER — VIRTUAL PHONE E/M (OUTPATIENT)
Dept: INTERNAL MEDICINE CLINIC | Facility: CLINIC | Age: 46
End: 2021-02-02
Payer: COMMERCIAL

## 2021-02-02 DIAGNOSIS — Z20.822 SUSPECTED 2019 NOVEL CORONAVIRUS INFECTION: ICD-10-CM

## 2021-02-02 DIAGNOSIS — Z20.822 SUSPECTED 2019 NOVEL CORONAVIRUS INFECTION: Primary | ICD-10-CM

## 2021-02-02 PROCEDURE — 99213 OFFICE O/P EST LOW 20 MIN: CPT | Performed by: INTERNAL MEDICINE

## 2021-02-02 NOTE — PROGRESS NOTES
Virtual Telephone Check-In    Kailee Fisher verbally consents to a Virtual/Telephone Check-In visit on 02/02/21. Patient has been referred to the [facility] website at [email or web address] to review the yearly Consent to Treat document.     Patient un • buPROPion HCl ER, XL, 300 MG Oral Tablet 24 Hr Take 1 tablet (300 mg total) by mouth daily.  90 tablet 0   • ORILISSA 150 MG Oral Tab TAKE 1 TABLET BY MOUTH EVERY DAY 84 tablet 0   • PANTOPRAZOLE SODIUM 40 MG Oral Tab EC TAKE 1 TABLET BY MOUTH EVERY DAY 2.00 Years   Types: Cigarettes   Quit date: 3/28/2008   Smokeless tobacco: Former User       PHYSICAL EXAM:  There were no vitals taken for this visit. Pt alert and oriented x3. Unable to conduct an exam as visit with done with help of . MCV 88.7 80.0 - 100.0 fL    MCH 27.8 26.0 - 34.0 pg    MCHC 31.3 31.0 - 37.0 g/dL    RDW 15.8 (H) 11.0 - 15.0 %    RDW-SD 51.3 (H) 35.1 - 46.3 fL    Neutrophil Absolute Prelim 4.40 1.50 - 7.70 x10 (3) uL    Neutrophil Absolute 4.40 1.50 - 7.70 x10(3) uL Appropriate medical decision-making and tests are ordered as detailed in the plan of care above. Return if symptoms worsen or fail to improve.       Jose M Carlton MD

## 2021-02-03 LAB — SARS-COV-2 RNA RESP QL NAA+PROBE: NOT DETECTED

## 2021-02-03 RX ORDER — ELAGOLIX 150 MG/1
TABLET, FILM COATED ORAL
Qty: 84 TABLET | Refills: 0 | Status: SHIPPED | OUTPATIENT
Start: 2021-02-03 | End: 2021-04-16

## 2021-02-08 DIAGNOSIS — R45.4 IRRITABILITY: ICD-10-CM

## 2021-02-08 RX ORDER — BUPROPION HYDROCHLORIDE 300 MG/1
TABLET ORAL
Qty: 30 TABLET | Refills: 0 | Status: SHIPPED | OUTPATIENT
Start: 2021-02-08 | End: 2021-02-11

## 2021-02-09 DIAGNOSIS — R45.4 IRRITABILITY: ICD-10-CM

## 2021-02-09 RX ORDER — BUPROPION HYDROCHLORIDE 300 MG/1
300 TABLET ORAL DAILY
Qty: 90 TABLET | Refills: 0 | OUTPATIENT
Start: 2021-02-09

## 2021-02-11 RX ORDER — BUPROPION HYDROCHLORIDE 300 MG/1
300 TABLET ORAL DAILY
Qty: 90 TABLET | Refills: 0 | Status: SHIPPED | OUTPATIENT
Start: 2021-02-11 | End: 2021-06-07

## 2021-02-11 NOTE — TELEPHONE ENCOUNTER
Notice received that insurance requiring 90ds, - ok to fill? 30ds sent 2/8/21 - they are requesting new order be sent. May need to re-order labs if not done soon. Last OV relevant to medication: 2/17/20 irritability, multiple other visits since.   Last

## 2021-02-12 ENCOUNTER — APPOINTMENT (OUTPATIENT)
Dept: GENERAL RADIOLOGY | Facility: HOSPITAL | Age: 46
End: 2021-02-12
Attending: EMERGENCY MEDICINE
Payer: COMMERCIAL

## 2021-02-12 ENCOUNTER — HOSPITAL ENCOUNTER (EMERGENCY)
Facility: HOSPITAL | Age: 46
Discharge: HOME | End: 2021-02-12
Attending: EMERGENCY MEDICINE
Payer: COMMERCIAL

## 2021-02-12 VITALS
WEIGHT: 210 LBS | HEART RATE: 79 BPM | SYSTOLIC BLOOD PRESSURE: 115 MMHG | DIASTOLIC BLOOD PRESSURE: 79 MMHG | BODY MASS INDEX: 38.64 KG/M2 | HEIGHT: 62 IN | RESPIRATION RATE: 16 BRPM | OXYGEN SATURATION: 100 % | TEMPERATURE: 98 F

## 2021-02-12 DIAGNOSIS — M54.89 BACK PAIN WITHOUT SCIATICA: ICD-10-CM
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
## 2021-02-12 DIAGNOSIS — T07.XXXA MULTIPLE CONTUSIONS: ICD-10-CM

## 2021-02-12 DIAGNOSIS — M62.830 PARASPINAL MUSCLE SPASM: ICD-10-CM

## 2021-02-12 PROCEDURE — 72170 X-RAY EXAM OF PELVIS: CPT | Performed by: EMERGENCY MEDICINE

## 2021-02-12 PROCEDURE — 99284 EMERGENCY DEPT VISIT MOD MDM: CPT

## 2021-02-12 PROCEDURE — 73630 X-RAY EXAM OF FOOT: CPT | Performed by: EMERGENCY MEDICINE

## 2021-02-12 PROCEDURE — 72072 X-RAY EXAM THORAC SPINE 3VWS: CPT | Performed by: EMERGENCY MEDICINE

## 2021-02-12 PROCEDURE — 72100 X-RAY EXAM L-S SPINE 2/3 VWS: CPT | Performed by: EMERGENCY MEDICINE

## 2021-02-12 PROCEDURE — 73562 X-RAY EXAM OF KNEE 3: CPT | Performed by: EMERGENCY MEDICINE

## 2021-02-12 RX ORDER — ONDANSETRON 4 MG/1
4 TABLET, ORALLY DISINTEGRATING ORAL ONCE
Status: COMPLETED | OUTPATIENT
Start: 2021-02-12 | End: 2021-02-12

## 2021-02-12 RX ORDER — HYDROCODONE BITARTRATE AND ACETAMINOPHEN 5; 325 MG/1; MG/1
1 TABLET ORAL ONCE
Status: COMPLETED | OUTPATIENT
Start: 2021-02-12 | End: 2021-02-12

## 2021-02-12 RX ORDER — CYCLOBENZAPRINE HCL 10 MG
10 TABLET ORAL 3 TIMES DAILY PRN
Qty: 20 TABLET | Refills: 0 | Status: SHIPPED | OUTPATIENT
Start: 2021-02-12 | End: 2021-02-19

## 2021-02-13 NOTE — ED NOTES
This PCT help translated for Patient utilizing American Sign Language. Patient was triaged, assessed/screened and explained what procedure and testing is ordered.

## 2021-02-13 NOTE — ED INITIAL ASSESSMENT (HPI)
Pt states she fell yesterday while walking. Pain to bilateral knees and right hip. Pt denies striking head.

## 2021-02-13 NOTE — ED PROVIDER NOTES
Patient Seen in: [facility] Emergency Department      History   Patient presents with:  Leg or Foot Injury  Fall    Stated Complaint: fall/hip pain     HPI/Subjective:   HPI    History is obtained with SHAHAB Vazquez who is fluent in sign language.     Aggie Jones swelling    • Menses painful    • Mouth sores    • Night sweats    • Osteoarthritis    • Pacemaker    • Pain in joints    • Pain with bowel movements    • Prolonged QT interval syndrome    • Seizure disorder ([address] 11/2017    last Sezure 2017   • Shortness Exam    General: Alert and oriented in no distress. Neuro: CN II-XII intact. Grossly normal and symmetric motor strength and sensation proximally and distally throughout all 4 extremities. HEENT: Atraumatic exam.  No lymphadenopathy, no hemotympanum. XR FOOT, COMPLETE (MIN 3 VIEWS), LEFT (CPT=73630)   Final Result    PROCEDURE:  XR FOOT, COMPLETE (MIN 3 VIEWS), LEFT (CPT=73630)         TECHNIQUE:  AP, oblique, and lateral views were obtained. COMPARISON:  None.          INDICATIONS:  fall/ Patient provided     no additional information                 FINDINGS:      BONES:  Normal.  No significant arthropathy or acute abnormality. SOFT TISSUES:  Negative. No visible soft tissue swelling. EFFUSION:  None visible. OTHER:  Negative. visible bony lesion. DISC SPACES:  Normal.  No significant disc height narrowing, subluxation,     or endplate abnormality. PARASPINOUS:  Negative. No paraspinous abnormality is seen.      OTHER:  Negative.                                 =====    CO

## 2021-02-21 DIAGNOSIS — J45.30 MILD PERSISTENT ASTHMA WITHOUT COMPLICATION: ICD-10-CM

## 2021-02-23 RX ORDER — FLUTICASONE PROPIONATE AND SALMETEROL XINAFOATE 230; 21 UG/1; UG/1
AEROSOL, METERED RESPIRATORY (INHALATION)
Qty: 1 INHALER | Refills: 0 | Status: SHIPPED | OUTPATIENT
Start: 2021-02-23 | End: 2021-05-27

## 2021-02-23 NOTE — TELEPHONE ENCOUNTER
Last OV relevant to medication: 3/17/2020  Last refill date: 1/27/2021    #1/refills:0  When pt was asked to return for OV: 2 weeks  Upcoming appt/reason: 3/1/2021  Was pt informed of any over due labs: Yes. Via [employer].    3/17/2020 ACT-8.    3/25/19-ACT-2

## 2021-03-01 ENCOUNTER — OFFICE VISIT (OUTPATIENT)
Dept: INTERNAL MEDICINE CLINIC | Facility: CLINIC | Age: 46
End: 2021-03-01
Payer: COMMERCIAL

## 2021-03-01 VITALS
DIASTOLIC BLOOD PRESSURE: 72 MMHG | RESPIRATION RATE: 12 BRPM | WEIGHT: 219.13 LBS | BODY MASS INDEX: 39.32 KG/M2 | TEMPERATURE: 99 F | HEART RATE: 72 BPM | HEIGHT: 62.5 IN | SYSTOLIC BLOOD PRESSURE: 100 MMHG

## 2021-03-01 DIAGNOSIS — R32 URINARY INCONTINENCE, UNSPECIFIED TYPE: ICD-10-CM

## 2021-03-01 DIAGNOSIS — Z00.00 PHYSICAL EXAM, ANNUAL: ICD-10-CM

## 2021-03-01 DIAGNOSIS — J45.30 MILD PERSISTENT ASTHMA WITHOUT COMPLICATION: ICD-10-CM

## 2021-03-01 DIAGNOSIS — R56.9 SEIZURES (HCC): ICD-10-CM

## 2021-03-01 LAB
APPEARANCE: CLEAR
BILIRUBIN: NEGATIVE
GLUCOSE (URINE DIPSTICK): NEGATIVE MG/DL
KETONES (URINE DIPSTICK): NEGATIVE MG/DL
LEUKOCYTES: NEGATIVE
MULTISTIX LOT#: 1044 NUMERIC
NITRITE, URINE: NEGATIVE
OCCULT BLOOD: NEGATIVE
PH, URINE: 5.5 (ref 4.5–8)
PROTEIN (URINE DIPSTICK): NEGATIVE MG/DL
SPECIFIC GRAVITY: 1.01 (ref 1–1.03)
URINE-COLOR: YELLOW
UROBILINOGEN,SEMI-QN: 0.2 MG/DL (ref 0–1.9)

## 2021-03-01 PROCEDURE — 99396 PREV VISIT EST AGE 40-64: CPT | Performed by: INTERNAL MEDICINE

## 2021-03-01 PROCEDURE — 3078F DIAST BP <80 MM HG: CPT | Performed by: INTERNAL MEDICINE

## 2021-03-01 PROCEDURE — 81003 URINALYSIS AUTO W/O SCOPE: CPT | Performed by: INTERNAL MEDICINE

## 2021-03-01 PROCEDURE — 3008F BODY MASS INDEX DOCD: CPT | Performed by: INTERNAL MEDICINE

## 2021-03-01 PROCEDURE — 3074F SYST BP LT 130 MM HG: CPT | Performed by: INTERNAL MEDICINE

## 2021-03-01 RX ORDER — ALBUTEROL SULFATE 90 UG/1
2 AEROSOL, METERED RESPIRATORY (INHALATION) EVERY 6 HOURS PRN
COMMUNITY

## 2021-03-02 NOTE — PROGRESS NOTES
[address] [facility]    CHIEF COMPLAINT: Patient presents with:  Routine Physical: sees gyne. 6/24/13-pap. 1/15/19-mammo.  9/7/18-colon-repeat 5 years  Asthma: ACT-22  Urinary: urinary incontinence  Medication Follow-Up: Taking furosemide differently than r (three) times daily as needed for cough.  21 capsule 0   • PANTOPRAZOLE SODIUM 40 MG Oral Tab EC TAKE 1 TABLET BY MOUTH EVERY DAY IN THE MORNING BEFORE BREAKFAST 90 tablet 0   • albuterol sulfate (2.5 MG/3ML) 0.083% Inhalation Nebu Soln Take 3 mL (2.5 mg to not always have symptoms   • Endometriosis    • Enlarged thyroid    • Fatigue    • Flatulence/gas pain/belching    • Frequent urination    • Gastric ulcer, unspecified as acute or chronic, without mention of hemorrhage or perforation    • Hearing impairmen • Stroke Mother         massive stroke 2003 cause of death  - seizure   • Other (Other) Mother         hemochromatosis/ cva liver disorder   • Heart Attack Paternal Grandfather          1974   • Cancer Maternal Grandmother         Lung   • normocephalic,ears and throat are clear  EYES:PERRLA, conjunctiva are clear  NECK: supple,no adenopathy,no bruits  CHEST: no chest tenderness  LUNGS: clear to auscultation  CARDIO: nl s1 and s2, RRR without murmur  GI: good BS's,no masses, HSM or tendernes 25-HYDROXY; Future    2. Urinary incontinence, unspecified type  ua negative. Urge incontinence. Refer to urogyne for evaluation.   - URINALYSIS, AUTO, W/O SCOPE  - OP REFERRAL TO UROGYNECOLOGY CLINIC    3. Seizures ([address]  Continue dilantin.  follow up

## 2021-03-26 NOTE — TELEPHONE ENCOUNTER
[facility]    PROGRESS NOTE    Patient:  Astrid Dueñas    :  1990    Date of Service:  3/18/2021    The encounter diagnosis was Generalized anxiety disorder.    Data:  Astrid continues to process grief around her brother's Bipolar Disorder, how his last manic episode ended with him being incarcerated, and trying to maintain boundaries with her family.    Intervention:  Cognitive Behavioral Strategies and Grief/Loss Counseling    Patient continues to be involved in service planning:  YES    Describe above interventions:  Clinician provided active listening, validation, and empathy. Clinician provided ongoing grief counseling around brother's diagnosis and family's dysfunction. Clinician continues to work on empowering Astrid to create and maintain healthy boundaries to heal guilt and codependency.    Patient's response to interventions:  Astrid presents as AOx4. She seemed euthymic at end of session and presents as future and goal-oriented, scheduling follow-up session with clinician.    Continue to support patient's efforts and progress towards established treatment plan goals in the following ways:  Provide support through individual therapy to further the healing process.    Off-site:  No     This visit is being performed via video to discuss Anxiety and Video Visit    Clinician Location: [facility]    Astrid is in [place] and her identity has been established.   She was informed that consent to treat includes permission to submit charges to the applicable insurance on file. Astrid was advised regarding the potential risk inherent in video visits, as the assessment may be limited due to what can be seen on the screen which potentially results in an incomplete assessment; as well as either of us may discontinue the video visit if it is felt that the videoconferencing connections are not adequate for his/her situation.   50 minutes were spent in this  Telephone visit due to  services needed     Virtual/Telephone Consent    Collins Anderson verbally {consents to a Virtual/Telephone Check-In service on 2/2/21  Patient understands and accepts financial responsibility for any deductible, co-i encounter.

## 2021-04-15 DIAGNOSIS — J45.20 MILD INTERMITTENT ASTHMA WITHOUT COMPLICATION: ICD-10-CM

## 2021-04-16 RX ORDER — ELAGOLIX 150 MG/1
TABLET, FILM COATED ORAL
Qty: 84 TABLET | Refills: 0 | Status: SHIPPED | OUTPATIENT
Start: 2021-04-16 | End: 2021-07-12

## 2021-04-16 RX ORDER — MONTELUKAST SODIUM 10 MG/1
TABLET ORAL
Qty: 90 TABLET | Refills: 0 | Status: SHIPPED | OUTPATIENT
Start: 2021-04-16 | End: 2021-07-21

## 2021-05-05 NOTE — PROGRESS NOTES
[facility]  PRE OP RISK ASSESSMENT    REASON FOR CONSULT: Pre-op risk assessment for surgical procedure: total abdominal hysterectomy planned for 8/9/18 with general anesthesia.     REQUESTING PHYSICIAN: Dr. Ranjeet Wadsworth: Patient presen Osteoarthritis    • Pacemaker         Past Surgical History:    Past Surgical History:  No date: CU (CHRONIC URTICARIA) INDEX  No date: ORAL SURGERY PROCEDURE      Comment: wisdom teeth  No date: OTHER SURGICAL HISTORY      Comment: bunion  No date: OTHER experiencing allergy sx. Disp:  Rfl:         Allergies:     Allergies As of Date: 07/30/2018  Allergen                          Noted       Reaction  CODEINE PHOSPHATE                 04/06/2009    EPHEDRINE                         08/25/2011    LEVALEXANDREUIN ephzema        REVIEW OF SYSTEMS:  GENERAL: feels well otherwise  SKIN: denies any unusual skin lesions  HEENT: denies blurred vision or double vision denies nasal congestion  LUNGS: as above  CARDIOVASCULAR: as above  GI: as above  : as above  MUSCULOSK small sip of water.   - ELECTROCARDIOGRAM, COMPLETE    5. Pacemaker  Stable. EKG in office today with atrial pacing and continued prolonged QT interval, no acute changes. 6. Seizure disorder ([address]  Stable. Continue current management.  Instructed not t 98

## 2021-05-26 DIAGNOSIS — J45.30 MILD PERSISTENT ASTHMA WITHOUT COMPLICATION: ICD-10-CM

## 2021-05-26 RX ORDER — FLUTICASONE PROPIONATE AND SALMETEROL XINAFOATE 230; 21 UG/1; UG/1
1 AEROSOL, METERED RESPIRATORY (INHALATION) 2 TIMES DAILY
Refills: 0 | Status: CANCELLED | OUTPATIENT
Start: 2021-05-26

## 2021-05-27 RX ORDER — PANTOPRAZOLE SODIUM 40 MG/1
40 TABLET, DELAYED RELEASE ORAL
Qty: 30 TABLET | Refills: 0 | Status: SHIPPED | OUTPATIENT
Start: 2021-05-27 | End: 2021-09-13

## 2021-05-27 RX ORDER — FLUTICASONE PROPIONATE AND SALMETEROL XINAFOATE 230; 21 UG/1; UG/1
1 AEROSOL, METERED RESPIRATORY (INHALATION) 2 TIMES DAILY
Qty: 1 EACH | Refills: 0 | Status: SHIPPED | OUTPATIENT
Start: 2021-05-27 | End: 2021-07-21

## 2021-05-27 NOTE — TELEPHONE ENCOUNTER
Last OV relevant to medication: 3/01/2021  Last refill date: 7/29/2020  #/refills: 90-0  When pt was asked to return for OV: 6 Months around 6/1/2021 for a medication check  Upcoming appt/reason: None scheduled   Was pt informed of any over due labs:  Yes M

## 2021-05-27 NOTE — TELEPHONE ENCOUNTER
Months from March is not June its September. Patient is not due for an appointment yet. Prescription sent. Does need labs done. Thank you for reminding the patient. 30-day supply sent. Labs should be done before then. Thank you.

## 2021-06-03 DIAGNOSIS — R45.4 IRRITABILITY: ICD-10-CM

## 2021-06-07 RX ORDER — BUPROPION HYDROCHLORIDE 300 MG/1
300 TABLET ORAL DAILY
Qty: 90 TABLET | Refills: 0 | Status: SHIPPED | OUTPATIENT
Start: 2021-06-07 | End: 2021-08-30

## 2021-06-07 NOTE — TELEPHONE ENCOUNTER
Last OV relevant to medication: 3/1/21  Last refill date: 2/11/21     #/refills: 90/0  When pt was asked to return for OV: Sept 2021  Upcoming appt/reason: none  Was pt informed of any over due labs: reminder sent via [employer]

## 2021-06-29 ENCOUNTER — APPOINTMENT (OUTPATIENT)
Dept: GENERAL RADIOLOGY | Age: 46
End: 2021-06-29
Attending: PHYSICIAN ASSISTANT
Payer: COMMERCIAL

## 2021-06-29 ENCOUNTER — HOSPITAL ENCOUNTER (OUTPATIENT)
Age: 46
Discharge: HOME | End: 2021-06-29
Payer: COMMERCIAL

## 2021-06-29 VITALS
HEIGHT: 62 IN | TEMPERATURE: 99 F | HEART RATE: 85 BPM | OXYGEN SATURATION: 98 % | BODY MASS INDEX: 38.64 KG/M2 | WEIGHT: 210 LBS | DIASTOLIC BLOOD PRESSURE: 81 MMHG | SYSTOLIC BLOOD PRESSURE: 119 MMHG | RESPIRATION RATE: 16 BRPM

## 2021-06-29 DIAGNOSIS — R06.2 WHEEZING: ICD-10-CM

## 2021-06-29 DIAGNOSIS — J06.9 VIRAL UPPER RESPIRATORY TRACT INFECTION: Primary | ICD-10-CM

## 2021-06-29 DIAGNOSIS — J45.901 ASTHMA EXACERBATION, MILD: ICD-10-CM

## 2021-06-29 DIAGNOSIS — J98.11 ATELECTASIS, LEFT: ICD-10-CM

## 2021-06-29 DIAGNOSIS — R05.9 COUGH: ICD-10-CM

## 2021-06-29 DIAGNOSIS — Z20.822 LAB TEST NEGATIVE FOR COVID-19 VIRUS: ICD-10-CM

## 2021-06-29 LAB
S PYO AG THROAT QL: NEGATIVE
SARS-COV-2 RNA RESP QL NAA+PROBE: NOT DETECTED

## 2021-06-29 PROCEDURE — 94640 AIRWAY INHALATION TREATMENT: CPT | Performed by: PHYSICIAN ASSISTANT

## 2021-06-29 PROCEDURE — 71046 X-RAY EXAM CHEST 2 VIEWS: CPT | Performed by: PHYSICIAN ASSISTANT

## 2021-06-29 PROCEDURE — 87880 STREP A ASSAY W/OPTIC: CPT | Performed by: PHYSICIAN ASSISTANT

## 2021-06-29 PROCEDURE — 99214 OFFICE O/P EST MOD 30 MIN: CPT | Performed by: PHYSICIAN ASSISTANT

## 2021-06-29 PROCEDURE — U0002 COVID-19 LAB TEST NON-CDC: HCPCS | Performed by: PHYSICIAN ASSISTANT

## 2021-06-29 RX ORDER — PREDNISONE 20 MG/1
40 TABLET ORAL DAILY
Qty: 8 TABLET | Refills: 0 | Status: SHIPPED | OUTPATIENT
Start: 2021-06-30 | End: 2021-07-04

## 2021-06-29 RX ORDER — ALBUTEROL SULFATE 2.5 MG/3ML
2.5 SOLUTION RESPIRATORY (INHALATION) EVERY 4 HOURS PRN
Qty: 30 EACH | Refills: 0 | Status: SHIPPED | OUTPATIENT
Start: 2021-06-29 | End: 2021-07-29

## 2021-06-29 RX ORDER — IPRATROPIUM BROMIDE AND ALBUTEROL SULFATE 2.5; .5 MG/3ML; MG/3ML
3 SOLUTION RESPIRATORY (INHALATION) ONCE
Status: COMPLETED | OUTPATIENT
Start: 2021-06-29 | End: 2021-06-29

## 2021-06-29 RX ORDER — PREDNISONE 20 MG/1
60 TABLET ORAL ONCE
Status: COMPLETED | OUTPATIENT
Start: 2021-06-29 | End: 2021-06-29

## 2021-06-29 NOTE — ED PROVIDER NOTES
Patient Seen in: [facility] [address] [place], [place] [address]      History   Patient presents with:  Sore Throat    Stated Complaint: cough/sore throat    HPI/Subjective:   HPI    47-year-old female who comes in today complaining of sore throat that she woke up with syndrome    • [facility] 11[facility]2017    last Sezure 2017   • Shortness of breath    • Sleep apnea    • Sleep disturbance    • Stress    • Visual impairment     glasses   • Weight loss               Past Surgical History:   Procedure Laterality Date distress, appropriate for age   Head: Normocephalic, without obvious abnormality   Eyes: PERRL,  conjunctiva and cornea clear, both eyes   Ears: TM pearly gray color and semitransparent, external ear canals normal, both ears   Nose: Nares symmetrical, sept by (CST): Asha Purcell MD on 6/29/2021 at 12:49 PM            MDM      I have personally reviewed the XRAY  images and agree with the radiologist report.     Patient was given prednisone here along with a DuoNeb after confirmed Covid vaccinated negative C regarding her diagnosis, expectations, follow up, and return to the ER precautions. I explained to the patient that emergent conditions may arise to return to the immediate care or ER for new, worsening or any persistent conditions.   I've explained the im

## 2021-06-29 NOTE — ED INITIAL ASSESSMENT (HPI)
Since yesterday, c/o sore throat and a productive cough. Denies fevers. Recent travel and is fully vaccinated.

## 2021-07-12 RX ORDER — ELAGOLIX 150 MG/1
TABLET, FILM COATED ORAL
Qty: 84 TABLET | Refills: 0 | Status: SHIPPED | OUTPATIENT
Start: 2021-07-12 | End: 2021-11-02

## 2021-07-19 DIAGNOSIS — J45.20 MILD INTERMITTENT ASTHMA WITHOUT COMPLICATION: ICD-10-CM

## 2021-07-20 DIAGNOSIS — J45.30 MILD PERSISTENT ASTHMA WITHOUT COMPLICATION: ICD-10-CM

## 2021-07-20 RX ORDER — FLUTICASONE PROPIONATE AND SALMETEROL XINAFOATE 230; 21 UG/1; UG/1
1 AEROSOL, METERED RESPIRATORY (INHALATION) 2 TIMES DAILY
Qty: 1 EACH | Refills: 1 | Status: CANCELLED | OUTPATIENT
Start: 2021-07-20

## 2021-07-21 RX ORDER — MONTELUKAST SODIUM 10 MG/1
TABLET ORAL
Qty: 90 TABLET | Refills: 0 | Status: SHIPPED | OUTPATIENT
Start: 2021-07-21 | End: 2021-07-23

## 2021-07-21 RX ORDER — FLUTICASONE PROPIONATE AND SALMETEROL XINAFOATE 230; 21 UG/1; UG/1
1 AEROSOL, METERED RESPIRATORY (INHALATION) 2 TIMES DAILY
Qty: 1 EACH | Refills: 0 | Status: SHIPPED | OUTPATIENT
Start: 2021-07-21 | End: 2021-07-23

## 2021-07-21 NOTE — TELEPHONE ENCOUNTER
90-0 sent d/t pt being asked at physical to return around 9/1/2021 for a medication check    [employer] message sent to pt as a reminder to schedule med check.

## 2021-07-23 DIAGNOSIS — J45.20 MILD INTERMITTENT ASTHMA WITHOUT COMPLICATION: ICD-10-CM

## 2021-07-23 DIAGNOSIS — J45.30 MILD PERSISTENT ASTHMA WITHOUT COMPLICATION: ICD-10-CM

## 2021-07-23 RX ORDER — MONTELUKAST SODIUM 10 MG/1
10 TABLET ORAL DAILY
Qty: 90 TABLET | Refills: 0 | Status: SHIPPED | OUTPATIENT
Start: 2021-07-23 | End: 2021-12-30

## 2021-07-23 RX ORDER — FLUTICASONE PROPIONATE AND SALMETEROL XINAFOATE 230; 21 UG/1; UG/1
1 AEROSOL, METERED RESPIRATORY (INHALATION) 2 TIMES DAILY
Qty: 1 EACH | Refills: 0 | Status: SHIPPED | OUTPATIENT
Start: 2021-07-23 | End: 2021-08-02

## 2021-07-26 RX ORDER — ELAGOLIX 150 MG/1
1 TABLET, FILM COATED ORAL DAILY
Qty: 84 TABLET | Refills: 0 | OUTPATIENT
Start: 2021-07-26

## 2021-07-30 ENCOUNTER — TELEPHONE (OUTPATIENT)
Dept: INTERNAL MEDICINE CLINIC | Facility: CLINIC | Age: 46
End: 2021-07-30

## 2021-07-30 DIAGNOSIS — Z01.84 IMMUNITY STATUS TESTING: Primary | ICD-10-CM

## 2021-07-30 NOTE — TELEPHONE ENCOUNTER
Pt was wondering if she can get the antibody test ordered for her. Pt read an article about people with an auto immune prblems that the vaccine does not protect you as much. Pt would like to know if she is still ok.    Please order covid antibody test   Myc

## 2021-08-02 DIAGNOSIS — J45.30 MILD PERSISTENT ASTHMA WITHOUT COMPLICATION: ICD-10-CM

## 2021-08-04 RX ORDER — FLUTICASONE PROPIONATE AND SALMETEROL XINAFOATE 230; 21 UG/1; UG/1
1 AEROSOL, METERED RESPIRATORY (INHALATION) 2 TIMES DAILY
Qty: 3 EACH | Refills: 0 | Status: SHIPPED | OUTPATIENT
Start: 2021-08-04 | End: 2021-09-22

## 2021-08-04 NOTE — TELEPHONE ENCOUNTER
Msg sent to pt regarding labs. Pharmacy requesting 90ds due to insurance coverage. Okay to order 90ds?     Last OV relevant to medication: 3/1/91  Last refill date: 7/23/21     #/refills: 1/0  When pt was asked to return for OV: 6 months  Upcoming appt/joe

## 2021-08-05 NOTE — TELEPHONE ENCOUNTER
What autoimmune condition is she referring to? I do not see any on her problem list nor do I see any immunosuppressants on her med list.   Antibody testing after vaccination is not recommended by the cdc.  She should continue to follow cdc guidelines for pr

## 2021-08-05 NOTE — TELEPHONE ENCOUNTER
Dr. Michelle Werner please see note below  [id number] Sangeetha Null to order antibody test?  Pt had Covid vaccine March/April 2021  Order pended for approval    To send [employer] message to pt when signed

## 2021-08-06 ENCOUNTER — LAB ENCOUNTER (OUTPATIENT)
Dept: LAB | Facility: HOSPITAL | Age: 46
End: 2021-08-06
Attending: INTERNAL MEDICINE
Payer: COMMERCIAL

## 2021-08-06 DIAGNOSIS — Z00.00 PHYSICAL EXAM, ANNUAL: ICD-10-CM

## 2021-08-06 LAB
ALBUMIN SERPL-MCNC: 3.4 G/DL (ref 3.4–5)
ALBUMIN/GLOB SERPL: 0.8 {RATIO} (ref 1–2)
ALP LIVER SERPL-CCNC: 145 U/L
ALT SERPL-CCNC: 51 U/L
ANION GAP SERPL CALC-SCNC: 2 MMOL/L (ref 0–18)
AST SERPL-CCNC: 30 U/L (ref 15–37)
BASOPHILS # BLD AUTO: 0.06 X10(3) UL (ref 0–0.2)
BASOPHILS NFR BLD AUTO: 0.9 %
BILIRUB SERPL-MCNC: 0.5 MG/DL (ref 0.1–2)
BUN BLD-MCNC: 14 MG/DL (ref 7–18)
CALCIUM BLD-MCNC: 8.9 MG/DL (ref 8.5–10.1)
CHLORIDE SERPL-SCNC: 102 MMOL/L (ref 98–112)
CHOLEST SMN-MCNC: 245 MG/DL (ref ?–200)
CO2 SERPL-SCNC: 32 MMOL/L (ref 21–32)
CREAT BLD-MCNC: 0.69 MG/DL
EOSINOPHIL # BLD AUTO: 0.43 X10(3) UL (ref 0–0.7)
EOSINOPHIL NFR BLD AUTO: 6.1 %
ERYTHROCYTE [DISTWIDTH] IN BLOOD BY AUTOMATED COUNT: 15.4 %
EST. AVERAGE GLUCOSE BLD GHB EST-MCNC: 120 MG/DL (ref 68–126)
GLOBULIN PLAS-MCNC: 4.4 G/DL (ref 2.8–4.4)
GLUCOSE BLD-MCNC: 84 MG/DL (ref 70–99)
HBA1C MFR BLD HPLC: 5.8 % (ref ?–5.7)
HCT VFR BLD AUTO: 38.3 %
HDLC SERPL-MCNC: 79 MG/DL (ref 40–59)
HGB BLD-MCNC: 11.6 G/DL
IMM GRANULOCYTES # BLD AUTO: 0.02 X10(3) UL (ref 0–1)
IMM GRANULOCYTES NFR BLD: 0.3 %
LDLC SERPL CALC-MCNC: 156 MG/DL (ref ?–100)
LYMPHOCYTES # BLD AUTO: 2.82 X10(3) UL (ref 1–4)
LYMPHOCYTES NFR BLD AUTO: 40.3 %
M PROTEIN MFR SERPL ELPH: 7.8 G/DL (ref 6.4–8.2)
MCH RBC QN AUTO: 25.9 PG (ref 26–34)
MCHC RBC AUTO-ENTMCNC: 30.3 G/DL (ref 31–37)
MCV RBC AUTO: 85.5 FL
MONOCYTES # BLD AUTO: 0.76 X10(3) UL (ref 0.1–1)
MONOCYTES NFR BLD AUTO: 10.9 %
NEUTROPHILS # BLD AUTO: 2.91 X10 (3) UL (ref 1.5–7.7)
NEUTROPHILS # BLD AUTO: 2.91 X10(3) UL (ref 1.5–7.7)
NEUTROPHILS NFR BLD AUTO: 41.5 %
NONHDLC SERPL-MCNC: 166 MG/DL (ref ?–130)
OSMOLALITY SERPL CALC.SUM OF ELEC: 282 MOSM/KG (ref 275–295)
PATIENT FASTING Y/N/NP: NO
PATIENT FASTING Y/N/NP: NO
PLATELET # BLD AUTO: 237 10(3)UL (ref 150–450)
POTASSIUM SERPL-SCNC: 3.9 MMOL/L (ref 3.5–5.1)
RBC # BLD AUTO: 4.48 X10(6)UL
SODIUM SERPL-SCNC: 136 MMOL/L (ref 136–145)
TRIGL SERPL-MCNC: 61 MG/DL (ref 30–149)
TSI SER-ACNC: 1.34 MIU/ML (ref 0.36–3.74)
VIT D+METAB SERPL-MCNC: 32.5 NG/ML (ref 30–100)
VLDLC SERPL CALC-MCNC: 12 MG/DL (ref 0–30)
WBC # BLD AUTO: 7 X10(3) UL (ref 4–11)

## 2021-08-06 PROCEDURE — 82728 ASSAY OF FERRITIN: CPT | Performed by: INTERNAL MEDICINE

## 2021-08-06 PROCEDURE — 84443 ASSAY THYROID STIM HORMONE: CPT

## 2021-08-06 PROCEDURE — 36415 COLL VENOUS BLD VENIPUNCTURE: CPT

## 2021-08-06 PROCEDURE — 83550 IRON BINDING TEST: CPT | Performed by: INTERNAL MEDICINE

## 2021-08-06 PROCEDURE — 82306 VITAMIN D 25 HYDROXY: CPT

## 2021-08-06 PROCEDURE — 83036 HEMOGLOBIN GLYCOSYLATED A1C: CPT

## 2021-08-06 PROCEDURE — 80053 COMPREHEN METABOLIC PANEL: CPT

## 2021-08-06 PROCEDURE — 83540 ASSAY OF IRON: CPT | Performed by: INTERNAL MEDICINE

## 2021-08-06 PROCEDURE — 85025 COMPLETE CBC W/AUTO DIFF WBC: CPT

## 2021-08-06 PROCEDURE — 80061 LIPID PANEL: CPT

## 2021-08-09 DIAGNOSIS — D64.9 ANEMIA, UNSPECIFIED TYPE: Primary | ICD-10-CM

## 2021-08-09 LAB
DEPRECATED HBV CORE AB SER IA-ACNC: 9.5 NG/ML
IRON SATURATION: 10 %
IRON SERPL-MCNC: 44 UG/DL
TOTAL IRON BINDING CAPACITY: 425 UG/DL (ref 240–450)
TRANSFERRIN SERPL-MCNC: 285 MG/DL (ref 200–360)

## 2021-08-13 ENCOUNTER — TELEPHONE (OUTPATIENT)
Dept: INTERNAL MEDICINE CLINIC | Facility: CLINIC | Age: 46
End: 2021-08-13

## 2021-08-13 DIAGNOSIS — D50.8 OTHER IRON DEFICIENCY ANEMIA: Primary | ICD-10-CM

## 2021-08-13 RX ORDER — FERROUS SULFATE 325(65) MG
325 TABLET ORAL
Refills: 0 | COMMUNITY
Start: 2021-08-13

## 2021-08-13 NOTE — TELEPHONE ENCOUNTER
Spoke with  and pt on the other line    Lab results and recommendations reviewed  Pt stated she has information on GI provider and will contact them  Advised on Iron supplement Ferrous Sulfate 325 mg OTC twice daily with food and vit c  Pt v/u

## 2021-08-28 DIAGNOSIS — R45.4 IRRITABILITY: ICD-10-CM

## 2021-08-30 RX ORDER — BUPROPION HYDROCHLORIDE 300 MG/1
300 TABLET ORAL DAILY
Qty: 90 TABLET | Refills: 0 | Status: SHIPPED | OUTPATIENT
Start: 2021-08-30 | End: 2021-12-01

## 2021-08-30 NOTE — TELEPHONE ENCOUNTER
Last OV relevant to medication: 3/1/21  Last refill date: 6/7/21     #/refills: 90/0  When pt was asked to return for OV: 6 months  Upcoming appt/reason: 9/3/21, med check  Was pt informed of any over due labs: none

## 2021-09-03 ENCOUNTER — OFFICE VISIT (OUTPATIENT)
Dept: INTERNAL MEDICINE CLINIC | Facility: CLINIC | Age: 46
End: 2021-09-03
Payer: COMMERCIAL

## 2021-09-03 VITALS
DIASTOLIC BLOOD PRESSURE: 70 MMHG | SYSTOLIC BLOOD PRESSURE: 110 MMHG | HEART RATE: 76 BPM | RESPIRATION RATE: 12 BRPM | BODY MASS INDEX: 40.28 KG/M2 | HEIGHT: 62 IN | WEIGHT: 218.88 LBS | TEMPERATURE: 98 F

## 2021-09-03 DIAGNOSIS — F39 MOOD DISORDER (HCC): ICD-10-CM

## 2021-09-03 DIAGNOSIS — D50.9 IRON DEFICIENCY ANEMIA, UNSPECIFIED IRON DEFICIENCY ANEMIA TYPE: ICD-10-CM

## 2021-09-03 DIAGNOSIS — K21.9 GASTROESOPHAGEAL REFLUX DISEASE, UNSPECIFIED WHETHER ESOPHAGITIS PRESENT: ICD-10-CM

## 2021-09-03 DIAGNOSIS — N80.9 ENDOMETRIOSIS: ICD-10-CM

## 2021-09-03 DIAGNOSIS — J45.30 MILD PERSISTENT ASTHMA WITHOUT COMPLICATION: ICD-10-CM

## 2021-09-03 DIAGNOSIS — G40.909 SEIZURE DISORDER (HCC): Primary | ICD-10-CM

## 2021-09-03 DIAGNOSIS — I45.81 LONG Q-T SYNDROME: ICD-10-CM

## 2021-09-03 PROCEDURE — 3008F BODY MASS INDEX DOCD: CPT | Performed by: INTERNAL MEDICINE

## 2021-09-03 PROCEDURE — 3074F SYST BP LT 130 MM HG: CPT | Performed by: INTERNAL MEDICINE

## 2021-09-03 PROCEDURE — 99214 OFFICE O/P EST MOD 30 MIN: CPT | Performed by: INTERNAL MEDICINE

## 2021-09-03 PROCEDURE — 3078F DIAST BP <80 MM HG: CPT | Performed by: INTERNAL MEDICINE

## 2021-09-03 NOTE — PROGRESS NOTES
[facility]    CHIEF COMPLAINT:  Patient presents with:  Medication Follow-Up: 6/24/13-pap. 1/15/19-mammo. 9/7/18-colon-repeat 5 years        HISTORY OF PRESENT ILLNESS:  Here for med check. Seizure disorder ([facility]): on phenytoin.  Has not seen chrissy (four) hours as needed for Wheezing. 60 ampule 0   • Metoclopramide HCl 5 MG Oral Tab Take 1 tablet (5 mg total) by mouth 3 (three) times daily before meals. 15 tablet 0   • magnesium oxide 400 MG Oral Tab Take 1 tablet by mouth 2 (two) times daily.      • DATA:  Results for orders placed or performed in visit on 08/09/21   FERRITIN   Result Value Ref Range    Ferritin 9.5 (L) 12.0 - 240.0 ng/mL   IRON AND TIBC   Result Value Ref Range    Iron 44 (L) 50 - 170 ug/dL    Transferrin 285 200 - 360 mg/dL    T

## 2021-09-13 RX ORDER — PANTOPRAZOLE SODIUM 40 MG/1
TABLET, DELAYED RELEASE ORAL
Qty: 90 TABLET | Refills: 1 | Status: SHIPPED | OUTPATIENT
Start: 2021-09-13

## 2021-09-13 NOTE — TELEPHONE ENCOUNTER
Last OV relevant to medication: 9/3/21  Last refill date:5/27/21     #/refills:090/0  When pt was asked to return for OV: 6 months  Upcoming appt/reason: 6 months  Was pt informed of any over due labs: not yet due    Last OV note states pt tolerating omepr

## 2021-09-20 ENCOUNTER — TELEPHONE (OUTPATIENT)
Dept: OBGYN CLINIC | Facility: CLINIC | Age: 46
End: 2021-09-20

## 2021-09-20 NOTE — TELEPHONE ENCOUNTER
Patient requested an appointment through  Marty solo reached oot to patient to schedule.    No answer, Left message

## 2021-09-21 DIAGNOSIS — J45.30 MILD PERSISTENT ASTHMA WITHOUT COMPLICATION: ICD-10-CM

## 2021-09-22 RX ORDER — FLUTICASONE PROPIONATE AND SALMETEROL XINAFOATE 230; 21 UG/1; UG/1
AEROSOL, METERED RESPIRATORY (INHALATION)
Qty: 8 EACH | Refills: 0 | Status: SHIPPED | OUTPATIENT
Start: 2021-09-22

## 2021-10-04 ENCOUNTER — OFFICE VISIT (OUTPATIENT)
Dept: INTERNAL MEDICINE CLINIC | Facility: CLINIC | Age: 46
End: 2021-10-04
Payer: COMMERCIAL

## 2021-10-04 VITALS
HEIGHT: 62 IN | RESPIRATION RATE: 16 BRPM | SYSTOLIC BLOOD PRESSURE: 120 MMHG | TEMPERATURE: 99 F | BODY MASS INDEX: 39.44 KG/M2 | DIASTOLIC BLOOD PRESSURE: 72 MMHG | HEART RATE: 84 BPM | WEIGHT: 214.31 LBS

## 2021-10-04 DIAGNOSIS — Z23 NEED FOR VACCINATION: ICD-10-CM

## 2021-10-04 DIAGNOSIS — N95.0 POSTMENOPAUSAL BLEEDING: ICD-10-CM

## 2021-10-04 PROCEDURE — 90471 IMMUNIZATION ADMIN: CPT | Performed by: INTERNAL MEDICINE

## 2021-10-04 PROCEDURE — 90686 IIV4 VACC NO PRSV 0.5 ML IM: CPT | Performed by: INTERNAL MEDICINE

## 2021-10-04 PROCEDURE — 99213 OFFICE O/P EST LOW 20 MIN: CPT | Performed by: INTERNAL MEDICINE

## 2021-10-04 PROCEDURE — 3008F BODY MASS INDEX DOCD: CPT | Performed by: INTERNAL MEDICINE

## 2021-10-04 PROCEDURE — 3074F SYST BP LT 130 MM HG: CPT | Performed by: INTERNAL MEDICINE

## 2021-10-04 PROCEDURE — 3078F DIAST BP <80 MM HG: CPT | Performed by: INTERNAL MEDICINE

## 2021-10-04 NOTE — PROGRESS NOTES
[facility]    CHIEF COMPLAINT:  Patient presents with:  Vaginal Bleeding: last thur had cramping and bleeding. LMP 6/24/13-pap. 1/25/19-mammo.  Has appt with gyne, Dr. Naina Cramer, 10/11/21        HISTORY OF PRESENT ILLNESS:  Complains of vaginal bleedin docusate sodium 100 MG Oral Cap Take 1 capsule (100 mg total) by mouth 2 (two) times daily.  60 capsule 5   • PHENYTOIN SODIUM EXTENDED 100 MG Oral Cap TAKE 4 CAPSULES BY MOUTH DAILY 120 capsule 5   • ibuprofen 200 MG Oral Tab Take 400 mg by mouth every 6 ( ASSESSMENT AND PLAN:  1. Postmenopausal bleeding  Will check pelvic us. follow up with gyne as planned. - US PELVIS EV W DOPPLER (CPT=76856/14509/12577); Future    2.  Need for vaccination  - FLULAVAL INFLUENZA VACCINE QUAD PRESERVATIVE FREE 0.5

## 2021-10-11 ENCOUNTER — HOSPITAL ENCOUNTER (OUTPATIENT)
Dept: BONE DENSITY | Age: 46
Discharge: HOME | End: 2021-10-11
Attending: OBSTETRICS & GYNECOLOGY
Payer: COMMERCIAL

## 2021-10-11 ENCOUNTER — OFFICE VISIT (OUTPATIENT)
Dept: OBGYN CLINIC | Facility: CLINIC | Age: 46
End: 2021-10-11
Payer: COMMERCIAL

## 2021-10-11 VITALS
DIASTOLIC BLOOD PRESSURE: 64 MMHG | SYSTOLIC BLOOD PRESSURE: 113 MMHG | BODY MASS INDEX: 39.01 KG/M2 | HEART RATE: 100 BPM | HEIGHT: 62 IN | WEIGHT: 212 LBS

## 2021-10-11 DIAGNOSIS — Z01.419 ENCOUNTER FOR ANNUAL ROUTINE GYNECOLOGICAL EXAMINATION: Primary | ICD-10-CM

## 2021-10-11 DIAGNOSIS — Z11.51 SCREENING FOR HUMAN PAPILLOMAVIRUS: ICD-10-CM

## 2021-10-11 DIAGNOSIS — Z13.820 SCREENING FOR OSTEOPOROSIS: ICD-10-CM

## 2021-10-11 DIAGNOSIS — Z12.4 SCREENING FOR MALIGNANT NEOPLASM OF CERVIX: ICD-10-CM

## 2021-10-11 DIAGNOSIS — Z12.31 BREAST CANCER SCREENING BY MAMMOGRAM: ICD-10-CM

## 2021-10-11 PROCEDURE — 88175 CYTOPATH C/V AUTO FLUID REDO: CPT | Performed by: OBSTETRICS & GYNECOLOGY

## 2021-10-11 PROCEDURE — 77080 DXA BONE DENSITY AXIAL: CPT | Performed by: OBSTETRICS & GYNECOLOGY

## 2021-10-11 PROCEDURE — 3008F BODY MASS INDEX DOCD: CPT | Performed by: OBSTETRICS & GYNECOLOGY

## 2021-10-11 PROCEDURE — 87624 HPV HI-RISK TYP POOLED RSLT: CPT | Performed by: OBSTETRICS & GYNECOLOGY

## 2021-10-11 PROCEDURE — 99396 PREV VISIT EST AGE 40-64: CPT | Performed by: OBSTETRICS & GYNECOLOGY

## 2021-10-11 PROCEDURE — 3078F DIAST BP <80 MM HG: CPT | Performed by: OBSTETRICS & GYNECOLOGY

## 2021-10-11 PROCEDURE — 3074F SYST BP LT 130 MM HG: CPT | Performed by: OBSTETRICS & GYNECOLOGY

## 2021-10-11 NOTE — PROGRESS NOTES
HPI:   Tolu Pinto is a 50year old  who presents for an annual gynecological exam.   Menses: Patient's last menstrual period was 10/23/2020 (approximate). Cycle length: 0 days Flow: none   has been on Gerda now for 3 years. Will stop. BY MOUTH DAILY 120 capsule 5   • ibuprofen 200 MG Oral Tab Take 400 mg by mouth every 6 (six) hours as needed. • furosemide 40 MG Oral Tab Take 40 mg by mouth daily. Takes 60 mg total per day.  A 40 mg and a 20 mg.   3   • Propranolol HCl  MG Or Pain in joints    • Pain with bowel movements    • Prolonged QT interval syndrome    • Seizure disorder ([facility]) 11/2017    last Sezure 2017   • Shortness of breath    • Sleep apnea    • Sleep disturbance    • Stress    • Visual impairment     glasses   • Arlene Daniels Quit date: 3/28/2008        Years since quittin.5      Smokeless tobacco: Former User    Vaping Use      Vaping Use: Never used    Alcohol use:  Yes      Alcohol/week: 0.0 - 1.0 standard drinks      Comment: approx 2-3 drinks every 2-3 weeks    Drug time, place and person; mood and affect appropriate    DISCUSSED:   - I encouraged incorporating 4x weekly cardiovascular activity to maintain good physical health. - We discussed maintaining a healthy diet heavy in fruits and vegetables.   - I encouraged

## 2021-10-15 ENCOUNTER — HOSPITAL ENCOUNTER (OUTPATIENT)
Dept: ULTRASOUND IMAGING | Age: 46
Discharge: HOME | End: 2021-10-15
Attending: INTERNAL MEDICINE
Payer: COMMERCIAL

## 2021-10-15 DIAGNOSIS — N95.0 POSTMENOPAUSAL BLEEDING: ICD-10-CM

## 2021-10-15 PROCEDURE — 76856 US EXAM PELVIC COMPLETE: CPT | Performed by: INTERNAL MEDICINE

## 2021-10-15 PROCEDURE — 76830 TRANSVAGINAL US NON-OB: CPT | Performed by: INTERNAL MEDICINE

## 2021-10-15 PROCEDURE — 93975 VASCULAR STUDY: CPT | Performed by: INTERNAL MEDICINE

## 2021-10-15 RX ORDER — ELAGOLIX 150 MG/1
TABLET, FILM COATED ORAL
Qty: 84 TABLET | Refills: 0 | OUTPATIENT
Start: 2021-10-15

## 2021-11-02 ENCOUNTER — NURSE ONLY (OUTPATIENT)
Dept: LAB | Facility: HOSPITAL | Age: 46
End: 2021-11-02
Attending: INTERNAL MEDICINE
Payer: COMMERCIAL

## 2021-11-02 ENCOUNTER — TELEPHONE (OUTPATIENT)
Dept: INTERNAL MEDICINE CLINIC | Facility: CLINIC | Age: 46
End: 2021-11-02

## 2021-11-02 ENCOUNTER — VIRTUAL PHONE E/M (OUTPATIENT)
Dept: INTERNAL MEDICINE CLINIC | Facility: CLINIC | Age: 46
End: 2021-11-02
Payer: COMMERCIAL

## 2021-11-02 DIAGNOSIS — Z20.822 SUSPECTED 2019 NOVEL CORONAVIRUS INFECTION: Primary | ICD-10-CM

## 2021-11-02 DIAGNOSIS — Z20.822 SUSPECTED 2019 NOVEL CORONAVIRUS INFECTION: ICD-10-CM

## 2021-11-02 PROCEDURE — 99442 PHONE E/M BY PHYS 11-20 MIN: CPT | Performed by: INTERNAL MEDICINE

## 2021-11-02 RX ORDER — BENZONATATE 200 MG/1
200 CAPSULE ORAL 3 TIMES DAILY PRN
Qty: 21 CAPSULE | Refills: 0 | Status: SHIPPED | OUTPATIENT
Start: 2021-11-02

## 2021-11-02 NOTE — TELEPHONE ENCOUNTER
Telephone Consent    Laura Cosby verbally  a Telephone Check-In service on 11-2-21  Patient understands and accepts financial responsibility for any deductible, co-insurance and/or co-pays associated with this service.       [phone]

## 2021-11-02 NOTE — PROGRESS NOTES
Virtual Telephone Check-In    Tolu Pinto verbally consents to a Virtual/Telephone Check-In visit on 11/02/21. Patient has been referred to the [facility] website at [email or web address] to review the yearly Consent to Treat document.     Patient un Tab Take 1 tablet (10 mg total) by mouth daily. 90 tablet 0   • Albuterol Sulfate  (90 Base) MCG/ACT Inhalation Aero Soln Inhale 2 puffs into the lungs every 6 (six) hours as needed for Wheezing.      • benzonatate 200 MG Oral Cap Take 1 capsule (200 this was an audio only visit. Unable to do video visit with pt.      DATA:  Results for orders placed or performed in visit on 10/11/21   HPV HIGH RISK , THIN PREP COLLECTION   Result Value Ref Range    HPV High Risk Negative Negative    HPV Source Cervical uri.   Will check for covid. Can use reglan for nausea. Benzonatate for cough if she has it. Monitor symptoms, still pretty early in the course of symptoms. If has worsening symptoms she should call the office.    If covid negative and fever free she

## 2021-11-29 DIAGNOSIS — R45.4 IRRITABILITY: ICD-10-CM

## 2021-12-01 RX ORDER — BUPROPION HYDROCHLORIDE 300 MG/1
TABLET ORAL
Qty: 90 TABLET | Refills: 1 | Status: SHIPPED | OUTPATIENT
Start: 2021-12-01

## 2021-12-01 NOTE — TELEPHONE ENCOUNTER
Last OV relevant to medication: 9/3/21  Last refill date: 8/30/21     #/refills: 90/0  When pt was asked to return for OV: 6 months  Upcoming appt/reason: 3/7/22, PE  Was pt informed of any over due labs: yes, [employer] sent

## 2021-12-16 NOTE — ED PROVIDER NOTES
Patient Seen in: [facility] Emergency Department    History   Patient presents with:  Dyspnea BONILLA SOB (respiratory)    Stated Complaint:     HPI    Patient is a 12-year-old female presenting to the emergency department with productive cough and shor wisdom teeth   • OTHER SURGICAL HISTORY      bunion   • OTHER SURGICAL HISTORY      knee left scope  not acl   • OTHER SURGICAL HISTORY  09/15/09    colpo    • PACEMAKER/DEFIBRILLATOR     • REMOVAL OF OVARIAN CYST(S)  2002   • TUBAL LIGATION  2002 Neurological: alert and oriented to person, place, and time. Skin: Skin is warm and dry. No rash noted. Psychiatric: normal mood and affect.      ED Course   Labs Reviewed - No data to display           MDM     She was evaluated thoroughly in the aarti [Appropriately responsive] : appropriately responsive [Alert] : alert [No Acute Distress] : no acute distress [Oriented x3] : oriented x3 [Examination Of The Breasts] : a normal appearance [No Discharge] : no discharge [No Masses] : no breast masses were palpable [No Lesions] : no lesions  [Labia Majora] : normal [Labia Minora] : normal [Pink Rugae] : pink rugae [No Bleeding] : There was no active vaginal bleeding [Normal] : normal [Normal Position] : in a normal position [Uterine Adnexae] : normal

## 2021-12-27 ENCOUNTER — TELEPHONE (OUTPATIENT)
Dept: INTERNAL MEDICINE CLINIC | Facility: CLINIC | Age: 46
End: 2021-12-27

## 2021-12-27 NOTE — TELEPHONE ENCOUNTER
To Dr Mo Morin. Form needs response and date regarding concern with pt using both Advair and propranolol. To fax back.

## 2021-12-30 ENCOUNTER — MED REC SCAN ONLY (OUTPATIENT)
Dept: INTERNAL MEDICINE CLINIC | Facility: CLINIC | Age: 46
End: 2021-12-30

## 2021-12-30 DIAGNOSIS — J45.20 MILD INTERMITTENT ASTHMA WITHOUT COMPLICATION: ICD-10-CM

## 2021-12-30 RX ORDER — MECLIZINE HYDROCHLORIDE 25 MG/1
25 TABLET ORAL 3 TIMES DAILY PRN
Qty: 30 TABLET | Refills: 0 | Status: SHIPPED | OUTPATIENT
Start: 2021-12-30

## 2021-12-30 RX ORDER — MONTELUKAST SODIUM 10 MG/1
TABLET ORAL
Qty: 90 TABLET | Refills: 0 | Status: SHIPPED | OUTPATIENT
Start: 2021-12-30

## 2021-12-30 NOTE — TELEPHONE ENCOUNTER
Last OV relevant to medication: 9/3/21  Last refill date: 10/9/18     #/refills: 90/0  When pt was asked to return for OV: 6 months  Upcoming appt/reason: 3/7/22, PE  Was pt informed of any over due labs: yes, Marty edil was sent 12/1/21 & pt read message

## 2021-12-30 NOTE — TELEPHONE ENCOUNTER
Did the patient contact the pharmacy directly?:  No     Is patient out of meds or supply very low?:  Out     Medication Requested:  meclizine    Dose:      Is patient requesting a 30 or 90 day supply?:  30    Pharmacy name and phone # or location:  [facility]

## 2022-01-14 ENCOUNTER — TELEPHONE (OUTPATIENT)
Dept: SURGERY | Facility: CLINIC | Age: 47
End: 2022-01-14

## 2022-01-14 ENCOUNTER — HOSPITAL ENCOUNTER (OUTPATIENT)
Dept: MAMMOGRAPHY | Facility: HOSPITAL | Age: 47
Discharge: HOME | End: 2022-01-14
Attending: OBSTETRICS & GYNECOLOGY
Payer: COMMERCIAL

## 2022-01-14 ENCOUNTER — OFFICE VISIT (OUTPATIENT)
Dept: SURGERY | Facility: CLINIC | Age: 47
End: 2022-01-14

## 2022-01-14 DIAGNOSIS — R82.90 URINE FINDING: ICD-10-CM

## 2022-01-14 DIAGNOSIS — Z12.31 BREAST CANCER SCREENING BY MAMMOGRAM: ICD-10-CM

## 2022-01-14 DIAGNOSIS — N39.46 URINARY INCONTINENCE, MIXED: Primary | ICD-10-CM

## 2022-01-14 LAB
APPEARANCE: CLEAR
BILIRUBIN: NEGATIVE
GLUCOSE (URINE DIPSTICK): NEGATIVE MG/DL
KETONES (URINE DIPSTICK): NEGATIVE MG/DL
LEUKOCYTES: NEGATIVE
MULTISTIX LOT#: NORMAL NUMERIC
NITRITE, URINE: NEGATIVE
OCCULT BLOOD: NEGATIVE
PH, URINE: 6 (ref 4.5–8)
PROTEIN (URINE DIPSTICK): NEGATIVE MG/DL
SPECIFIC GRAVITY: 1.02 (ref 1–1.03)
URINE-COLOR: YELLOW
UROBILINOGEN,SEMI-QN: 0.2 MG/DL (ref 0–1.9)

## 2022-01-14 PROCEDURE — 77067 SCR MAMMO BI INCL CAD: CPT | Performed by: OBSTETRICS & GYNECOLOGY

## 2022-01-14 PROCEDURE — 99203 OFFICE O/P NEW LOW 30 MIN: CPT | Performed by: PHYSICIAN ASSISTANT

## 2022-01-14 PROCEDURE — 81003 URINALYSIS AUTO W/O SCOPE: CPT | Performed by: PHYSICIAN ASSISTANT

## 2022-01-14 PROCEDURE — 77063 BREAST TOMOSYNTHESIS BI: CPT | Performed by: OBSTETRICS & GYNECOLOGY

## 2022-01-14 NOTE — PATIENT INSTRUCTIONS
Dietary Irritants    What you can do to help relieve your symptoms:    The purpose of this handout is to provide information that can help you discover what you are ingesting or doing that may be contributing to your recurrent irritative voiding symptoms a is contaminated with vaginal cells and bacteria and you are severely symptomatic, then a catheterized specimen should be obtained directly from your bladder to determine precisely whether bacterial infection is the cause of your symptoms.      Dietary irrit

## 2022-01-14 NOTE — TELEPHONE ENCOUNTER
Patient was unable to find her insurance card today. She says she has an active [employer] insurance plan. Patient has been advised to call office back in the next day or so with insurance id and group number for billing. I had patient complete a financial agreement in the case that coverage is not active(scanned into documents).  relayed all information to patient and patient had full understanding.

## 2022-01-14 NOTE — PROGRESS NOTES
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   • Leaking of urine    • Leg swelling    • Menses painful    • Mouth sores    • Night sweats    • Osteoarthritis reji   • Hypertension Brother    • Stroke Maternal Grandfather          approx 65      Social History: Social History    Tobacco Use      Smoking status: Former Smoker        Packs/day: 0.80        Years: 2.00        Pack years: 1.6        Types: 08/06/2021    BUN 14 08/06/2021    GLU 84 08/06/2021    GFRAA 120 08/06/2021    AST 30 08/06/2021    ALT 51 08/06/2021    TP 7.8 08/06/2021    ALB 3.4 08/06/2021    CA 8.9 08/06/2021    MG 2.1 09/07/2018       Urinalysis Results (last three years):  Recent terms has been sent to the patient. This exam was evaluated with a computer-aided device.   This patient's information has been entered into a reminder system with a target due date for the next mammogram.     Dictated by (CST): Savannah Day MD on 1/14

## 2022-01-21 ENCOUNTER — TELEPHONE (OUTPATIENT)
Dept: INTERNAL MEDICINE CLINIC | Facility: CLINIC | Age: 47
End: 2022-01-21

## 2022-02-20 NOTE — TELEPHONE ENCOUNTER
Last OV relevant to medication: 3/17/2020 - ER Follow Up, 2/2/2021 - Telemed Visit for COVID  Last refill date: 11/17/2020     #/refills: 90/0  When pt was asked to return for OV: 2 wks - Follow Up  Upcoming appt/reason: 3/1/2021 - PE w/   Was p
RUE/nonweight-bearing

## 2022-02-22 RX ORDER — PANTOPRAZOLE SODIUM 40 MG/1
40 TABLET, DELAYED RELEASE ORAL
Qty: 90 TABLET | Refills: 1 | Status: SHIPPED | OUTPATIENT
Start: 2022-02-22

## 2022-02-22 NOTE — TELEPHONE ENCOUNTER
Last OV pertinent to medication 9/3/21  Last refill date: 9/13/21 #/refills: 90 w/ 1  When patient was asked to return for OV: 6 months  Upcomming appt/reason: 3/7/86789 Physical  Labs: None

## 2022-03-16 NOTE — TELEPHONE ENCOUNTER
----- Message from Deuce Epps sent at 2/6/2020  4:43 PM CST -----  Regarding: RE:Pancho  Contact: [phone]  [facility]! Sorry, I did not get back to you sooner. I use the [facility] on [address]. Thank you!     Alessia  ----- Message -
Message left through . The prescription was sent to [facility] pharmacy. If there is any problem, call us back.
Pt was approved for 90 days with 0 refills. Needs to follow up in 2-3 months.   Pt needs PE/Pap
Satisfactory

## 2022-03-23 RX ORDER — MONTELUKAST SODIUM 10 MG/1
TABLET ORAL
Qty: 30 TABLET | Refills: 0 | Status: SHIPPED | OUTPATIENT
Start: 2022-03-23

## 2022-03-23 NOTE — TELEPHONE ENCOUNTER
Last OV pertinent to medication 9/3/21  Last refill date: 12/30/21 #/refills: 90 w/ 0  When patient was asked to return for OV: 6 months  Upcomming appt/reason: none shceduled  Labs: NA  My chart message sent to patient to schedule an appointment.

## 2022-04-22 ENCOUNTER — TELEPHONE (OUTPATIENT)
Dept: INTERNAL MEDICINE CLINIC | Facility: CLINIC | Age: 47
End: 2022-04-22

## 2022-04-22 NOTE — TELEPHONE ENCOUNTER
Pain below knee and swelling   Started last night - Right leg swollen more than normal   Pt elevated legs and drank water, used compression wrap - painful to the touch   Did not hit it on anything or injure it - no bruising   Warm to the touch   No itchiness   No red streaks   Yes can bear weight on leg and stand on it   Have more water nicloe than normal   Doesn't have scale to figure out how much water weight shes gained   Pain is 2/10 not bad pain it aches when she doesn't touch it  When she touches it there is a stabbing pain - 8/10    Below the knee feels like there is a lump there - similar to what a blister would feel like   All internal - no outward side of trauma   otc - no   No history of diabetes  No numbness tingling or vision changes   Weakness of both legs  No facial droop   Advised edw er - ten minutes eta  Pt is going to call family member to come take them to er since her legs are affected   Advised to follow up with dr Shaheed Rodriguez for er visit

## 2022-05-04 NOTE — TELEPHONE ENCOUNTER
Last OV pertinent to medication 9/3/21  Last refill date: 03/23/2022 #/refills: 30-0  When patient was asked to return for OV: 6 months  Upcomming appt/reason: none scheduled, LMTCB to schedule   Labs:n/a  30-0 pended below for provider approval

## 2022-05-05 RX ORDER — MONTELUKAST SODIUM 10 MG/1
10 TABLET ORAL DAILY
Qty: 30 TABLET | Refills: 0 | Status: SHIPPED | OUTPATIENT
Start: 2022-05-05

## 2022-05-06 RX ORDER — ELAGOLIX 150 MG/1
TABLET, FILM COATED ORAL
Qty: 84 TABLET | Refills: 0 | Status: SHIPPED | OUTPATIENT
Start: 2022-05-06

## 2022-06-28 ENCOUNTER — APPOINTMENT (OUTPATIENT)
Dept: GENERAL RADIOLOGY | Facility: HOSPITAL | Age: 47
End: 2022-06-28
Attending: EMERGENCY MEDICINE
Payer: COMMERCIAL

## 2022-06-28 ENCOUNTER — HOSPITAL ENCOUNTER (EMERGENCY)
Facility: HOSPITAL | Age: 47
Discharge: HOME | End: 2022-06-29
Attending: EMERGENCY MEDICINE
Payer: COMMERCIAL

## 2022-06-28 ENCOUNTER — TELEPHONE (OUTPATIENT)
Dept: INTERNAL MEDICINE CLINIC | Facility: CLINIC | Age: 47
End: 2022-06-28

## 2022-06-28 DIAGNOSIS — R05.9 COUGH: ICD-10-CM

## 2022-06-28 DIAGNOSIS — B34.9 VIRAL SYNDROME: Primary | ICD-10-CM

## 2022-06-28 LAB
BILIRUB UR QL STRIP.AUTO: NEGATIVE
CLARITY UR REFRACT.AUTO: CLEAR
COLOR UR AUTO: YELLOW
GLUCOSE UR STRIP.AUTO-MCNC: NEGATIVE MG/DL
KETONES UR STRIP.AUTO-MCNC: NEGATIVE MG/DL
LEUKOCYTE ESTERASE UR QL STRIP.AUTO: NEGATIVE
NITRITE UR QL STRIP.AUTO: NEGATIVE
PH UR STRIP.AUTO: 6 [PH] (ref 5–8)
PROT UR STRIP.AUTO-MCNC: NEGATIVE MG/DL
RBC UR QL AUTO: NEGATIVE
SARS-COV-2 RNA RESP QL NAA+PROBE: NOT DETECTED
SP GR UR STRIP.AUTO: 1.01 (ref 1–1.03)
UROBILINOGEN UR STRIP.AUTO-MCNC: <2 MG/DL

## 2022-06-28 PROCEDURE — 81001 URINALYSIS AUTO W/SCOPE: CPT | Performed by: EMERGENCY MEDICINE

## 2022-06-28 PROCEDURE — 99284 EMERGENCY DEPT VISIT MOD MDM: CPT

## 2022-06-28 PROCEDURE — 99283 EMERGENCY DEPT VISIT LOW MDM: CPT

## 2022-06-28 PROCEDURE — 71045 X-RAY EXAM CHEST 1 VIEW: CPT | Performed by: EMERGENCY MEDICINE

## 2022-06-29 VITALS
DIASTOLIC BLOOD PRESSURE: 92 MMHG | SYSTOLIC BLOOD PRESSURE: 140 MMHG | TEMPERATURE: 97 F | OXYGEN SATURATION: 98 % | WEIGHT: 211.63 LBS | RESPIRATION RATE: 18 BRPM | HEART RATE: 79 BPM | BODY MASS INDEX: 39 KG/M2

## 2022-06-29 LAB
FLUAV + FLUBV RNA SPEC NAA+PROBE: NOT DETECTED
FLUAV + FLUBV RNA SPEC NAA+PROBE: NOT DETECTED
RSV RNA SPEC NAA+PROBE: NOT DETECTED
SARS-COV-2 RNA RESP QL NAA+PROBE: NOT DETECTED

## 2022-06-29 PROCEDURE — 87637 SARSCOV2&INF A&B&RSV AMP PRB: CPT | Performed by: EMERGENCY MEDICINE

## 2022-06-29 RX ORDER — ALBUTEROL SULFATE 90 UG/1
2 AEROSOL, METERED RESPIRATORY (INHALATION) EVERY 4 HOURS PRN
Qty: 1 EACH | Refills: 0 | Status: SHIPPED | OUTPATIENT
Start: 2022-06-29 | End: 2022-07-29

## 2022-06-29 RX ORDER — DEXAMETHASONE 4 MG/1
10 TABLET ORAL ONCE
Status: COMPLETED | OUTPATIENT
Start: 2022-06-29 | End: 2022-06-29

## 2022-06-29 RX ORDER — PREDNISONE 20 MG/1
40 TABLET ORAL DAILY
Qty: 8 TABLET | Refills: 0 | Status: SHIPPED | OUTPATIENT
Start: 2022-06-30 | End: 2022-07-04

## 2022-06-29 NOTE — ED INITIAL ASSESSMENT (HPI)
Pt is hearing impaired. Pt has cough/cold sx since last week. +Covid exposure,  to be seen for same.

## 2022-06-30 DIAGNOSIS — J45.20 MILD INTERMITTENT ASTHMA WITHOUT COMPLICATION: ICD-10-CM

## 2022-06-30 RX ORDER — MONTELUKAST SODIUM 10 MG/1
10 TABLET ORAL DAILY
Qty: 90 TABLET | Refills: 0 | Status: SHIPPED | OUTPATIENT
Start: 2022-06-30

## 2022-06-30 NOTE — TELEPHONE ENCOUNTER
Future Appointments   Date Time Provider Phill Licona   7/5/2022  9:40 AM Manny Montero, [facility] [place]

## 2022-06-30 NOTE — TELEPHONE ENCOUNTER
Did the patient contact the pharmacy directly?:  Yes    Is patient out of meds or supply very low?:  Out    Medication Requested:  montelukast    Dose:  10 mg    Is patient requesting a 30 or 90 day supply?:  90    Pharmacy name and phone # or location:  [facility]/PHARMACY [address] [place] [address], [address] [place] [address], [phone]    Is the patient due for an appointment?: No  Additional Notes:

## 2022-06-30 NOTE — TELEPHONE ENCOUNTER
Med does not pass protocol   Attempted to reach pt to schedule physical, line was disconnected   Was seen in ed 6/28/22 for viral syndrome (neg covid)   30/0 pended below  Please advise

## 2022-06-30 NOTE — TELEPHONE ENCOUNTER
Pt CB to ask for 90 days. Pt said that ins will not cover med for 30 days, Pt needs 90 sent.   Pt did schedule PE with Dr Minerav Thompson on 7/12/22, and has med check appt on 7/5 with Kim   Please send 90 days through  Thank you

## 2022-06-30 NOTE — TELEPHONE ENCOUNTER
Ok to send 90ds instead of 30ds since pt scheduled med check appt?   Insurance doesn't cover 30ds apparently   90ds pended below  Future Appointments   Date Time Provider Phill Licona   7/5/2022  9:40 AM Maria T Montero, APRN [facility] Jarrett Talavera

## 2022-07-05 ENCOUNTER — OFFICE VISIT (OUTPATIENT)
Dept: INTERNAL MEDICINE CLINIC | Facility: CLINIC | Age: 47
End: 2022-07-05
Payer: COMMERCIAL

## 2022-07-05 VITALS
HEIGHT: 62 IN | TEMPERATURE: 97 F | DIASTOLIC BLOOD PRESSURE: 72 MMHG | SYSTOLIC BLOOD PRESSURE: 102 MMHG | RESPIRATION RATE: 14 BRPM | OXYGEN SATURATION: 96 % | WEIGHT: 211 LBS | HEART RATE: 89 BPM | BODY MASS INDEX: 38.83 KG/M2

## 2022-07-05 DIAGNOSIS — J06.9 VIRAL URI: Primary | ICD-10-CM

## 2022-07-05 DIAGNOSIS — J01.00 ACUTE NON-RECURRENT MAXILLARY SINUSITIS: ICD-10-CM

## 2022-07-05 DIAGNOSIS — R05.8 PRODUCTIVE COUGH: ICD-10-CM

## 2022-07-05 PROCEDURE — 3008F BODY MASS INDEX DOCD: CPT | Performed by: NURSE PRACTITIONER

## 2022-07-05 PROCEDURE — 3074F SYST BP LT 130 MM HG: CPT | Performed by: NURSE PRACTITIONER

## 2022-07-05 PROCEDURE — 99214 OFFICE O/P EST MOD 30 MIN: CPT | Performed by: NURSE PRACTITIONER

## 2022-07-05 PROCEDURE — 3078F DIAST BP <80 MM HG: CPT | Performed by: NURSE PRACTITIONER

## 2022-07-05 RX ORDER — BENZONATATE 200 MG/1
200 CAPSULE ORAL 3 TIMES DAILY PRN
Qty: 21 CAPSULE | Refills: 0 | Status: SHIPPED | OUTPATIENT
Start: 2022-07-05

## 2022-07-05 RX ORDER — DIPHENHYDRAMINE HYDROCHLORIDE 25 MG/1
1 TABLET ORAL AS DIRECTED
COMMUNITY

## 2022-07-05 RX ORDER — AMOXICILLIN AND CLAVULANATE POTASSIUM 875; 125 MG/1; MG/1
1 TABLET, FILM COATED ORAL 2 TIMES DAILY
Qty: 20 TABLET | Refills: 0 | Status: SHIPPED | OUTPATIENT
Start: 2022-07-05 | End: 2022-07-15

## 2022-07-05 RX ORDER — ASCORBIC ACID/MULTIVIT-MIN 1000 MG
1 EFFERVESCENT POWDER IN PACKET ORAL AS DIRECTED
COMMUNITY

## 2022-07-05 NOTE — PATIENT INSTRUCTIONS
Pulse oximeter- check it as needed if you are short of breath. It should be above 92%. If it goes below 90% consistently then get to the emergency room as needed. Use either Mucinex DM or Robutssin DM as needed for your cough    Use Cepacol lozenges    Use the nebulizer treatments as needed.     Use benzonatate Perles as needed    Take antibiotic completely as ordered     Take antibiotic with food    Eat yogurt twice a day while on antibiotic or take an oral probiotic    Monitor for diarrhea, side effects, allergy    Follow up in one week as needed or when routine care is due

## 2022-07-12 ENCOUNTER — TELEPHONE (OUTPATIENT)
Dept: INTERNAL MEDICINE CLINIC | Facility: CLINIC | Age: 47
End: 2022-07-12

## 2022-07-12 NOTE — TELEPHONE ENCOUNTER
Pt was supposed to have a PE scheduled for today   She is sick   Productive cough, clear   Coughing, cant catch my breath, inhaler helps, short of breath at rest   surgery is for pacemaker replacement next week - advised pt to   Chest is tight   No pulse ox at home   No n/v/d  Tylenol otc helps with headache   Coldease, mucinex dm  No dizziness   psr said she has surgery scheduled next week   Pt is short of breath at rest   Advised pt to go back to er - pt is going to go to edw er and follow-up with us after   Advised pt to call surgeon today and reschedule pacemaker placement - pt stated this is her 3rd time rescheduling - advised pt she needs to be healthy before having any kind of surgery   Also explained pt may need clearance from us prior to surgery and to ask cardiologist about that   Pt verbalized understanding and had no additional questions

## 2022-07-14 DIAGNOSIS — R05.8 PRODUCTIVE COUGH: ICD-10-CM

## 2022-07-14 RX ORDER — BENZONATATE 200 MG/1
200 CAPSULE ORAL 3 TIMES DAILY PRN
Qty: 21 CAPSULE | Refills: 0 | Status: SHIPPED | OUTPATIENT
Start: 2022-07-14

## 2022-07-14 NOTE — TELEPHONE ENCOUNTER
Yes pt is still coughing   Coughing is not better   No congestion   Pt is scheduled for surgery Tuesday and needs to stop coughing   She states she has no signs of infection   Denies fever, shortness of breath, chest pain, congestin, strictly has a productive cough with clear mucus and is asking if there's anything else that can be prescribed for her   She is aware this rx was sent   She said it is ok to leave a message if she doesn't answer

## 2022-07-15 ENCOUNTER — OFFICE VISIT (OUTPATIENT)
Dept: INTERNAL MEDICINE CLINIC | Facility: CLINIC | Age: 47
End: 2022-07-15
Payer: COMMERCIAL

## 2022-07-15 ENCOUNTER — HOSPITAL ENCOUNTER (OUTPATIENT)
Dept: GENERAL RADIOLOGY | Age: 47
Discharge: HOME | End: 2022-07-15
Attending: NURSE PRACTITIONER
Payer: COMMERCIAL

## 2022-07-15 VITALS
DIASTOLIC BLOOD PRESSURE: 60 MMHG | SYSTOLIC BLOOD PRESSURE: 122 MMHG | WEIGHT: 211 LBS | RESPIRATION RATE: 16 BRPM | TEMPERATURE: 99 F | HEART RATE: 67 BPM | BODY MASS INDEX: 38.83 KG/M2 | OXYGEN SATURATION: 98 % | HEIGHT: 62 IN

## 2022-07-15 DIAGNOSIS — R05.8 PRODUCTIVE COUGH: Primary | ICD-10-CM

## 2022-07-15 DIAGNOSIS — J40 BRONCHITIS: ICD-10-CM

## 2022-07-15 PROCEDURE — 3008F BODY MASS INDEX DOCD: CPT | Performed by: NURSE PRACTITIONER

## 2022-07-15 PROCEDURE — 71046 X-RAY EXAM CHEST 2 VIEWS: CPT | Performed by: NURSE PRACTITIONER

## 2022-07-15 PROCEDURE — 99214 OFFICE O/P EST MOD 30 MIN: CPT | Performed by: NURSE PRACTITIONER

## 2022-07-15 PROCEDURE — 3078F DIAST BP <80 MM HG: CPT | Performed by: NURSE PRACTITIONER

## 2022-07-15 PROCEDURE — 3074F SYST BP LT 130 MM HG: CPT | Performed by: NURSE PRACTITIONER

## 2022-07-15 RX ORDER — PREDNISONE 20 MG/1
40 TABLET ORAL DAILY
Qty: 10 TABLET | Refills: 0 | Status: SHIPPED | OUTPATIENT
Start: 2022-07-15 | End: 2022-07-20

## 2022-07-15 RX ORDER — AZITHROMYCIN 250 MG/1
TABLET, FILM COATED ORAL
Qty: 6 TABLET | Refills: 0 | Status: SHIPPED | OUTPATIENT
Start: 2022-07-15 | End: 2022-07-20

## 2022-07-15 NOTE — PATIENT INSTRUCTIONS
Get the chest x-ray done. Start prednisone 20 mg, 2 tablets daily x 5 days. Take antibiotic completely as ordered. Take antibiotic with food. Eat yogurt twice a day while on antibiotic or take an oral probiotic. Monitor for diarrhea, side effects, allergy. Continue the albuterol inhaler as needed. Rinse mouth after each use. Continue the benzonatate up to three times daily as needed for the cough. Go to ER for difficulty breathing or chest pain. Return to clinic in 2-3 weeks for your annual physical with Dr. Kristofer Billy or sooner as needed.

## 2022-07-19 DIAGNOSIS — R45.4 IRRITABILITY: ICD-10-CM

## 2022-07-19 RX ORDER — BUPROPION HYDROCHLORIDE 300 MG/1
TABLET ORAL
Qty: 90 TABLET | Refills: 0 | Status: SHIPPED | OUTPATIENT
Start: 2022-07-19

## 2022-07-19 NOTE — TELEPHONE ENCOUNTER
Last OV relevant to medication: 09/03/2021   Last refill date: 12/01/2021     #/refills: 90-1  When pt was asked to return for OV:Return in about 6 months (around 3/3/2022) for physical  Upcoming appt/reason: n/a, yuilop SLt message sent to pt  Was pt informed of any over due labs: n/a

## 2022-07-22 DIAGNOSIS — J45.20 MILD INTERMITTENT ASTHMA WITHOUT COMPLICATION: ICD-10-CM

## 2022-07-25 RX ORDER — MONTELUKAST SODIUM 10 MG/1
TABLET ORAL
Qty: 90 TABLET | Refills: 0 | OUTPATIENT
Start: 2022-07-25

## 2022-07-25 RX ORDER — MECLIZINE HYDROCHLORIDE 25 MG/1
TABLET ORAL
Qty: 30 TABLET | Refills: 0 | Status: SHIPPED | OUTPATIENT
Start: 2022-07-25

## 2022-07-25 NOTE — TELEPHONE ENCOUNTER
Last OV relevant to medication: Refill Encounter 12/30/21  Last refill date: 12/30/21     #/refills: 30/0  When pt was asked to return for OV: N/A  Upcoming appt/reason: None  Was pt informed of any over due labs: None

## 2022-08-05 DIAGNOSIS — R05.8 PRODUCTIVE COUGH: ICD-10-CM

## 2022-08-05 NOTE — TELEPHONE ENCOUNTER
Did the patient contact the pharmacy directly?: no    Is patient out of meds or supply very low?:  out    Medication Requested:  benzonatate 200 MG Oral Cap     Dose:     Is patient requesting a 30 or 90 day supply?:      Pharmacy name and phone # or location:  [facility] on [address]    Is the patient due for an appointment?: no  (if so, please schedule appt)    Additional Notes: pt states her cough is no better

## 2022-08-10 NOTE — TELEPHONE ENCOUNTER
Last OV relevant to medication: 07/15/2022  Last refill date:07/14/2022    #/refills: 21-0  When pt was asked to return for OV: Return in about 2 weeks (around 7/29/2022) for physical.  Upcoming appt/reason: n/a  Was pt informed of any over due labs: n/a  Mychart sent for clarification, hold

## 2022-08-14 ENCOUNTER — HOSPITAL ENCOUNTER (EMERGENCY)
Facility: HOSPITAL | Age: 47
Discharge: HOME | End: 2022-08-14
Attending: EMERGENCY MEDICINE
Payer: COMMERCIAL

## 2022-08-14 ENCOUNTER — APPOINTMENT (OUTPATIENT)
Dept: GENERAL RADIOLOGY | Facility: HOSPITAL | Age: 47
End: 2022-08-14
Attending: EMERGENCY MEDICINE
Payer: COMMERCIAL

## 2022-08-14 VITALS
SYSTOLIC BLOOD PRESSURE: 123 MMHG | TEMPERATURE: 97 F | OXYGEN SATURATION: 99 % | RESPIRATION RATE: 16 BRPM | DIASTOLIC BLOOD PRESSURE: 83 MMHG | WEIGHT: 227.5 LBS | BODY MASS INDEX: 41.86 KG/M2 | HEIGHT: 62 IN | HEART RATE: 79 BPM

## 2022-08-14 DIAGNOSIS — I89.0 LYMPHEDEMA: Primary | ICD-10-CM

## 2022-08-14 LAB
ALBUMIN SERPL-MCNC: 3.2 G/DL (ref 3.4–5)
ALBUMIN/GLOB SERPL: 0.8 {RATIO} (ref 1–2)
ALP LIVER SERPL-CCNC: 115 U/L
ALT SERPL-CCNC: 31 U/L
ANION GAP SERPL CALC-SCNC: 4 MMOL/L (ref 0–18)
AST SERPL-CCNC: 18 U/L (ref 15–37)
BASOPHILS # BLD AUTO: 0.05 X10(3) UL (ref 0–0.2)
BASOPHILS NFR BLD AUTO: 0.7 %
BILIRUB SERPL-MCNC: 0.6 MG/DL (ref 0.1–2)
BUN BLD-MCNC: 9 MG/DL (ref 7–18)
CALCIUM BLD-MCNC: 8.7 MG/DL (ref 8.5–10.1)
CHLORIDE SERPL-SCNC: 106 MMOL/L (ref 98–112)
CO2 SERPL-SCNC: 28 MMOL/L (ref 21–32)
CREAT BLD-MCNC: 0.65 MG/DL
EOSINOPHIL # BLD AUTO: 0.41 X10(3) UL (ref 0–0.7)
EOSINOPHIL NFR BLD AUTO: 5.5 %
ERYTHROCYTE [DISTWIDTH] IN BLOOD BY AUTOMATED COUNT: 12.6 %
GFR SERPLBLD BASED ON 1.73 SQ M-ARVRAT: 109 ML/MIN/1.73M2 (ref 60–?)
GLOBULIN PLAS-MCNC: 3.8 G/DL (ref 2.8–4.4)
GLUCOSE BLD-MCNC: 100 MG/DL (ref 70–99)
HCT VFR BLD AUTO: 41.8 %
HGB BLD-MCNC: 13.5 G/DL
IMM GRANULOCYTES # BLD AUTO: 0.01 X10(3) UL (ref 0–1)
IMM GRANULOCYTES NFR BLD: 0.1 %
LYMPHOCYTES # BLD AUTO: 2 X10(3) UL (ref 1–4)
LYMPHOCYTES NFR BLD AUTO: 26.8 %
MCH RBC QN AUTO: 32.3 PG (ref 26–34)
MCHC RBC AUTO-ENTMCNC: 32.3 G/DL (ref 31–37)
MCV RBC AUTO: 100 FL
MONOCYTES # BLD AUTO: 0.91 X10(3) UL (ref 0.1–1)
MONOCYTES NFR BLD AUTO: 12.2 %
NEUTROPHILS # BLD AUTO: 4.08 X10 (3) UL (ref 1.5–7.7)
NEUTROPHILS # BLD AUTO: 4.08 X10(3) UL (ref 1.5–7.7)
NEUTROPHILS NFR BLD AUTO: 54.7 %
NT-PROBNP SERPL-MCNC: 388 PG/ML (ref ?–125)
OSMOLALITY SERPL CALC.SUM OF ELEC: 285 MOSM/KG (ref 275–295)
PLATELET # BLD AUTO: 200 10(3)UL (ref 150–450)
POTASSIUM SERPL-SCNC: 3.4 MMOL/L (ref 3.5–5.1)
PROT SERPL-MCNC: 7 G/DL (ref 6.4–8.2)
RBC # BLD AUTO: 4.18 X10(6)UL
SODIUM SERPL-SCNC: 138 MMOL/L (ref 136–145)
WBC # BLD AUTO: 7.5 X10(3) UL (ref 4–11)

## 2022-08-14 PROCEDURE — 85025 COMPLETE CBC W/AUTO DIFF WBC: CPT | Performed by: EMERGENCY MEDICINE

## 2022-08-14 PROCEDURE — 71045 X-RAY EXAM CHEST 1 VIEW: CPT | Performed by: EMERGENCY MEDICINE

## 2022-08-14 PROCEDURE — 80053 COMPREHEN METABOLIC PANEL: CPT | Performed by: EMERGENCY MEDICINE

## 2022-08-14 PROCEDURE — 99284 EMERGENCY DEPT VISIT MOD MDM: CPT

## 2022-08-14 PROCEDURE — 93005 ELECTROCARDIOGRAM TRACING: CPT

## 2022-08-14 PROCEDURE — 93010 ELECTROCARDIOGRAM REPORT: CPT

## 2022-08-14 PROCEDURE — 83880 ASSAY OF NATRIURETIC PEPTIDE: CPT | Performed by: EMERGENCY MEDICINE

## 2022-08-14 RX ORDER — FUROSEMIDE 10 MG/ML
40 INJECTION INTRAMUSCULAR; INTRAVENOUS ONCE
Status: DISCONTINUED | OUTPATIENT
Start: 2022-08-14 | End: 2022-08-14

## 2022-08-14 RX ORDER — FUROSEMIDE 20 MG/1
20 TABLET ORAL 2 TIMES DAILY
Qty: 10 TABLET | Refills: 0 | Status: SHIPPED | OUTPATIENT
Start: 2022-08-14 | End: 2022-08-19

## 2022-08-14 RX ORDER — FUROSEMIDE 40 MG/1
40 TABLET ORAL DAILY
Status: DISCONTINUED | OUTPATIENT
Start: 2022-08-14 | End: 2022-08-14

## 2022-08-14 NOTE — ED QUICK NOTES
Requested an US iv RN to pt's room at [phone]. Pt remains resting comfortably - steady gait multiple times to bathroom. US RN now at bs. Pt tolerated well.

## 2022-08-14 NOTE — ED INITIAL ASSESSMENT (HPI)
Pt ambulatory to er with complaint of increase in swelling of BLE x 3 days  Uses water pill - not helping \"out of control\"  Denies injuries  Pain last night 9/10   ble pain now 4/10  Swelling+  PT ALSO REPORT CONGESTED COUGH X TWO MONTHS  RECENT HX BRONCHITIS AND SINUS INFECTION

## 2022-08-15 LAB
ATRIAL RATE: 80 BPM
P AXIS: 103 DEGREES
P-R INTERVAL: 240 MS
Q-T INTERVAL: 482 MS
QRS DURATION: 68 MS
QTC CALCULATION (BEZET): 555 MS
R AXIS: 19 DEGREES
T AXIS: 49 DEGREES
VENTRICULAR RATE: 80 BPM

## 2022-08-16 RX ORDER — BENZONATATE 200 MG/1
200 CAPSULE ORAL 3 TIMES DAILY PRN
Qty: 21 CAPSULE | Refills: 0 | OUTPATIENT
Start: 2022-08-16

## 2022-08-18 ENCOUNTER — OFFICE VISIT (OUTPATIENT)
Dept: INTERNAL MEDICINE CLINIC | Facility: CLINIC | Age: 47
End: 2022-08-18
Payer: COMMERCIAL

## 2022-08-18 VITALS
WEIGHT: 222.38 LBS | TEMPERATURE: 98 F | BODY MASS INDEX: 40.92 KG/M2 | OXYGEN SATURATION: 97 % | SYSTOLIC BLOOD PRESSURE: 114 MMHG | HEART RATE: 80 BPM | RESPIRATION RATE: 14 BRPM | DIASTOLIC BLOOD PRESSURE: 80 MMHG | HEIGHT: 62 IN

## 2022-08-18 DIAGNOSIS — I89.0 LYMPHEDEMA: Primary | ICD-10-CM

## 2022-08-18 DIAGNOSIS — R05.8 PRODUCTIVE COUGH: ICD-10-CM

## 2022-08-18 DIAGNOSIS — E87.6 HYPOKALEMIA: ICD-10-CM

## 2022-08-18 PROCEDURE — 99214 OFFICE O/P EST MOD 30 MIN: CPT | Performed by: NURSE PRACTITIONER

## 2022-08-18 PROCEDURE — 3074F SYST BP LT 130 MM HG: CPT | Performed by: NURSE PRACTITIONER

## 2022-08-18 PROCEDURE — 3079F DIAST BP 80-89 MM HG: CPT | Performed by: NURSE PRACTITIONER

## 2022-08-18 PROCEDURE — 3008F BODY MASS INDEX DOCD: CPT | Performed by: NURSE PRACTITIONER

## 2022-08-18 RX ORDER — BENZONATATE 200 MG/1
200 CAPSULE ORAL 3 TIMES DAILY PRN
Qty: 21 CAPSULE | Refills: 0 | Status: SHIPPED | OUTPATIENT
Start: 2022-08-18

## 2022-08-18 RX ORDER — PANTOPRAZOLE SODIUM 40 MG/1
40 TABLET, DELAYED RELEASE ORAL
Qty: 90 TABLET | Refills: 1 | Status: SHIPPED | OUTPATIENT
Start: 2022-08-18

## 2022-08-19 NOTE — PATIENT INSTRUCTIONS
Start lymphedema clinic    Elevate legs. Follow up as needed or when routine care is due with Dr. Msefin Paige.

## 2022-10-24 DIAGNOSIS — R45.4 IRRITABILITY: ICD-10-CM

## 2022-10-24 RX ORDER — BUPROPION HYDROCHLORIDE 300 MG/1
TABLET ORAL
Qty: 30 TABLET | Refills: 0 | Status: SHIPPED | OUTPATIENT
Start: 2022-10-24

## 2022-11-14 ENCOUNTER — IMMUNIZATION (OUTPATIENT)
Dept: LAB | Age: 47
End: 2022-11-14
Attending: EMERGENCY MEDICINE
Payer: COMMERCIAL

## 2022-11-14 DIAGNOSIS — Z23 NEED FOR VACCINATION: Primary | ICD-10-CM

## 2022-11-14 PROCEDURE — 0124A SARSCOV2 VAC BVL 30MCG/0.3ML: CPT

## 2022-11-14 PROCEDURE — 90686 IIV4 VACC NO PRSV 0.5 ML IM: CPT

## 2022-11-14 PROCEDURE — 90471 IMMUNIZATION ADMIN: CPT

## 2023-01-04 DIAGNOSIS — J45.20 MILD INTERMITTENT ASTHMA WITHOUT COMPLICATION: ICD-10-CM

## 2023-01-04 RX ORDER — MONTELUKAST SODIUM 10 MG/1
10 TABLET ORAL DAILY
Qty: 90 TABLET | Refills: 0 | OUTPATIENT
Start: 2023-01-04

## 2023-01-04 RX ORDER — PANTOPRAZOLE SODIUM 40 MG/1
40 TABLET, DELAYED RELEASE ORAL
Qty: 90 TABLET | Refills: 1 | OUTPATIENT
Start: 2023-01-04

## 2023-01-04 NOTE — TELEPHONE ENCOUNTER
Last OV relevant to medication: 8/18/22  Last refill date: 2/28/2019 #not listed/refills: 3  When pt was asked to return for OV: physical due 10/11/22-[employer] message sent   Upcoming appt/reason: No future appointments. Was pt informed of any over due labs: none  Lab Results   Component Value Date     (H) 08/14/2022    BUN 9 08/14/2022    BUNCREA 18.1 03/05/2020    CREATSERUM 0.65 08/14/2022    ANIONGAP 4 08/14/2022     04/07/2017    GFRNAA 104 08/06/2021    GFRAA 120 08/06/2021    CA 8.7 08/14/2022    OSMOCALC 285 08/14/2022    ALKPHO 115 (H) 08/14/2022    AST 18 08/14/2022    ALT 31 08/14/2022    BILT 0.6 08/14/2022    TP 7.0 08/14/2022    ALB 3.2 (L) 08/14/2022    GLOBULIN 3.8 08/14/2022    AGRATIO 1.4 07/18/2012     08/14/2022    K 3.4 (L) 08/14/2022     08/14/2022    CO2 28.0 08/14/2022     This was last filled in 2019 but was listed on 8/18 OV & short term 20mg dose ordered acutely 8/14? Hobbyhart message sent to patient asking for clarification.

## 2023-01-04 NOTE — TELEPHONE ENCOUNTER
We are not prescribing this. Looks like it was prescribed after a hospital or ER stay last time and given for 5 days. Please read the notes to find out if it was restarted by anyone. I cant find who it was even prescribed from before that. Maybe cards? Thanks.

## 2023-01-05 RX ORDER — FUROSEMIDE 40 MG/1
40 TABLET ORAL DAILY
Qty: 30 TABLET | Refills: 0 | OUTPATIENT
Start: 2023-01-05

## 2023-01-06 NOTE — TELEPHONE ENCOUNTER
"Message left through  to have patient call back regarding scheduling surgery. None  Ambulate in room:  1 = Total, A lot = 2, A little = 3; 4 = None  3-5 Steps with railin = Total, A lot = 2, A little = 3; 4 = None  Score: 10    Modified [place]: N/A = No pre-op stroke/TIA    Post-Tx Position: Up in Chair, Alarms activated and Call light and personal items within reach  PPE: gloves and surgical mask

## 2023-01-27 ENCOUNTER — TELEPHONE (OUTPATIENT)
Dept: INTERNAL MEDICINE CLINIC | Facility: CLINIC | Age: 48
End: 2023-01-27

## 2023-01-27 DIAGNOSIS — Z00.00 ANNUAL PHYSICAL EXAM: Primary | ICD-10-CM

## 2023-01-27 NOTE — TELEPHONE ENCOUNTER
Spoke with Alessia, scheduled annual physical for 2/14/23 with , pt would like to know if she needs any labs prior to her appointment. Please advise. Pt would prefer a mychart message with labs ordered and fasting protocol.

## 2023-01-31 ENCOUNTER — PATIENT OUTREACH (OUTPATIENT)
Dept: INTERNAL MEDICINE CLINIC | Facility: CLINIC | Age: 48
End: 2023-01-31

## 2023-01-31 NOTE — PROGRESS NOTES
No answer on home phoine, voicemail left via  service to return call re location/records for ER visit. Pt told office staff she would bring available Er records with her to appointment.

## 2023-01-31 NOTE — PROGRESS NOTES
Patient has an appt today for ER f/u at 11 am. I see no ER records in care everywhere. Please obtain records. Thanks.

## 2023-05-12 ENCOUNTER — TELEPHONE (OUTPATIENT)
Dept: INTERNAL MEDICINE CLINIC | Facility: CLINIC | Age: 48
End: 2023-05-12

## 2023-05-12 RX ORDER — METOCLOPRAMIDE 5 MG/1
5 TABLET ORAL
Qty: 15 TABLET | Refills: 0 | Status: SHIPPED | OUTPATIENT
Start: 2023-05-12

## 2023-05-12 RX ORDER — METOCLOPRAMIDE 5 MG/1
5 TABLET ORAL
Qty: 15 TABLET | Refills: 0 | Status: SHIPPED | OUTPATIENT
Start: 2023-05-12 | End: 2023-05-12

## 2023-05-12 NOTE — TELEPHONE ENCOUNTER
Noted. Last refill was done by er when she had an acute episode of nausea and vomiting. Has not needed it in 3 years and now has it again. Reviewed note below. Please also make sure no abdominal pain. I've done the refill to provide her with some relief from the nausea but if this is not resolved in 24 hours or if it is worsening before then she should go to the ER for evaluation. Not a good idea to continue to take antinausea medication without evaluation.

## 2023-05-12 NOTE — TELEPHONE ENCOUNTER
[facility] [place] [address] [facility] [address], [phone], [phone]. She does not have any medication left. Thank you!

## 2023-05-12 NOTE — TELEPHONE ENCOUNTER
mychart message sent with provider's advice, if this is not resolved in 24 hours or if it is worsening before then she should go to the ER for evaluation

## 2023-05-12 NOTE — TELEPHONE ENCOUNTER
Called back via . Pt called in to request Metoclopramide refill which has not been filled by our office since 2020- see note below. Last OV 8/18/22, PE scheduled 6/22/23 with Dr Wes Hall. Pt reports that she has vomited indigested food x 4 since getting up this morning. Afebrile. Feels nauseous and gassy but no diarrhea or constipation. Denies headache, dizziness, abdominal pain. Denies change in diet or sick contacts. Reviewed home care for vomiting- small sips of clear fluids, small bites of plain dry food such as crackers, rest.     Metoclopramide pended for your review.

## 2023-05-12 NOTE — TELEPHONE ENCOUNTER
Is this medication prescribed by the [facility] Providers? ?    Did the patient contact the pharmacy directly?:  yes    Is patient out of meds or supply very low?:  unknown    Medication Requested:  Metoclopramide HCl 5 MG Oral Tab      Dose:  5mg    Is patient requesting a 30 or 90 day supply?:  30    Pharmacy name and phone # or location:  [address] [phone]     Is the patient due for an appointment?: appt scheduled  (if so, please schedule appt)    Additional Notes:  Language line called to request rx for patient. . unknown if pt is low or out. Shivam Stewart Please advise the patient refills take up to 72 business hours.

## 2023-06-29 DIAGNOSIS — J45.20 MILD INTERMITTENT ASTHMA WITHOUT COMPLICATION: ICD-10-CM

## 2023-07-03 RX ORDER — MONTELUKAST SODIUM 10 MG/1
10 TABLET ORAL DAILY
Qty: 30 TABLET | Refills: 0 | Status: SHIPPED | OUTPATIENT
Start: 2023-07-03

## 2023-07-05 DIAGNOSIS — J45.20 MILD INTERMITTENT ASTHMA WITHOUT COMPLICATION: ICD-10-CM

## 2023-07-06 RX ORDER — POTASSIUM CHLORIDE 20 MEQ/1
20 TABLET, EXTENDED RELEASE ORAL DAILY
Qty: 30 TABLET | Refills: 0 | OUTPATIENT
Start: 2023-07-06

## 2023-07-06 RX ORDER — DIPHENHYDRAMINE HYDROCHLORIDE 25 MG/1
1 TABLET ORAL AS DIRECTED
Qty: 30 CAPSULE | Refills: 0 | OUTPATIENT
Start: 2023-07-06

## 2023-07-06 RX ORDER — MAGNESIUM OXIDE 400 MG/1
1 TABLET ORAL 2 TIMES DAILY
Qty: 60 TABLET | Refills: 0 | OUTPATIENT
Start: 2023-07-06

## 2023-07-06 NOTE — TELEPHONE ENCOUNTER
Rx denied and pt notified to schedule apt and reach out to prescribing providers for refill of K and mag.

## 2023-07-06 NOTE — TELEPHONE ENCOUNTER
Refill not appropriate. We do not prescribe biotin. We have not done K for her. And we have not done mag for her. She needs an apt with Dr. Geovanna Bassett. If the mag and K are coming from cards she needs to see them. Thanks.

## 2023-07-06 NOTE — TELEPHONE ENCOUNTER
Last OV relevant to medication: 8/18/22-hypokalemia addressed at this visit  Last refill date: all historical  When pt was asked to return for OV: Pt overdue for PE  Upcoming appt/reason: No future appointments.   Was pt informed of any over due labs: message sent  Lab Results   Component Value Date     (H) 08/14/2022    BUN 9 08/14/2022    BUNCREA 18.1 03/05/2020    CREATSERUM 0.65 08/14/2022    ANIONGAP 4 08/14/2022     04/07/2017    GFRNAA 104 08/06/2021    GFRAA 120 08/06/2021    CA 8.7 08/14/2022    OSMOCALC 285 08/14/2022    ALKPHO 115 (H) 08/14/2022    AST 18 08/14/2022    ALT 31 08/14/2022    BILT 0.6 08/14/2022    TP 7.0 08/14/2022    ALB 3.2 (L) 08/14/2022    GLOBULIN 3.8 08/14/2022    AGRATIO 1.4 07/18/2012     08/14/2022    K 3.4 (L) 08/14/2022     08/14/2022    CO2 28.0 08/14/2022       Lab Results   Component Value Date    MG 2.1 09/07/2018       Pt has been contacted twice for apt, please call again and send letter to schedule PE, thanks!!

## 2023-07-10 RX ORDER — MONTELUKAST SODIUM 10 MG/1
TABLET ORAL
Qty: 30 TABLET | Refills: 0 | OUTPATIENT
Start: 2023-07-10

## 2023-07-18 DIAGNOSIS — J45.20 MILD INTERMITTENT ASTHMA WITHOUT COMPLICATION: ICD-10-CM

## 2023-07-20 RX ORDER — MONTELUKAST SODIUM 10 MG/1
10 TABLET ORAL DAILY
Qty: 30 TABLET | Refills: 0 | OUTPATIENT
Start: 2023-07-20

## 2023-07-20 NOTE — TELEPHONE ENCOUNTER
Last OV relevant to medication: 8/18/22  Last refill date: 7/3/23 30     #/refills: 0  When pt was asked to return for OV: 10/11/22  Upcoming appt/reason: No future appointments.     Was pt informed of any over due labs: due   Lab Results   Component Value Date     (H) 08/14/2022    BUN 9 08/14/2022    BUNCREA 18.1 03/05/2020    CREATSERUM 0.65 08/14/2022    ANIONGAP 4 08/14/2022     04/07/2017    GFRNAA 104 08/06/2021    GFRAA 120 08/06/2021    CA 8.7 08/14/2022    OSMOCALC 285 08/14/2022    ALKPHO 115 (H) 08/14/2022    AST 18 08/14/2022    ALT 31 08/14/2022    BILT 0.6 08/14/2022    TP 7.0 08/14/2022    ALB 3.2 (L) 08/14/2022    GLOBULIN 3.8 08/14/2022    AGRATIO 1.4 07/18/2012     08/14/2022    K 3.4 (L) 08/14/2022     08/14/2022    CO2 28.0 08/14/2022     Front dest, please call the pt also to make appt, thank you

## 2023-09-07 DIAGNOSIS — J45.20 MILD INTERMITTENT ASTHMA WITHOUT COMPLICATION: ICD-10-CM

## 2023-09-07 RX ORDER — MONTELUKAST SODIUM 10 MG/1
10 TABLET ORAL DAILY
Qty: 30 TABLET | Refills: 0 | Status: SHIPPED | OUTPATIENT
Start: 2023-09-07

## 2023-09-07 NOTE — TELEPHONE ENCOUNTER
Last OV relevant to medication: 7/15/22   Last refill date: 7/3/23 30     #/refills: 0  When pt was asked to return for OV: 2 weeks   Upcoming appt/reason: No future appointments. Was pt informed of any over due labs: due . Pt notified through mychart       Letter by Nanci Deleon MD on 3/1/2021    ASTHMA ACTION PLAN for Dante King     : 1975     Date: 3/1/2021  Provider:  Marli Bravo MD  Phone for doctor or clinic: [facility],  [address] [phone]     ACT Score: 25         Front dest, please call the pt also to make appt and labs are due .   thank you

## 2023-09-07 NOTE — TELEPHONE ENCOUNTER
Is this medication prescribed by the [facility] Providers? Yes    Did the patient contact the pharmacy directly?:  No    Is patient out of meds or supply very low?:  Yes, 0    Medication Requested:  montelukast     Dose:  10 MG Oral Tab     Is patient requesting a 30 or 90 day supply?:  30    Pharmacy name and phone # or location:  [address], [address] [facility] [address], [phone], [phone]     Is the patient due for an appointment?: No  (if so, please schedule appt)    Additional Notes:      Please advise the patient refills take up to 72 business hours.

## 2023-09-14 ENCOUNTER — TELEPHONE (OUTPATIENT)
Dept: INTERNAL MEDICINE CLINIC | Facility: CLINIC | Age: 48
End: 2023-09-14

## 2023-09-27 ENCOUNTER — TELEPHONE (OUTPATIENT)
Dept: INTERNAL MEDICINE CLINIC | Facility: CLINIC | Age: 48
End: 2023-09-27

## 2023-09-27 NOTE — TELEPHONE ENCOUNTER
Talked to pt and pt said she tested negative for covid on Monday. Pt still has SOB upon exertion and better at rest. Pt has wheezing  and using albuterol more often . Pt denies chest pain, diarrhea, N/V and any other symptoms. Pt has mild cough and clear mucous. Advised the pt to be seen in [facility]. Pt will go to [place] [address] for evaluation. Advised if symptoms worsen to go to ER. Patient notified.  Patient verbalized understanding

## 2024-07-15 ENCOUNTER — OFFICE VISIT (OUTPATIENT)
Dept: INTERNAL MEDICINE CLINIC | Facility: CLINIC | Age: 49
End: 2024-07-15
Payer: COMMERCIAL

## 2024-07-15 VITALS
SYSTOLIC BLOOD PRESSURE: 122 MMHG | BODY MASS INDEX: 41.07 KG/M2 | RESPIRATION RATE: 16 BRPM | TEMPERATURE: 97 F | OXYGEN SATURATION: 98 % | HEIGHT: 62 IN | DIASTOLIC BLOOD PRESSURE: 72 MMHG | WEIGHT: 223.19 LBS | HEART RATE: 81 BPM

## 2024-07-15 DIAGNOSIS — J45.30 MILD PERSISTENT ASTHMA WITHOUT COMPLICATION (HCC): Chronic | ICD-10-CM

## 2024-07-15 DIAGNOSIS — G40.909 SEIZURE DISORDER (HCC): Chronic | ICD-10-CM

## 2024-07-15 DIAGNOSIS — N32.81 OVERACTIVE BLADDER: ICD-10-CM

## 2024-07-15 DIAGNOSIS — N39.46 MIXED STRESS AND URGE URINARY INCONTINENCE: ICD-10-CM

## 2024-07-15 DIAGNOSIS — N32.81 OVERACTIVE BLADDER: Primary | ICD-10-CM

## 2024-07-15 RX ORDER — MONTELUKAST SODIUM 10 MG/1
10 TABLET ORAL DAILY
Qty: 90 TABLET | Refills: 0 | Status: SHIPPED | OUTPATIENT
Start: 2024-07-15

## 2024-07-15 RX ORDER — OXYBUTYNIN CHLORIDE 5 MG/1
5 TABLET, EXTENDED RELEASE ORAL DAILY
Qty: 30 TABLET | Refills: 0 | Status: SHIPPED | OUTPATIENT
Start: 2024-07-15

## 2024-07-15 NOTE — PROGRESS NOTES
CHIEF COMPLAINT:     Chief Complaint   Patient presents with    Bladder Problem     Its been going on for a while , it has been some accidents with going to the bathroom        HPI:   Jessica Epps is a 48 year old female coming in for urinary frequency and incontinence. Last seen in office in 2022. She moved to [place] and has been busy with her 's health. Hasn't had time to schedule appointment till now. Live sign language interpretor used for this visit.     Symptoms ongoing for 1+ years but worse in the past few months. No dysuria, hematuria, abdominal pain, pelvic or flank pain. Has urgency and has leakage of urine if she can't make it to bathroom at times. Also has leakage of urine with coughing or sneezing. Trying kegel exercises which do help somewhat.     Asthma; Has been stable. Out of her Singulair for a while, needs a refill.    Seizure disorder: She is still on phenytoin, getting it from her cardiologist instead. Hasn't seen neuro in a while, needs a new referral.     Past Medical History:    Abdominal pain    Arrhythmia    Arthritis    Asthma (HCC)    Back pain    Belching    Bloating    Blood in the stool    Cardiac defibrillator in place    CERVICAL DYSPLASIA    lgsil    Constipation    Decorative tattoo    Diarrhea, unspecified    Easy bruising    Endometriosis    Has had for approx. 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    Leaking of urine    Leg swelling    Menses painful     Mouth sores    Night sweats    Osteoarthritis    Pacemaker    Pain in joints    Pain with bowel movements    Prolonged QT interval syndrome    Seizure disorder (HCC)    last  2017    Shortness of breath    Sleep apnea    Sleep disturbance    Stress    Visual impairment    glasses    Weight loss      Past Surgical History:   Procedure Laterality Date    Cardiac defibrillator placement      dual chamber-[employer] [id number]    Cardiac pacemaker placement  1992    Cardiac pacemaker placement      Colonoscopy N/A 2018    Procedure: COLONOSCOPY;  Surgeon: Elliott Eastman DO;  Location:  ENDOSCOPY    Cu (chronic urticaria) index      Egd      Oral surgery procedure      wisdom teeth    Other surgical history      bunion    Other surgical history      knee left scope  not acl    Other surgical history  09/15/09    colpo     Pacemaker/defibrillator      Removal of ovarian cyst(s)      Tubal ligation        Social History:  Social History     Socioeconomic History    Marital status:    Tobacco Use    Smoking status: Former     Current packs/day: 0.00     Average packs/day: 0.8 packs/day for 2.0 years (1.6 ttl pk-yrs)     Types: Cigarettes     Start date: 3/28/2006     Quit date: 3/28/2008     Years since quittin.3    Smokeless tobacco: Former   Vaping Use    Vaping status: Never Used   Substance and Sexual Activity    Alcohol use: Yes     Alcohol/week: 0.0 - 1.0 standard drinks of alcohol     Comment: approx 2-3 drinks every 2-3 weeks    Drug use: No    Sexual activity: Yes     Partners: Male     Birth control/protection: Tubal Ligation   Other Topics Concern    Caffeine Concern Yes     Comment: 1-2 cups of coffee daily    Exercise Yes     Comment: walks      Family History:  Family History   Problem Relation Age of Onset    Heart Disorder Father     Other (Other) Father         polio wiht  back issues resulting    Other (menieres) Father     Hypertension Mother          controlled on medicine    Asthma Mother     Allergies Mother         med's  and seansonal    Arthritis Mother     Heart Disease Mother     Stroke Mother         massive stroke 2003 cause of death  - seizure    Other (Other) Mother         hemochromatosis/ cva liver disorder    Heart Attack Paternal Grandfather          1974    Cancer Maternal Grandmother         Lung    Stroke Maternal Grandfather     Other (Other) Maternal Grandfather         Stroke    Asthma Brother     Other (Other) Paternal Grandmother         ephzema    Hypertension Brother     Stroke Maternal Grandfather          approx       Allergies:  Allergies   Allergen Reactions    Levaquin PALPITATIONS    Strawberries HIVES    Percocet [Oxycodone-Acetaminophen] ITCHING and NAUSEA AND VOMITING    Codeine Phosphate      States can tolerated morphine    Ephedrine      Pt has a defib.     Naproxen      Abdominal interlance    Qvar [Beclomethasone Dipropionate] OTHER (SEE COMMENTS)     headaches    Tobramycin OTHER (SEE COMMENTS)     Headaches. Contraindicated with heart    Tomatoes       Current Meds:  Current Outpatient Medications   Medication Sig Dispense Refill    oxybutynin ER 5 MG Oral Tablet 24 Hr Take 1 tablet (5 mg total) by mouth daily. 30 tablet 0    montelukast 10 MG Oral Tab Take 1 tablet (10 mg total) by mouth daily. 90 tablet 0    MECLIZINE 25 MG Oral Tab TAKE 1 TABLET BY MOUTH THREE TIMES A DAY AS NEEDED 30 tablet 0    Biotin 5 MG Oral Cap Take 1 tablet by mouth As Directed.      Multiple Vitamins-Minerals (EMERGEN-C VITAMIN C) Oral Powd Pack Take 1 tablet by mouth As Directed.      ADVAIR -21 MCG/ACT Inhalation Aerosol INHALE 1 PUFF BY MOUTH TWICE A DAY 8 each 0    Ferrous Sulfate 325 (65 Fe) MG Oral Tab Take 1 tablet (325 mg total) by mouth.  0    Albuterol Sulfate  (90 Base) MCG/ACT Inhalation Aero Soln Inhale 2 puffs into the lungs every 6 (six) hours as needed for Wheezing.      albuterol sulfate  (2.5 MG/3ML) 0.083% Inhalation Nebu Soln Take 3 mL (2.5 mg total) by nebulization every 4 (four) hours as needed for Wheezing. 60 ampule 0    PHENYTOIN SODIUM EXTENDED 100 MG Oral Cap TAKE 4 CAPSULES BY MOUTH DAILY 120 capsule 5    ibuprofen 200 MG Oral Tab Take 2 tablets (400 mg total) by mouth every 6 (six) hours as needed.      Propranolol HCl  MG Oral Capsule SR 24 Hr Take 2 in the morning and 2 at night   5    EPINEPHrine 0.3 MG/0.3ML Injection Solution Auto-injector INJ 0.3 ML IM 1 TIME FOR 1 DOSE UTD  0    BENADRYL 25 MG OR CAPS 2 CAPSULES QD.  Will take extra 2 if experiencing allergy sx.         Counseling given: Not Answered       REVIEW OF SYSTEMS:   See HPI.    EXAM:     /72 (BP Location: Left arm, Patient Position: Sitting, Cuff Size: large)   Pulse 81   Temp 97 °F (36.1 °C) (Temporal)   Resp 16   Ht 5' 2\" (1.575 m)   Wt 223 lb 3.2 oz (101.2 kg)   SpO2 98%   BMI 40.82 kg/m²   Body mass index is 40.82 kg/m².   Vital signs reviewed. Appears stated age, well groomed, in no acute distress.  Physical Exam:  GENERAL: Patient is alert, awake and oriented, well developed, well nourished.  HEENT: Head: Normocephalic, atraumatic.   HEART: RRR without murmur.  LUNGS: Clear to auscultation bilaterally, no rales/rhonchi/wheezing.  ABDOMEN: good BS's, no masses, HSM or tenderness  : No suprapubic or CVAT bilaterally.   MUSCULOSKELETAL: No obvious joint deformity or swelling.   EXTREMITIES: No edema, no cyanosis, no clubbing, FROM  NEURO: Oriented time three.     LABS:      Lab Results   Component Value Date    WBC 7.5 08/14/2022    RBC 4.18 08/14/2022    HGB 13.5 08/14/2022    HCT 41.8 08/14/2022    .0 08/14/2022    MCH 32.3 08/14/2022    MCHC 32.3 08/14/2022    RDW 12.6 08/14/2022    .0 08/14/2022    MPV 9.5 02/18/2013      Lab Results   Component Value Date     (H) 08/14/2022    BUN 9 08/14/2022    BUNCREA 18.1 03/05/2020    CREATSERUM 0.65 08/14/2022    ANIONGAP 4  08/14/2022     04/07/2017    GFRNAA 104 08/06/2021    GFRAA 120 08/06/2021    CA 8.7 08/14/2022    OSMOCALC 285 08/14/2022    ALKPHO 115 (H) 08/14/2022    AST 18 08/14/2022    ALT 31 08/14/2022    BILT 0.6 08/14/2022    TP 7.0 08/14/2022    ALB 3.2 (L) 08/14/2022    GLOBULIN 3.8 08/14/2022    AGRATIO 1.4 07/18/2012     08/14/2022    K 3.4 (L) 08/14/2022     08/14/2022    CO2 28.0 08/14/2022      Lab Results   Component Value Date    CHOLEST 245 (H) 08/06/2021    TRIG 61 08/06/2021    HDL 79 (H) 08/06/2021     (H) 08/06/2021    VLDL 12 08/06/2021    TCHDLRATIO 3.00 02/25/2019    NONHDLC 166 (H) 08/06/2021      Lab Results   Component Value Date    T4F 1.2 02/18/2013    TSH 1.340 08/06/2021      Lab Results   Component Value Date     08/06/2021    A1C 5.8 (H) 08/06/2021        IMAGING:     No results found.     ASSESSMENT AND PLAN:   1. Overactive bladder  2. Mixed stress and urge urinary incontinence  -Continue Kegel exercises  -Start pelvic floor therapy  -Trial Oxybutynin, SE discussed  - oxybutynin ER 5 MG Oral Tablet 24 Hr; Take 1 tablet (5 mg total) by mouth daily.  Dispense: 30 tablet; Refill: 0  - Pelvic Floor Therapy - [facility]    3. Mild persistent asthma without complication (HCC)  -Refill done  -CPM  - montelukast 10 MG Oral Tab; Take 1 tablet (10 mg total) by mouth daily.  Dispense: 90 tablet; Refill: 0    4. Seizure disorder (HCC)  -See neuro  - NEURO - INTERNAL     The patient indicates understanding of these issues and agrees to the plan.  Return for as scheduled for physical or sooner as needed.    Aneta Simmons, MARCELL  7/15/2024

## 2024-07-15 NOTE — PATIENT INSTRUCTIONS
Continue kegel exercises.    Start pelvic floor therapy.    Start oxybutynin once daily. Monitor for any side effects.

## 2024-07-16 RX ORDER — OXYBUTYNIN CHLORIDE 5 MG/1
5 TABLET, EXTENDED RELEASE ORAL DAILY
Qty: 90 TABLET | Refills: 0 | OUTPATIENT
Start: 2024-07-16

## 2024-07-30 ENCOUNTER — OFFICE VISIT (OUTPATIENT)
Dept: INTERNAL MEDICINE CLINIC | Facility: CLINIC | Age: 49
End: 2024-07-30
Payer: COMMERCIAL

## 2024-07-30 VITALS
HEART RATE: 84 BPM | DIASTOLIC BLOOD PRESSURE: 78 MMHG | RESPIRATION RATE: 16 BRPM | WEIGHT: 222 LBS | BODY MASS INDEX: 40.85 KG/M2 | SYSTOLIC BLOOD PRESSURE: 130 MMHG | TEMPERATURE: 97 F | HEIGHT: 61.81 IN | OXYGEN SATURATION: 96 %

## 2024-07-30 DIAGNOSIS — Z13.220 SCREENING FOR CHOLESTEROL LEVEL: ICD-10-CM

## 2024-07-30 DIAGNOSIS — H61.23 BILATERAL IMPACTED CERUMEN: ICD-10-CM

## 2024-07-30 DIAGNOSIS — Z23 NEED FOR VACCINATION: ICD-10-CM

## 2024-07-30 DIAGNOSIS — F32.A DEPRESSION, UNSPECIFIED DEPRESSION TYPE: ICD-10-CM

## 2024-07-30 DIAGNOSIS — N39.46 MIXED STRESS AND URGE URINARY INCONTINENCE: ICD-10-CM

## 2024-07-30 DIAGNOSIS — Z12.11 SCREENING FOR COLON CANCER: ICD-10-CM

## 2024-07-30 DIAGNOSIS — Z12.31 ENCOUNTER FOR SCREENING MAMMOGRAM FOR BREAST CANCER: ICD-10-CM

## 2024-07-30 DIAGNOSIS — J45.30 MILD PERSISTENT ASTHMA WITHOUT COMPLICATION (HCC): Chronic | ICD-10-CM

## 2024-07-30 DIAGNOSIS — Z13.29 SCREENING FOR THYROID DISORDER: ICD-10-CM

## 2024-07-30 DIAGNOSIS — R42 VERTIGO: ICD-10-CM

## 2024-07-30 DIAGNOSIS — B35.1 ONYCHOMYCOSIS: ICD-10-CM

## 2024-07-30 DIAGNOSIS — N32.81 OVERACTIVE BLADDER: ICD-10-CM

## 2024-07-30 DIAGNOSIS — Z00.00 ENCOUNTER FOR ANNUAL PHYSICAL EXAM: Primary | ICD-10-CM

## 2024-07-30 PROCEDURE — 99396 PREV VISIT EST AGE 40-64: CPT | Performed by: NURSE PRACTITIONER

## 2024-07-30 PROCEDURE — 99214 OFFICE O/P EST MOD 30 MIN: CPT | Performed by: NURSE PRACTITIONER

## 2024-07-30 PROCEDURE — 90471 IMMUNIZATION ADMIN: CPT | Performed by: NURSE PRACTITIONER

## 2024-07-30 PROCEDURE — 90715 TDAP VACCINE 7 YRS/> IM: CPT | Performed by: NURSE PRACTITIONER

## 2024-07-30 RX ORDER — BUPROPION HYDROCHLORIDE 150 MG/1
150 TABLET ORAL DAILY
Qty: 90 TABLET | Refills: 0 | Status: SHIPPED | OUTPATIENT
Start: 2024-07-30

## 2024-07-30 RX ORDER — MECLIZINE HYDROCHLORIDE 25 MG/1
25 TABLET ORAL 3 TIMES DAILY PRN
Qty: 30 TABLET | Refills: 0 | Status: SHIPPED | OUTPATIENT
Start: 2024-07-30

## 2024-07-30 RX ORDER — FLUTICASONE PROPIONATE AND SALMETEROL XINAFOATE 230; 21 UG/1; UG/1
1 AEROSOL, METERED RESPIRATORY (INHALATION) 2 TIMES DAILY
Qty: 8 EACH | Refills: 0 | Status: SHIPPED | OUTPATIENT
Start: 2024-07-30

## 2024-07-30 RX ORDER — MONTELUKAST SODIUM 10 MG/1
10 TABLET ORAL DAILY
Qty: 90 TABLET | Refills: 3 | Status: SHIPPED | OUTPATIENT
Start: 2024-07-30

## 2024-07-30 RX ORDER — ALBUTEROL SULFATE 90 UG/1
2 AEROSOL, METERED RESPIRATORY (INHALATION) EVERY 6 HOURS PRN
Qty: 1 EACH | Refills: 2 | Status: SHIPPED | OUTPATIENT
Start: 2024-07-30

## 2024-07-30 RX ORDER — OXYBUTYNIN CHLORIDE 5 MG/1
5 TABLET, EXTENDED RELEASE ORAL DAILY
Qty: 90 TABLET | Refills: 3 | Status: SHIPPED | OUTPATIENT
Start: 2024-07-30

## 2024-07-30 NOTE — PROGRESS NOTES
CHIEF COMPLAINT   Complete physical, med check     HPI:   Jessica Epps is a 48 year old female who presents for a complete physical exam, med check.     Wt Readings from Last 6 Encounters:   07/30/24 222 lb (100.7 kg)   07/15/24 223 lb 3.2 oz (101.2 kg)   08/18/22 222 lb 6.4 oz (100.9 kg)   08/14/22 227 lb 8.2 oz (103.2 kg)   07/15/22 211 lb (95.7 kg)   07/05/22 211 lb (95.7 kg)     Body mass index is 40.85 kg/m².     Diet and exercise are fair. Vaccines reviewed. Wearing seat belt and no texting and driving. Feels safe at home. Pap UTD. Mammo due now. Needs to reestablish with gyne. Colon cancer screening to be done soon. No smoking. Occasional alcohol. No skin concerns.  Labs to be ordered.      Asthma- stable on inhalers. No sob. No SE.    OAB/incontinence- much better on current med. No SE.    Vertigo- stable. Occasional and mild. Would like a refill on meclizine.     Depression- increased lately. Was on Wellbutrin before. Stopped it because she ran out at some point. Would like to restart it. No SI or HI. No anxiety.      Cholesterol, Total (mg/dL)   Date Value   08/06/2021 245 (H)   02/25/2019 225   05/08/2018 202 (H)     HDL Cholesterol (mg/dL)   Date Value   08/06/2021 79 (H)   02/25/2019 75   05/08/2018 75     LDL Cholesterol (mg/dL)   Date Value   08/06/2021 156 (H)   05/08/2018 113     AST (SGOT) (IU/L)   Date Value   07/18/2012 30   02/07/2011 31   08/15/2009 29     AST (U/L)   Date Value   08/14/2022 18   08/06/2021 30   03/05/2020 19   05/09/2015 22   12/15/2013 18   03/28/2013 41   02/18/2013 24     ALT (SGPT) (IU/L)   Date Value   07/18/2012 35   02/07/2011 37   08/15/2009 32     ALT (U/L)   Date Value   08/14/2022 31   08/06/2021 51   03/05/2020 30   05/09/2015 39   12/15/2013 29   03/28/2013 37   02/18/2013 38        Current Outpatient Medications   Medication Sig Dispense Refill    oxybutynin ER 5 MG Oral Tablet 24 Hr Take 1 tablet (5 mg total) by mouth daily. 30 tablet 0    montelukast  10 MG Oral Tab Take 1 tablet (10 mg total) by mouth daily. 90 tablet 0    MECLIZINE 25 MG Oral Tab TAKE 1 TABLET BY MOUTH THREE TIMES A DAY AS NEEDED 30 tablet 0    Biotin 5 MG Oral Cap Take 1 tablet by mouth As Directed.      Multiple Vitamins-Minerals (EMERGEN-C VITAMIN C) Oral Powd Pack Take 1 tablet by mouth As Directed.      ADVAIR -21 MCG/ACT Inhalation Aerosol INHALE 1 PUFF BY MOUTH TWICE A DAY 8 each 0    Ferrous Sulfate 325 (65 Fe) MG Oral Tab Take 1 tablet (325 mg total) by mouth.  0    Albuterol Sulfate  (90 Base) MCG/ACT Inhalation Aero Soln Inhale 2 puffs into the lungs every 6 (six) hours as needed for Wheezing.      albuterol sulfate (2.5 MG/3ML) 0.083% Inhalation Nebu Soln Take 3 mL (2.5 mg total) by nebulization every 4 (four) hours as needed for Wheezing. 60 ampule 0    PHENYTOIN SODIUM EXTENDED 100 MG Oral Cap TAKE 4 CAPSULES BY MOUTH DAILY 120 capsule 5    ibuprofen 200 MG Oral Tab Take 2 tablets (400 mg total) by mouth every 6 (six) hours as needed.      Propranolol HCl  MG Oral Capsule SR 24 Hr Take 2 in the morning and 2 at night   5    EPINEPHrine 0.3 MG/0.3ML Injection Solution Auto-injector INJ 0.3 ML IM 1 TIME FOR 1 DOSE UTD  0    BENADRYL 25 MG OR CAPS 2 CAPSULES QD.  Will take extra 2 if experiencing allergy sx.        Allergies   Allergen Reactions    Levaquin PALPITATIONS    Strawberries HIVES    Percocet [Oxycodone-Acetaminophen] ITCHING and NAUSEA AND VOMITING    Codeine Phosphate      States can tolerated morphine    Ephedrine      Pt has a defib.     Naproxen      Abdominal interlance    Qvar [Beclomethasone Dipropionate] OTHER (SEE COMMENTS)     headaches    Tobramycin OTHER (SEE COMMENTS)     Headaches. Contraindicated with heart    Tomatoes       Past Medical History:    Abdominal pain    Arrhythmia    Arthritis    Asthma (HCC)    Back pain    Belching    Bloating    Blood in the stool    Cardiac defibrillator in place    CERVICAL DYSPLASIA    lgsil     Constipation    Decorative tattoo    Diarrhea, unspecified    Easy bruising    Endometriosis    Has had for approx. 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    Leaking of urine    Leg swelling    Menses painful    Mouth sores    Night sweats    Osteoarthritis    Pacemaker    Pain in joints    Pain with bowel movements    Prolonged QT interval syndrome    Seizure disorder (HCC)    last Sezure 2017    Shortness of breath    Sleep apnea    Sleep disturbance    Stress    Visual impairment    glasses    Weight loss      Past Surgical History:   Procedure Laterality Date    Cardiac defibrillator placement  2015    dual chamber-[employer] [id number]    Cardiac pacemaker placement  1992    Cardiac pacemaker placement  1997    Colonoscopy N/A 9/7/2018    Procedure: COLONOSCOPY;  Surgeon: Elliott Eastman DO;  Location:  ENDOSCOPY    Cu (chronic urticaria) index      Egd      Oral surgery procedure      wisdom teeth    Other surgical history      bunion    Other surgical history      knee left scope  not acl    Other surgical history  09/15/09    colpo     Pacemaker/defibrillator      Removal of ovarian cyst(s)  2002    Tubal ligation  2002      Family History   Problem Relation Age of Onset    Heart Disorder Father     Other (Other) Father         polio wiht  back issues resulting    Other (menieres) Father     Hypertension Mother         controlled on medicine    Asthma Mother     Allergies Mother          med's  and seansonal    Arthritis Mother     Heart Disease Mother     Stroke Mother         massive stroke 2003 cause of death  - seizure    Other (Other) Mother         hemochromatosis/ cva liver disorder    Heart Attack Paternal Grandfather          1974    Cancer Maternal Grandmother         Lung    Stroke Maternal Grandfather     Other (Other) Maternal Grandfather         Stroke    Asthma Brother     Other (Other) Paternal Grandmother         ephzema    Hypertension Brother     Stroke Maternal Grandfather          approx       Social History:   Social History     Socioeconomic History    Marital status:    Tobacco Use    Smoking status: Former     Current packs/day: 0.00     Average packs/day: 0.8 packs/day for 2.0 years (1.6 ttl pk-yrs)     Types: Cigarettes     Start date: 3/28/2006     Quit date: 3/28/2008     Years since quittin.3    Smokeless tobacco: Former   Vaping Use    Vaping status: Never Used   Substance and Sexual Activity    Alcohol use: Yes     Alcohol/week: 0.0 - 1.0 standard drinks of alcohol     Comment: approx 2-3 drinks every 2-3 weeks    Drug use: No    Sexual activity: Yes     Partners: Male     Birth control/protection: Tubal Ligation   Other Topics Concern    Caffeine Concern Yes     Comment: 1-2 cups of coffee daily    Exercise Yes     Comment: walks        REVIEW OF SYSTEMS:   GENERAL: feels well otherwise  SKIN: no complaint of any unusual skin lesions  EYES: no complaint of blurred vision or double vision  HEENT: no complaint of nasal congestion, sinus pain or ST  LUNGS: no complaint of shortness of breath with exertion  CARDIOVASCULAR: no complaint of chest pain on exertion  GI: no complaint of pain,denies heartburn  : no complaints of vaginal discharge or urinary complaints  MUSCULOSKELETAL: no complaint of back pain  NEURO: no complaint of headaches  PSYCHE: no complaint of anxiety  HEMATOLOGIC: denies hx of anemia    EXAM:   /78 (BP  Location: Left arm, Patient Position: Sitting, Cuff Size: adult)   Pulse 84   Temp 97 °F (36.1 °C) (Temporal)   Resp 16   Ht 5' 1.81\" (1.57 m)   Wt 222 lb (100.7 kg)   SpO2 96%   BMI 40.85 kg/m²   Body mass index is 40.85 kg/m².   GENERAL: well developed, well nourished, in no apparent distress  SKIN: no rashes, no suspicious lesions  HEENT: atraumatic, normocephalic, ears with cerumen impaction- irrigated but still with impaction  EYES:PERRLA, EOMI, conjunctiva are clear  NECK: supple,no adenopathy, no thyroid masses.  BREAST: DEFERRED TO GYNE  LUNGS: clear to auscultation; no rhonchi, rales, or wheezing  CARDIO: RRR without murmur  GI: no tenderness  : DEFERRED TO GYNE   MUSCULOSKELETAL: No obvious joint deformity or swelling.  Normal gait.  EXTREMITIES: has BLE wraps in place, no calve tenderness.   NEURO: Oriented times three,cranial nerves are grossly intact,motor and sensory are grossly intact    LABS:     Lab Results   Component Value Date    WBC 7.5 08/14/2022    RBC 4.18 08/14/2022    HGB 13.5 08/14/2022    HCT 41.8 08/14/2022    .0 08/14/2022    MCH 32.3 08/14/2022    MCHC 32.3 08/14/2022    RDW 12.6 08/14/2022    .0 08/14/2022    MPV 9.5 02/18/2013      Lab Results   Component Value Date     (H) 08/14/2022    BUN 9 08/14/2022    BUNCREA 18.1 03/05/2020    CREATSERUM 0.65 08/14/2022    ANIONGAP 4 08/14/2022     04/07/2017    GFRNAA 104 08/06/2021    GFRAA 120 08/06/2021    CA 8.7 08/14/2022    OSMOCALC 285 08/14/2022    ALKPHO 115 (H) 08/14/2022    AST 18 08/14/2022    ALT 31 08/14/2022    BILT 0.6 08/14/2022    TP 7.0 08/14/2022    ALB 3.2 (L) 08/14/2022    GLOBULIN 3.8 08/14/2022    AGRATIO 1.4 07/18/2012     08/14/2022    K 3.4 (L) 08/14/2022     08/14/2022    CO2 28.0 08/14/2022      Lab Results   Component Value Date    CHOLEST 245 (H) 08/06/2021    TRIG 61 08/06/2021    HDL 79 (H) 08/06/2021     (H) 08/06/2021    VLDL 12 08/06/2021    TCHDLRATIO  3.00 02/25/2019    NONHDLC 166 (H) 08/06/2021      Lab Results   Component Value Date    T4F 1.2 02/18/2013    TSH 1.340 08/06/2021      Lab Results   Component Value Date     08/06/2021    A1C 5.8 (H) 08/06/2021       IMAGING:     No results found.     ASSESSMENT AND PLAN:   1. Encounter for annual physical exam  - Jessica Epps is a 48 year old female who presents for a complete physical exam.  Pap UTD and pelvic deferred to gyne. Encouraged self breast exams. Mammogram ordered. Reviewed diet and exercise.   Pt' s weight is Body mass index is 40.85 kg/m².. Labs reviewed/ordered. Vaccines reviewed. C scope due- referral given. TDAP given. Labs ordered.   - CBC With Differential With Platelet; Future  - Comp Metabolic Panel (14); Future    2. Mild persistent asthma without complication (HCC)  - stable. Continue current management   - montelukast 10 MG Oral Tab; Take 1 tablet (10 mg total) by mouth daily.  Dispense: 90 tablet; Refill: 3  - ADVAIR -21 MCG/ACT Inhalation Aerosol; Inhale 1 puff into the lungs 2 (two) times daily.  Dispense: 8 each; Refill: 0  - albuterol 108 (90 Base) MCG/ACT Inhalation Aero Soln; Inhale 2 puffs into the lungs every 6 (six) hours as needed for Wheezing.  Dispense: 1 each; Refill: 2    3. Depression, unspecified depression type  - increased. Needs to restart med  - refill sent  - give office an update in 2-4 weeks  - buPROPion  MG Oral Tablet 24 Hr; Take 1 tablet (150 mg total) by mouth daily.  Dispense: 90 tablet; Refill: 0    4. Overactive bladder  - stable. Continue current management   - oxybutynin ER 5 MG Oral Tablet 24 Hr; Take 1 tablet (5 mg total) by mouth daily.  Dispense: 90 tablet; Refill: 3    5. Mixed stress and urge urinary incontinence  - stable. Continue current management   - oxybutynin ER 5 MG Oral Tablet 24 Hr; Take 1 tablet (5 mg total) by mouth daily.  Dispense: 90 tablet; Refill: 3    6. Vertigo  - stable. Continue current managment  -  meclizine 25 MG Oral Tab; Take 1 tablet (25 mg total) by mouth 3 (three) times daily as needed.  Dispense: 30 tablet; Refill: 0    7. Onychomycosis  - see foot doc  - PODIATRY - INTERNAL    8. Bilateral impacted cerumen  - irrigated - not successful  - do debrox and return for a ear wash    9. Encounter for screening mammogram for breast cancer  - [facility] LAXMI 2D+3D SCREENING BILAT (CPT=77067/65052); Future    10. Screening for colon cancer  - Gastro Referral - In Network    11. Screening for cholesterol level  - Lipid Panel; Future    12. Screening for thyroid disorder  - TSH W Reflex To Free T4; Future    13. Need for vaccination  - TdaP (Adacel, Boostrix) [10787]         Follow up in 1 year or sooner as needed or for ear wash  The patient indicates understanding of these issues and agrees to the plan.

## 2024-07-30 NOTE — PATIENT INSTRUCTIONS
Get your mammogram done    Make an appointment to see gynecology. Dr. Paz in [place] is another option or Dr. Solorzano with [facility].     Get your labs done. You should be fasting for at least 10 hours. If you take a multivitamin with Biotin or any biotin product it should be held for 3 days prior to getting your labs done.     Continue to see cardiology    Check with your insurance to verify coverage for the Shingrix vaccine for shingles. Also check to see where you can go to get the vaccine. You can also get a price check at the pharmacy instead.      Miconazole 2% cream after filing the nails twice a day.     See podiatry     Continue your current medications    Flu shot recommended in the fall    Restart bupropion and monitor effectiveness. Send an update on your mood in 2-4 weeks or sooner as needed.      Do debrox drops for a week and return for an ear wash    Follow up in 1 year or sooner as needed

## 2024-07-31 ENCOUNTER — TELEPHONE (OUTPATIENT)
Dept: INTERNAL MEDICINE CLINIC | Facility: CLINIC | Age: 49
End: 2024-07-31

## 2024-08-02 ENCOUNTER — OFFICE VISIT (OUTPATIENT)
Dept: INTERNAL MEDICINE CLINIC | Facility: CLINIC | Age: 49
End: 2024-08-02
Payer: COMMERCIAL

## 2024-08-02 VITALS
SYSTOLIC BLOOD PRESSURE: 118 MMHG | WEIGHT: 224.38 LBS | DIASTOLIC BLOOD PRESSURE: 72 MMHG | OXYGEN SATURATION: 97 % | RESPIRATION RATE: 16 BRPM | TEMPERATURE: 97 F | HEART RATE: 78 BPM | BODY MASS INDEX: 41.29 KG/M2 | HEIGHT: 61.81 IN

## 2024-08-02 DIAGNOSIS — R07.89 CHEST WALL PAIN: Primary | ICD-10-CM

## 2024-08-02 DIAGNOSIS — R94.31 PROLONGED Q-T INTERVAL ON ECG: ICD-10-CM

## 2024-08-02 DIAGNOSIS — E87.6 HYPOKALEMIA: ICD-10-CM

## 2024-08-02 PROCEDURE — 99214 OFFICE O/P EST MOD 30 MIN: CPT | Performed by: NURSE PRACTITIONER
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
## 2024-08-02 NOTE — PATIENT INSTRUCTIONS
Go to the ER for any emergent symptoms. If you cannot get there safely call 911.     Get your labs done. You should be fasting for at least 10 hours. If you take a multivitamin with Biotin or any biotin product it should be held for 3 days prior to getting your labs done.     Continue good hydration    See cardiology, make an appointment as soon as possible    You may use heat and ice to the chest wall as needed and topical medicine like lidocaine as needed for pain.    Follow-up 1 year routine care is due or sooner as needed.

## 2024-08-09 ENCOUNTER — TELEPHONE (OUTPATIENT)
Dept: INTERNAL MEDICINE CLINIC | Facility: CLINIC | Age: 49
End: 2024-08-09
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
## 2024-09-13 ENCOUNTER — TELEPHONE (OUTPATIENT)
Dept: INTERNAL MEDICINE CLINIC | Facility: CLINIC | Age: 49
End: 2024-09-13

## 2024-09-13 DIAGNOSIS — R07.89 CHEST WALL PAIN: ICD-10-CM

## 2024-09-13 RX ORDER — MELOXICAM 15 MG/1
15 TABLET ORAL DAILY
Qty: 30 TABLET | Refills: 0 | Status: SHIPPED | OUTPATIENT
Start: 2024-09-13

## 2024-09-13 NOTE — TELEPHONE ENCOUNTER
Patient wants to know should she keep taking the Meloxicam short term or long term. It is working for the pain.

## 2024-09-13 NOTE — TELEPHONE ENCOUNTER
Non-Narcotic Pain Medication Protocol Passed09/13/2024 02:26 PM   Protocol Details In person appointment or virtual visit in the past 6 mos or appointment in next 3 mos

## 2024-09-14 DIAGNOSIS — R07.89 CHEST WALL PAIN: ICD-10-CM

## 2024-09-16 RX ORDER — MELOXICAM 15 MG/1
15 TABLET ORAL DAILY
Qty: 30 TABLET | Refills: 0 | OUTPATIENT
Start: 2024-09-16

## 2024-10-14 DIAGNOSIS — R42 VERTIGO: ICD-10-CM

## 2024-10-16 RX ORDER — MECLIZINE HYDROCHLORIDE 25 MG/1
25 TABLET ORAL 3 TIMES DAILY PRN
Qty: 30 TABLET | Refills: 0 | Status: SHIPPED | OUTPATIENT
Start: 2024-10-16

## 2024-10-16 NOTE — TELEPHONE ENCOUNTER
Gastrointestional Medication Protocol Passed10/14/2024 09:27 AM   Protocol Details In person appointment or virtual visit in the past 12 mos or appointment in next 3 mos      . Vertigo  - stable. Continue current managment  - meclizine 25 MG Oral Tab; Take 1 tablet (25 mg total) by mouth 3 (three) times daily as needed.  Dispense: 30 tablet; Refill: 0  No future appointments.

## 2024-11-04 DIAGNOSIS — R07.89 CHEST WALL PAIN: ICD-10-CM

## 2024-11-04 RX ORDER — MELOXICAM 15 MG/1
15 TABLET ORAL DAILY
Qty: 30 TABLET | Refills: 0 | Status: CANCELLED | OUTPATIENT
Start: 2024-11-04

## 2024-11-04 NOTE — TELEPHONE ENCOUNTER
Patient is in [facility].  She would like to know if she needs to continue   Medication Quantity Refills Start End   MELOXICAM 15 MG Oral Tab           If so, can she get a refill set to [facility]  [address] [place] [address]  Phone [phone]

## 2024-11-04 NOTE — TELEPHONE ENCOUNTER
This was given for her chest wall pain to help with inflammation. Ideally, she shouldn't be on this long term so I don't recommend she continue this unless the pain is really bad. She can try OTC Tylenol and/or ibuprofen prn. Thanks.

## 2024-11-04 NOTE — TELEPHONE ENCOUNTER
See below. Rx pended for out of state pharmacy if applicable. Please advise- thanks!  Overdue for labs.

## 2024-11-04 NOTE — TELEPHONE ENCOUNTER
Patient is in [facility].  She would like to know if she needs to continue   Medication Quantity Refills Start End   MELOXICAM 15 MG Oral Tab                 If so, can she get a refill set to [facility]  [address] [place] [address]  Phone [phone]

## 2024-11-06 NOTE — TELEPHONE ENCOUNTER
Spoke with pt with , she states she hasn't had any pain in 3 weeks, she has continue to take the Meloxicam daily, however since she ran out a few days ago she has not had any recurrence of pain still! Advised if she's not having pain, no need for Meloxicam- this should not be taken long term ideally and if any minor pain can do tylenol/iburprofen as needed and she verbalized understanding. She understand to let us know if this pain starts to return. Christine thanks!

## 2024-11-21 DIAGNOSIS — F32.A DEPRESSION, UNSPECIFIED DEPRESSION TYPE: ICD-10-CM

## 2024-11-21 RX ORDER — BUPROPION HYDROCHLORIDE 150 MG/1
150 TABLET ORAL DAILY
Qty: 90 TABLET | Refills: 0 | Status: SHIPPED | OUTPATIENT
Start: 2024-11-21

## 2024-11-21 NOTE — TELEPHONE ENCOUNTER
Is this medication prescribed by the [facility] Providers? yes    Did the patient contact the pharmacy directly?:  yes    Is patient out of meds or supply very low?:  out    Medication Requested:  bupropion    Dose:  150mg    Is patient requesting a 30 or 90 day supply?:  30    Pharmacy name and phone # or location:  [id number] - [place] [address] AT [facility] [address], [phone], [phone]     Is the patient due for an appointment?:   (if so, please schedule appt)    Additional Notes:      Please advise the patient refills take up to 72 business hours.

## 2024-11-21 NOTE — TELEPHONE ENCOUNTER
See message below. Patient out of rx.    Last OV relevant to medication: 8/10/24   Last refill date: 7/30     #/refills: 90/0   When pt was asked to return for OV:    Follow up in 1 year or sooner as needed or for ear wash   Upcoming appt/reason: No future appointments.    Was pt informed of any over due labs: active labs, some labs done elsewhere does patient need all our active labs still or just the lipid panel?

## (undated) DIAGNOSIS — J45.20 MILD INTERMITTENT ASTHMA WITHOUT COMPLICATION: ICD-10-CM

## (undated) DEVICE — MEDI-VAC SUCTION HANDLE REGULAR CAPACITY: Brand: CARDINAL HEALTH

## (undated) DEVICE — FILTERLINE NASAL ADULT O2/CO2

## (undated) DEVICE — Device: Brand: DEFENDO AIR/WATER/SUCTION AND BIOPSY VALVE

## (undated) DEVICE — ENDOSCOPY PACK - LOWER: Brand: MEDLINE INDUSTRIES, INC.

## (undated) DEVICE — 1200CC GUARDIAN II: Brand: GUARDIAN

## (undated) DEVICE — MEDI-VAC NON-CONDUCTIVE SUCTION TUBING: Brand: CARDINAL HEALTH

## (undated) NOTE — LETTER
Pre-Procedure  Inna Shock    Dr. Osvaldo Caldera, DO        I acknowledge that I have been informed of the risk involved by refusing the Urine Pregenancy on Minnie [address].     I, thereby,

## (undated) NOTE — LETTER
ASTHMA ACTION PLAN for Barry Alfaro     : 1975     Date: 3/1/2021  Provider:  Pineda Kim MD  Phone for doctor or clinic: [facility],  [address] [place] [address], [address] [place] [phone]  [phone]

## (undated) NOTE — LETTER
11/06/19        Sybil [address] [place] [place] [phone]      Dear Issa Jameson,    [facility] records indicate that you have outstanding lab work and or testing that was ordered for you and has not yet been completed:  Orders

## (undated) NOTE — Clinical Note
ASTHMA ACTION PLAN for Mickie Rodríguez     : 1975     Date: 2017  Provider:  Masha Friedman MD  Phone for doctor or clinic: [facility] [address]  [address] [place] [address] [phone]

## (undated) NOTE — LETTER
Patient Name: Kailee Fisher  YOB: 1975          MRN number:  [id number]  Date:  11/5/2018  Referring Physician:  Masha Friedman          Physical Therapy  EVALUATION:    Referring Physician: Dr. Abe Pascal  Diagnosis: acute L ankle pain Date o Observation: amb ind.  Present w/  and     Ankle ROM:   WFL B w/ pain at end range of PF and inversion  Other LE ranges WNL  Accessory motion: unremarkable   Palpation: tender to palpation of medial aspect of achilles tendon and musculotend [de-identified] certification required: Yes  I certify the need for these services furnished under this plan of treatment and while under my care.     X___________________________________________________ Date____________________    Certification From: 18/4/9327

## (undated) NOTE — ED AVS SNAPSHOT
Warrennita Bony   MRN: [id number]    Department:  [facility] Emergency Department   Date of Visit:  12/28/2018           Disclosure     Insurance plans vary and the physician(s) referred by the ER may not be covered by your plan.  Please conta tell this physician (or your personal doctor if your instructions are to return to your personal doctor) about any new or lasting problems. The primary care or specialist physician will see patients referred from the [facility] Emergency Department.  Modesto Berger

## (undated) NOTE — LETTER
10/15/18        Elias Duncan [address] [address]      Dear Rosa Isela Sahni,    [facility] records indicate that you have outstanding lab work and or testing that was ordered for you and has not yet been completed:  Orders

## (undated) NOTE — MR AVS SNAPSHOT
[address] [id number] [phone]               Thank you for choosing us for your health care visit with Etelvina Funez MD.  We are glad to serve you and happy to provide you with Abdominal interlance    Percocet [Oxycodone-Acetaminophen]     Qvar [Beclomethasone Dipropionate]     Strawberries     Tomatoes                 Today's Vital Signs     BP Pulse Temp Height Weight BMI    100/70 mmHg 76 97.5 °F (36.4 °C) (Oral) 62\" 210 lb VITAMIN D-3 OR   None Entered           * Notice: This list has 2 medication(s) that are the same as other medications prescribed for you. Read the directions carefully, and ask your doctor or other care provider to review them with you.             [facility]

## (undated) NOTE — ED AVS SNAPSHOT
Anita Andrade   MRN: [id number]    Department:  [facility] Emergency Department   Date of Visit:  3/7/2019           Disclosure     Insurance plans vary and the physician(s) referred by the ER may not be covered by your plan.  Please contact tell this physician (or your personal doctor if your instructions are to return to your personal doctor) about any new or lasting problems. The primary care or specialist physician will see patients referred from the [facility] Emergency Department.  Justino Gould

## (undated) NOTE — ED AVS SNAPSHOT
Minnie Berger   MRN: [id number]    Department:  [facility] Emergency Department   Date of Visit:  3/30/2018           Disclosure     Insurance plans vary and the physician(s) referred by the ER may not be covered by your plan.  Please contac tell this physician (or your personal doctor if your instructions are to return to your personal doctor) about any new or lasting problems. The primary care or specialist physician will see patients referred from the [facility] Emergency Department.  Milton Faith

## (undated) NOTE — LETTER
[address] [facility] [address]  [phone]               5/10/2018    Armani Schofield  11/20/1975   [address] [place] [phone]        To whom it may concern:    Above named pa

## (undated) NOTE — LETTER
07/01/21      E[address]ose l [address] [facility] [phone]      Dear Dany Oliveros,    [facility] records indicate that you may have outstanding lab work and or testing that was ordered for you and has not yet been completed:  Orders

## (undated) NOTE — ED AVS SNAPSHOT
Nico Poag   MRN: [id number]    Department:  [facility] Emergency Department   Date of Visit:  2/10/2020           Disclosure     Insurance plans vary and the physician(s) referred by the ER may not be covered by your plan.  Please contac tell this physician (or your personal doctor if your instructions are to return to your personal doctor) about any new or lasting problems. The primary care or specialist physician will see patients referred from the [facility] Emergency Department.  Roldan Gilbert

## (undated) NOTE — LETTER
12/10/2018        Karen Duncan [address] [facility] [address]      Dear Wynette Felty,     We are contacting you from Dr. Missy Jack office. Your health is important to us.  We have not received test results for additiona

## (undated) NOTE — ED AVS SNAPSHOT
Fabiola Lebron   MRN: [id number]    Department:  [facility] Emergency Department   Date of Visit:  8/10/2018           Disclosure     Insurance plans vary and the physician(s) referred by the ER may not be covered by your plan.  Please contac tell this physician (or your personal doctor if your instructions are to return to your personal doctor) about any new or lasting problems. The primary care or specialist physician will see patients referred from the [facility] Emergency Department.  Amee Ramey

## (undated) NOTE — LETTER
Date & Time: 1/8/2019, 12:39 PM  Patient: Colby Thacker  Encounter Provider(s):    Rosa Isela Lewis MD       To Whom It May Concern:    Satinder Toledo was seen and treated in our department on 1/8/2019.  She should not return to work until 1/10/20

## (undated) NOTE — LETTER
[facility] Testing Department  Phone: [phone]  OUTSIDE TESTING RESULT REQUEST      TO:   Yamilka Diaz Today's Date: 8/29/18    FAX #: [phone]     IMPORTANT: FOR YOUR IMMEDIATE ATTENTION  Please FAX all test results listed

## (undated) NOTE — LETTER
Date: 11/29/2017    Patient Name: Drake Fitting  11/20/1975          To Whom it may concern: This letter has been written at the patient's request. The above patient was seen at the [facility] for treatment of a medical condition.     Gwendolyn Contreras

## (undated) NOTE — LETTER
Date: 2/25/2020    Patient Name: Fabiola Lebron      To Whom it may concern: This letter has been written at the patient's request. The above patient was seen at the [facility] for treatment of a medical condition.     This patient ha

## (undated) NOTE — LETTER
ASTHMA ACTION PLAN for Jessica Epps     : 1975     Date: 2024  Provider:  MARCELL Nicole  Phone for doctor or clinic: [facility], [facility] [place]  [address]  [facility] [address] [phone]    ACT Score: 23      You can use the colors of a traffic light to help learn about your asthma medicines.      1. Green - Go! % of Personal Best Peak Flow Use controller medicine.   Breathing is good  No cough or wheeze  Can work and play Medicine How much to take When to take it            Advair -21 Inhaler , 1 puff twice daily      2. Yellow - Caution. 50-79% Personal Best Peak  Flow.  Use reliever medicine to keep an asthma attack from getting bad.   Cough  Wheezing  Tight Chest  Wake up at night Medicine How much to take When to take it      Albuterol 108 (90 Base) MCG/ACT inhaler 2 puffs ever 6 hours as needed.        Additional instructions   CONTACT DR'S OFFICE IF SYMPTOMS LAST MORE THAN 24-48 HRS        3. Red - Stop! Danger!  <50% Personal Best Peak  Flow. Take these medications until  Get help from a doctor   Medicine not helping  Breathing is hard and fast  Nose opens wide  Can't walk  Ribs show  Can't talk well Medicine How much to take When to take it        Albuterol 108 (90 Base) MCG/ACT inhaler 1-2 puffs every 10 minutes on the way to Emergency Room!!!!!     Additional Instructions         CALL 911 IF NEEDED!!!              DO NOT DRIVE YOURSELF!!!     [] Asthma Action Plan reviewed with patient (and caregiver if necessary) and a copy of the plan was given to the patient/caregiver.   [x] Asthma Action Plan reviewed with patient (and caregiver if necessary) on the phone and mailed copy to patient or submitted via [employer].     Signatures:  Provider  MARCELL Nicole   Patient Caretaker

## (undated) NOTE — ED AVS SNAPSHOT
Natali Suresh   MRN: [id number]    Department:  [facility] Emergency Department   Date of Visit:  8/22/2018           Disclosure     Insurance plans vary and the physician(s) referred by the ER may not be covered by your plan.  Please contac tell this physician (or your personal doctor if your instructions are to return to your personal doctor) about any new or lasting problems. The primary care or specialist physician will see patients referred from the [facility] Emergency Department.  Sha Wong

## (undated) NOTE — ED AVS SNAPSHOT
Mariselajulisa Swanson   MRN: [id number]    Department:  [facility] Emergency Department   Date of Visit:  6/16/2018           Disclosure     Insurance plans vary and the physician(s) referred by the ER may not be covered by your plan.  Please contac tell this physician (or your personal doctor if your instructions are to return to your personal doctor) about any new or lasting problems. The primary care or specialist physician will see patients referred from the [facility] Emergency Department.  Tip Naqvi

## (undated) NOTE — LETTER
ASTHMA ACTION PLAN for Phyllis Perez     : 1975     Date: 2018  Provider:  Henry Mckeon MD  Phone for doctor or clinic: [facility],  [address] [place] [phone] Signatures:  Provider  Ross Andersen MD   Patient Caretaker

## (undated) NOTE — LETTER
03/21/18        [address] [place] [address]    11/20/1975     Dear Vikki Higgins,    [facility] records indicate that you have outstanding lab work and or testing that was ordered for you and has not yet been comple

## (undated) NOTE — LETTER
Pre-Procedure  Makenzie Crawford, DO        I acknowledge that I have been informed of the risk involved by refusing the Urine Pregnancy on Silvia Pope.     I, thereby,

## (undated) NOTE — ED AVS SNAPSHOT
Kisha Aldana   MRN: [id number]    Department:  [facility] Emergency Department   Date of Visit:  7/24/2019           Disclosure     Insurance plans vary and the physician(s) referred by the ER may not be covered by your plan.  Please contac tell this physician (or your personal doctor if your instructions are to return to your personal doctor) about any new or lasting problems. The primary care or specialist physician will see patients referred from the [facility] Emergency Department.  Lety Fournier

## (undated) NOTE — ED AVS SNAPSHOT
Colby Thacker   MRN: [id number]    Department:  [facility] Emergency Department   Date of Visit:  2/20/2020           Disclosure     Insurance plans vary and the physician(s) referred by the ER may not be covered by your plan.  Please contac tell this physician (or your personal doctor if your instructions are to return to your personal doctor) about any new or lasting problems. The primary care or specialist physician will see patients referred from the [facility] Emergency Department.  Jolie Porras

## (undated) NOTE — LETTER
Date & Time: 2/10/2020, 11:16 PM  Patient: Candy Hardy  Encounter Provider(s):    Yamilet Arredondo MD       To Whom It May Concern:    Evert Andrés was seen and treated in our department on 2/10/2020.  She should not return to work until [employer]

## (undated) NOTE — LETTER
[facility]  Jossolivia Lojajarrett 59 [address] [facility], [address] [place] [address]    Consent for Operation    Date: __________________    Time: _______________    1.  I authorize the performance upon Fabiola Lebron the following operation:    Procedure(s):  COLONOSCOPY videotape. [facility] will not be responsible for storage or maintenance of this tape. 6. For the purpose of advancing medical education, I consent to the admittance of observers to the Operating Room.     7. I authorize the use of any specimen, organs Signature of Patient:   ___________________________    When the patient is a minor or mentally incompetent to give consent:  Signature of person authorized to consent for patient: ___________________________   Relationship to patient: _____________________ drugs/illegal medications). Failure to inform my anesthesiologist about these medicines may increase my risk of anesthetic complications. · If I am allergic to anything or have had a reaction to anesthesia before.     3. I understand how the anesthesia med I have discussed the procedure and information above with the patient (or patient’s representative) and answered their questions. The patient or their representative has agreed to have anesthesia services.     _______________________________________________

## (undated) NOTE — ED AVS SNAPSHOT
Colby Thacker   MRN: [id number]    Department:  [facility] Emergency Department   Date of Visit:  3/5/2020           Disclosure     Insurance plans vary and the physician(s) referred by the ER may not be covered by your plan.  Please contact tell this physician (or your personal doctor if your instructions are to return to your personal doctor) about any new or lasting problems. The primary care or specialist physician will see patients referred from the [facility] Emergency Department.  Julio Marie

## (undated) NOTE — LETTER
[facility] [employer] for [facility]     : 1975     Date: 3/25/2019  Provider:  Joan Garcia MD  Phone for doctor or clinic: [facility],  [address] [place] [address], [address] [place] [address]  Bipin Donan [phone]  [phone]

## (undated) NOTE — LETTER
03/31/21    Millicent Duncan [address] [facility] [address]    Dear Myrna Lemus,    This is a friendly reminder to let you know you have outstanding lab work as listed below.   To schedule an appointment to complete your labs, rossi

## (undated) NOTE — LETTER
12/30/19        [address] [facility] [address]      Dear Alivia Valenzuela,    [facility] records indicate that you have outstanding lab work and or testing that was ordered for you and has not yet been completed:        V

## (undated) NOTE — MR AVS SNAPSHOT
After Visit Summary   10/11/2021    Anita Andrade   MRN: [id number]           Visit Information     Date & Time  10/11/2021  1:30 PM Provider  Maximus Christianson MD Department  [facility], [facility], [facility] [place] Dept.  Phone  [phone] MG/3ML) 0.083% Inhalation Nebu Soln Take 3 mL (2.5 mg total) by nebulization every 4 (four) hours as needed for Wheezing. Metoclopramide HCl 5 MG Oral Tab Take 1 tablet (5 mg total) by mouth 3 (three) times daily before meals.     magnesium oxide 400 MG Future Appointments        Provider Department    10/15/2021 1:00 PM TWIN Isbell    10/23/2021 9:00  Jane Magallon    1/14/2022 9:40 AM [address]

## (undated) NOTE — LETTER
[facility] Testing Department  Phone: [phone]  OUTSIDE TESTING RESULT REQUEST      TO:   [facility] medical records Today's Date: 8/31/18  Attention: London Hidalgo #: [phone]     IMPORTANT: FOR YOUR IMMEDIATE ATTENTION

## (undated) NOTE — MR AVS SNAPSHOT
[address]  [address] [address]  [phone]               Thank you for choosing us for your health care visit with MARLENE Martinez.   We are glad to serve you and happy to provide you wi Take 1 tablet (200 mg total) by mouth daily. Take with 50mg tablet for 250 mg dose daily   Commonly known as:  TOPROL-XL           * Metoprolol Succinate ER 50 MG Tb24   Take 1 tablet (50 mg total) by mouth daily.  Take with 200mg tablet daily for 250mg dos HOW TO GET STARTED: HOW TO STAY MOTIVATED:   Start activities slowly and build up over time Do what you like   Get your heart pumping – brisk walking, biking, swimming Even 10 minute increments are effective and add up over the week   2 ½ hours per week –

## (undated) NOTE — ED AVS SNAPSHOT
Natalinicola Acosta   MRN: [id number]    Department:  [facility] Emergency Department   Date of Visit:  4/5/2018           Disclosure     Insurance plans vary and the physician(s) referred by the ER may not be covered by your plan.  Please contact tell this physician (or your personal doctor if your instructions are to return to your personal doctor) about any new or lasting problems. The primary care or specialist physician will see patients referred from the [facility] Emergency Department.  Wing Tyler